# Patient Record
Sex: MALE | Race: OTHER | URBAN - METROPOLITAN AREA
[De-identification: names, ages, dates, MRNs, and addresses within clinical notes are randomized per-mention and may not be internally consistent; named-entity substitution may affect disease eponyms.]

---

## 2023-05-22 ENCOUNTER — HOSPITAL ENCOUNTER (INPATIENT)
Facility: HOSPITAL | Age: 38
LOS: 2 days | Discharge: ANOTHER HOSPITAL | DRG: 101 | End: 2023-05-24
Attending: EMERGENCY MEDICINE | Admitting: FAMILY MEDICINE
Payer: MEDICAID

## 2023-05-22 DIAGNOSIS — R41.82 ALTERED MENTAL STATUS: ICD-10-CM

## 2023-05-22 DIAGNOSIS — R79.89 TROPONIN LEVEL ELEVATED: ICD-10-CM

## 2023-05-22 DIAGNOSIS — A41.9 SEPSIS, DUE TO UNSPECIFIED ORGANISM, UNSPECIFIED WHETHER ACUTE ORGAN DYSFUNCTION PRESENT: Primary | ICD-10-CM

## 2023-05-22 DIAGNOSIS — M62.82 NON-TRAUMATIC RHABDOMYOLYSIS: ICD-10-CM

## 2023-05-22 DIAGNOSIS — N17.9 AKI (ACUTE KIDNEY INJURY): ICD-10-CM

## 2023-05-22 DIAGNOSIS — R56.9 SEIZURE: ICD-10-CM

## 2023-05-22 DIAGNOSIS — Z97.8 ENDOTRACHEALLY INTUBATED: ICD-10-CM

## 2023-05-22 DIAGNOSIS — G40.901 STATUS EPILEPTICUS: ICD-10-CM

## 2023-05-22 PROBLEM — I10 HTN (HYPERTENSION): Status: ACTIVE | Noted: 2023-05-22

## 2023-05-22 LAB
ALBUMIN SERPL BCP-MCNC: 4.9 G/DL (ref 3.5–5.2)
ALP SERPL-CCNC: 56 U/L (ref 55–135)
ALT SERPL W/O P-5'-P-CCNC: 23 U/L (ref 10–44)
AMPHET+METHAMPHET UR QL: NEGATIVE
ANION GAP SERPL CALC-SCNC: 12 MMOL/L (ref 8–16)
ANION GAP SERPL CALC-SCNC: 21 MMOL/L (ref 8–16)
AST SERPL-CCNC: 31 U/L (ref 10–40)
BACTERIA #/AREA URNS HPF: ABNORMAL /HPF
BARBITURATES UR QL SCN>200 NG/ML: NEGATIVE
BASOPHILS # BLD AUTO: 0.07 K/UL (ref 0–0.2)
BASOPHILS NFR BLD: 0.4 % (ref 0–1.9)
BENZODIAZ UR QL SCN>200 NG/ML: ABNORMAL
BILIRUB SERPL-MCNC: 1.1 MG/DL (ref 0.1–1)
BILIRUB UR QL STRIP: NEGATIVE
BUN SERPL-MCNC: 19 MG/DL (ref 6–20)
BUN SERPL-MCNC: 24 MG/DL (ref 6–20)
BZE UR QL SCN: NEGATIVE
CALCIUM SERPL-MCNC: 10.5 MG/DL (ref 8.7–10.5)
CALCIUM SERPL-MCNC: 8.9 MG/DL (ref 8.7–10.5)
CANNABINOIDS UR QL SCN: ABNORMAL
CHLORIDE SERPL-SCNC: 110 MMOL/L (ref 95–110)
CHLORIDE SERPL-SCNC: 111 MMOL/L (ref 95–110)
CK SERPL-CCNC: 6946 U/L (ref 20–200)
CK SERPL-CCNC: 749 U/L (ref 20–200)
CK SERPL-CCNC: ABNORMAL U/L (ref 20–200)
CLARITY CSF: CLEAR
CLARITY UR: ABNORMAL
CO2 SERPL-SCNC: 13 MMOL/L (ref 23–29)
CO2 SERPL-SCNC: 22 MMOL/L (ref 23–29)
COLOR CSF: COLORLESS
COLOR UR: YELLOW
CREAT SERPL-MCNC: 1.4 MG/DL (ref 0.5–1.4)
CREAT SERPL-MCNC: 2.3 MG/DL (ref 0.5–1.4)
CREAT UR-MCNC: 114.2 MG/DL (ref 23–375)
CSF TUBE NUMBER: 1
CSF TUBE NUMBER: 1
CTP QC/QA: YES
CTP QC/QA: YES
DIFFERENTIAL METHOD: ABNORMAL
EOSINOPHIL # BLD AUTO: 0.1 K/UL (ref 0–0.5)
EOSINOPHIL NFR BLD: 0.3 % (ref 0–8)
ERYTHROCYTE [DISTWIDTH] IN BLOOD BY AUTOMATED COUNT: 12.8 % (ref 11.5–14.5)
EST. GFR  (NO RACE VARIABLE): 36 ML/MIN/1.73 M^2
EST. GFR  (NO RACE VARIABLE): >60 ML/MIN/1.73 M^2
ETHANOL SERPL-MCNC: <10 MG/DL
GLUCOSE CSF-MCNC: 78 MG/DL (ref 40–70)
GLUCOSE SERPL-MCNC: 107 MG/DL (ref 70–110)
GLUCOSE SERPL-MCNC: 107 MG/DL (ref 70–110)
GLUCOSE UR QL STRIP: ABNORMAL
HCO3 UR-SCNC: 21.3 MMOL/L (ref 24–28)
HCT VFR BLD AUTO: 42.1 % (ref 40–54)
HGB BLD-MCNC: 13.6 G/DL (ref 14–18)
HGB UR QL STRIP: ABNORMAL
HYALINE CASTS #/AREA URNS LPF: 8 /LPF
IMM GRANULOCYTES # BLD AUTO: 0.16 K/UL (ref 0–0.04)
IMM GRANULOCYTES NFR BLD AUTO: 0.9 % (ref 0–0.5)
KETONES UR QL STRIP: NEGATIVE
LACTATE SERPL-SCNC: 2.4 MMOL/L (ref 0.5–2.2)
LACTATE SERPL-SCNC: 9.7 MMOL/L (ref 0.5–2.2)
LEUKOCYTE ESTERASE UR QL STRIP: NEGATIVE
LYMPHOCYTES # BLD AUTO: 1.8 K/UL (ref 1–4.8)
LYMPHOCYTES NFR BLD: 9.8 % (ref 18–48)
LYMPHOCYTES NFR CSF MANUAL: 82 % (ref 40–80)
MAGNESIUM SERPL-MCNC: 2.9 MG/DL (ref 1.6–2.6)
MCH RBC QN AUTO: 29.1 PG (ref 27–31)
MCHC RBC AUTO-ENTMCNC: 32.3 G/DL (ref 32–36)
MCV RBC AUTO: 90 FL (ref 82–98)
METHADONE UR QL SCN>300 NG/ML: NEGATIVE
MICROSCOPIC COMMENT: ABNORMAL
MONOCYTES # BLD AUTO: 0.9 K/UL (ref 0.3–1)
MONOCYTES NFR BLD: 4.9 % (ref 4–15)
MONOS+MACROS NFR CSF MANUAL: 3 % (ref 15–45)
NEUTROPHILS # BLD AUTO: 15.1 K/UL (ref 1.8–7.7)
NEUTROPHILS NFR BLD: 83.7 % (ref 38–73)
NEUTROPHILS NFR CSF MANUAL: 15 % (ref 0–6)
NITRITE UR QL STRIP: NEGATIVE
NRBC BLD-RTO: 0 /100 WBC
OPIATES UR QL SCN: NEGATIVE
PCO2 BLDA: 37.7 MMHG (ref 35–45)
PCP UR QL SCN>25 NG/ML: NEGATIVE
PH SMN: 7.36 [PH] (ref 7.35–7.45)
PH UR STRIP: 7 [PH] (ref 5–8)
PHOSPHATE SERPL-MCNC: 3.8 MG/DL (ref 2.7–4.5)
PLATELET # BLD AUTO: 294 K/UL (ref 150–450)
PMV BLD AUTO: 10.2 FL (ref 9.2–12.9)
PO2 BLDA: 92 MMHG (ref 80–100)
POC BE: -4 MMOL/L
POC MOLECULAR INFLUENZA A AGN: NEGATIVE
POC MOLECULAR INFLUENZA B AGN: NEGATIVE
POC SATURATED O2: 97 % (ref 95–100)
POC TCO2: 22 MMOL/L (ref 23–27)
POTASSIUM SERPL-SCNC: 4 MMOL/L (ref 3.5–5.1)
POTASSIUM SERPL-SCNC: 4.2 MMOL/L (ref 3.5–5.1)
PROCALCITONIN SERPL IA-MCNC: 0.05 NG/ML
PROT CSF-MCNC: 58 MG/DL (ref 15–40)
PROT SERPL-MCNC: 7.7 G/DL (ref 6–8.4)
PROT UR QL STRIP: ABNORMAL
RBC # BLD AUTO: 4.67 M/UL (ref 4.6–6.2)
RBC # CSF: 11 /CU MM
RBC #/AREA URNS HPF: >100 /HPF (ref 0–4)
SAMPLE: ABNORMAL
SARS-COV-2 RDRP RESP QL NAA+PROBE: NEGATIVE
SODIUM SERPL-SCNC: 144 MMOL/L (ref 136–145)
SODIUM SERPL-SCNC: 145 MMOL/L (ref 136–145)
SP GR UR STRIP: 1.01 (ref 1–1.03)
SPECIMEN VOL CSF: 1.1 ML
SQUAMOUS #/AREA URNS HPF: 1 /HPF
TOXICOLOGY INFORMATION: ABNORMAL
TROPONIN I SERPL DL<=0.01 NG/ML-MCNC: 0.14 NG/ML (ref 0–0.03)
TROPONIN I SERPL DL<=0.01 NG/ML-MCNC: 0.5 NG/ML (ref 0–0.03)
TROPONIN I SERPL DL<=0.01 NG/ML-MCNC: 0.58 NG/ML (ref 0–0.03)
TSH SERPL DL<=0.005 MIU/L-ACNC: 1.38 UIU/ML (ref 0.4–4)
URN SPEC COLLECT METH UR: ABNORMAL
UROBILINOGEN UR STRIP-ACNC: NEGATIVE EU/DL
WBC # BLD AUTO: 18.05 K/UL (ref 3.9–12.7)
WBC # CSF: 2 /CU MM (ref 0–5)
WBC #/AREA URNS HPF: 28 /HPF (ref 0–5)

## 2023-05-22 PROCEDURE — 80048 BASIC METABOLIC PNL TOTAL CA: CPT | Mod: XB

## 2023-05-22 PROCEDURE — 36600 WITHDRAWAL OF ARTERIAL BLOOD: CPT

## 2023-05-22 PROCEDURE — 25000003 PHARM REV CODE 250

## 2023-05-22 PROCEDURE — 83605 ASSAY OF LACTIC ACID: CPT | Mod: 91 | Performed by: EMERGENCY MEDICINE

## 2023-05-22 PROCEDURE — 87502 INFLUENZA DNA AMP PROBE: CPT

## 2023-05-22 PROCEDURE — 84157 ASSAY OF PROTEIN OTHER: CPT | Performed by: EMERGENCY MEDICINE

## 2023-05-22 PROCEDURE — 82550 ASSAY OF CK (CPK): CPT | Performed by: STUDENT IN AN ORGANIZED HEALTH CARE EDUCATION/TRAINING PROGRAM

## 2023-05-22 PROCEDURE — 84145 PROCALCITONIN (PCT): CPT | Performed by: EMERGENCY MEDICINE

## 2023-05-22 PROCEDURE — C9254 INJECTION, LACOSAMIDE: HCPCS | Performed by: STUDENT IN AN ORGANIZED HEALTH CARE EDUCATION/TRAINING PROGRAM

## 2023-05-22 PROCEDURE — 89051 BODY FLUID CELL COUNT: CPT | Performed by: EMERGENCY MEDICINE

## 2023-05-22 PROCEDURE — 62272 THER SPI PNXR DRG CSF: CPT

## 2023-05-22 PROCEDURE — 63600175 PHARM REV CODE 636 W HCPCS

## 2023-05-22 PROCEDURE — 84484 ASSAY OF TROPONIN QUANT: CPT | Mod: 91

## 2023-05-22 PROCEDURE — 25000003 PHARM REV CODE 250: Performed by: EMERGENCY MEDICINE

## 2023-05-22 PROCEDURE — 63600175 PHARM REV CODE 636 W HCPCS: Performed by: STUDENT IN AN ORGANIZED HEALTH CARE EDUCATION/TRAINING PROGRAM

## 2023-05-22 PROCEDURE — 82803 BLOOD GASES ANY COMBINATION: CPT

## 2023-05-22 PROCEDURE — 99291 CRITICAL CARE FIRST HOUR: CPT

## 2023-05-22 PROCEDURE — 99900035 HC TECH TIME PER 15 MIN (STAT)

## 2023-05-22 PROCEDURE — 84484 ASSAY OF TROPONIN QUANT: CPT | Mod: 91 | Performed by: EMERGENCY MEDICINE

## 2023-05-22 PROCEDURE — C9254 INJECTION, LACOSAMIDE: HCPCS | Performed by: SURGERY

## 2023-05-22 PROCEDURE — 96361 HYDRATE IV INFUSION ADD-ON: CPT

## 2023-05-22 PROCEDURE — 80235 DRUG ASSAY LACOSAMIDE: CPT | Performed by: STUDENT IN AN ORGANIZED HEALTH CARE EDUCATION/TRAINING PROGRAM

## 2023-05-22 PROCEDURE — 93010 EKG 12-LEAD: ICD-10-PCS | Mod: 76,,, | Performed by: INTERNAL MEDICINE

## 2023-05-22 PROCEDURE — 84484 ASSAY OF TROPONIN QUANT: CPT | Performed by: STUDENT IN AN ORGANIZED HEALTH CARE EDUCATION/TRAINING PROGRAM

## 2023-05-22 PROCEDURE — 95720 PR EEG, W/VIDEO, CONT RECORD, I&R, >12<26 HRS: ICD-10-PCS | Mod: ,,, | Performed by: STUDENT IN AN ORGANIZED HEALTH CARE EDUCATION/TRAINING PROGRAM

## 2023-05-22 PROCEDURE — 63600175 PHARM REV CODE 636 W HCPCS: Performed by: SURGERY

## 2023-05-22 PROCEDURE — 82550 ASSAY OF CK (CPK): CPT | Mod: 91

## 2023-05-22 PROCEDURE — 80307 DRUG TEST PRSMV CHEM ANLYZR: CPT | Performed by: EMERGENCY MEDICINE

## 2023-05-22 PROCEDURE — P9612 CATHETERIZE FOR URINE SPEC: HCPCS

## 2023-05-22 PROCEDURE — 93005 ELECTROCARDIOGRAM TRACING: CPT

## 2023-05-22 PROCEDURE — 83735 ASSAY OF MAGNESIUM: CPT | Performed by: EMERGENCY MEDICINE

## 2023-05-22 PROCEDURE — 25000003 PHARM REV CODE 250: Performed by: FAMILY MEDICINE

## 2023-05-22 PROCEDURE — 84443 ASSAY THYROID STIM HORMONE: CPT | Performed by: EMERGENCY MEDICINE

## 2023-05-22 PROCEDURE — 95720 EEG PHY/QHP EA INCR W/VEEG: CPT | Mod: ,,, | Performed by: STUDENT IN AN ORGANIZED HEALTH CARE EDUCATION/TRAINING PROGRAM

## 2023-05-22 PROCEDURE — 85025 COMPLETE CBC W/AUTO DIFF WBC: CPT | Performed by: EMERGENCY MEDICINE

## 2023-05-22 PROCEDURE — 82550 ASSAY OF CK (CPK): CPT | Mod: 91 | Performed by: EMERGENCY MEDICINE

## 2023-05-22 PROCEDURE — 99292 CRITICAL CARE ADDL 30 MIN: CPT

## 2023-05-22 PROCEDURE — 87040 BLOOD CULTURE FOR BACTERIA: CPT | Mod: 59 | Performed by: EMERGENCY MEDICINE

## 2023-05-22 PROCEDURE — 96360 HYDRATION IV INFUSION INIT: CPT | Mod: 59

## 2023-05-22 PROCEDURE — 81000 URINALYSIS NONAUTO W/SCOPE: CPT | Mod: 59 | Performed by: EMERGENCY MEDICINE

## 2023-05-22 PROCEDURE — 25000003 PHARM REV CODE 250: Performed by: SURGERY

## 2023-05-22 PROCEDURE — 20000000 HC ICU ROOM

## 2023-05-22 PROCEDURE — 80053 COMPREHEN METABOLIC PANEL: CPT | Performed by: EMERGENCY MEDICINE

## 2023-05-22 PROCEDURE — C9254 INJECTION, LACOSAMIDE: HCPCS

## 2023-05-22 PROCEDURE — 25000003 PHARM REV CODE 250: Performed by: STUDENT IN AN ORGANIZED HEALTH CARE EDUCATION/TRAINING PROGRAM

## 2023-05-22 PROCEDURE — 82077 ASSAY SPEC XCP UR&BREATH IA: CPT | Performed by: EMERGENCY MEDICINE

## 2023-05-22 PROCEDURE — 87205 SMEAR GRAM STAIN: CPT | Performed by: EMERGENCY MEDICINE

## 2023-05-22 PROCEDURE — 93010 ELECTROCARDIOGRAM REPORT: CPT | Mod: 76,,, | Performed by: INTERNAL MEDICINE

## 2023-05-22 PROCEDURE — 96365 THER/PROPH/DIAG IV INF INIT: CPT

## 2023-05-22 PROCEDURE — 93010 ELECTROCARDIOGRAM REPORT: CPT | Mod: ,,, | Performed by: INTERNAL MEDICINE

## 2023-05-22 PROCEDURE — 63600175 PHARM REV CODE 636 W HCPCS: Performed by: EMERGENCY MEDICINE

## 2023-05-22 PROCEDURE — 96375 TX/PRO/DX INJ NEW DRUG ADDON: CPT

## 2023-05-22 PROCEDURE — 87070 CULTURE OTHR SPECIMN AEROBIC: CPT | Performed by: EMERGENCY MEDICINE

## 2023-05-22 PROCEDURE — 36415 COLL VENOUS BLD VENIPUNCTURE: CPT

## 2023-05-22 PROCEDURE — 82945 GLUCOSE OTHER FLUID: CPT | Performed by: EMERGENCY MEDICINE

## 2023-05-22 PROCEDURE — 84100 ASSAY OF PHOSPHORUS: CPT | Performed by: EMERGENCY MEDICINE

## 2023-05-22 RX ORDER — LISINOPRIL 5 MG/1
5 TABLET ORAL DAILY
Status: ON HOLD | COMMUNITY
End: 2023-05-30 | Stop reason: HOSPADM

## 2023-05-22 RX ORDER — DEXTROSE 40 %
15 GEL (GRAM) ORAL
Status: DISCONTINUED | OUTPATIENT
Start: 2023-05-22 | End: 2023-05-24 | Stop reason: HOSPADM

## 2023-05-22 RX ORDER — LIDOCAINE HYDROCHLORIDE 10 MG/ML
INJECTION, SOLUTION EPIDURAL; INFILTRATION; INTRACAUDAL; PERINEURAL
Status: COMPLETED
Start: 2023-05-22 | End: 2023-05-22

## 2023-05-22 RX ORDER — LACOSAMIDE 50 MG/1
100 TABLET ORAL 2 TIMES DAILY
COMMUNITY

## 2023-05-22 RX ORDER — SODIUM CHLORIDE 9 MG/ML
INJECTION, SOLUTION INTRAVENOUS CONTINUOUS
Status: DISCONTINUED | OUTPATIENT
Start: 2023-05-22 | End: 2023-05-22

## 2023-05-22 RX ORDER — MUPIROCIN 20 MG/G
OINTMENT TOPICAL 2 TIMES DAILY
Status: DISCONTINUED | OUTPATIENT
Start: 2023-05-22 | End: 2023-05-24 | Stop reason: HOSPADM

## 2023-05-22 RX ORDER — ACETAMINOPHEN 325 MG/1
650 TABLET ORAL EVERY 4 HOURS PRN
Status: DISCONTINUED | OUTPATIENT
Start: 2023-05-22 | End: 2023-05-22

## 2023-05-22 RX ORDER — ONDANSETRON 2 MG/ML
8 INJECTION INTRAMUSCULAR; INTRAVENOUS EVERY 6 HOURS PRN
Status: DISCONTINUED | OUTPATIENT
Start: 2023-05-22 | End: 2023-05-24 | Stop reason: HOSPADM

## 2023-05-22 RX ORDER — ONDANSETRON 2 MG/ML
8 INJECTION INTRAMUSCULAR; INTRAVENOUS EVERY 8 HOURS PRN
Status: DISCONTINUED | OUTPATIENT
Start: 2023-05-22 | End: 2023-05-22

## 2023-05-22 RX ORDER — NALOXONE HCL 0.4 MG/ML
0.4 VIAL (ML) INJECTION
Status: DISCONTINUED | OUTPATIENT
Start: 2023-05-22 | End: 2023-05-22

## 2023-05-22 RX ORDER — SODIUM CHLORIDE, SODIUM LACTATE, POTASSIUM CHLORIDE, CALCIUM CHLORIDE 600; 310; 30; 20 MG/100ML; MG/100ML; MG/100ML; MG/100ML
INJECTION, SOLUTION INTRAVENOUS CONTINUOUS
Status: ACTIVE | OUTPATIENT
Start: 2023-05-22 | End: 2023-05-23

## 2023-05-22 RX ORDER — AMOXICILLIN 250 MG
1 CAPSULE ORAL 2 TIMES DAILY PRN
Status: DISCONTINUED | OUTPATIENT
Start: 2023-05-22 | End: 2023-05-24

## 2023-05-22 RX ORDER — SODIUM CHLORIDE 0.9 % (FLUSH) 0.9 %
5 SYRINGE (ML) INJECTION
Status: DISCONTINUED | OUTPATIENT
Start: 2023-05-22 | End: 2023-05-24 | Stop reason: HOSPADM

## 2023-05-22 RX ORDER — LORAZEPAM 2 MG/ML
2 INJECTION INTRAMUSCULAR
Status: ACTIVE | OUTPATIENT
Start: 2023-05-22 | End: 2023-05-22

## 2023-05-22 RX ORDER — NALOXONE HCL 0.4 MG/ML
0.02 VIAL (ML) INJECTION
Status: DISCONTINUED | OUTPATIENT
Start: 2023-05-22 | End: 2023-05-24 | Stop reason: HOSPADM

## 2023-05-22 RX ORDER — ACETAMINOPHEN 325 MG/1
650 TABLET ORAL EVERY 6 HOURS PRN
Status: DISCONTINUED | OUTPATIENT
Start: 2023-05-22 | End: 2023-05-24

## 2023-05-22 RX ORDER — SODIUM CHLORIDE, SODIUM LACTATE, POTASSIUM CHLORIDE, CALCIUM CHLORIDE 600; 310; 30; 20 MG/100ML; MG/100ML; MG/100ML; MG/100ML
INJECTION, SOLUTION INTRAVENOUS CONTINUOUS
Status: ACTIVE | OUTPATIENT
Start: 2023-05-22 | End: 2023-05-22

## 2023-05-22 RX ORDER — DEXTROSE 40 %
30 GEL (GRAM) ORAL
Status: DISCONTINUED | OUTPATIENT
Start: 2023-05-22 | End: 2023-05-24 | Stop reason: HOSPADM

## 2023-05-22 RX ORDER — GLUCAGON 1 MG
1 KIT INJECTION
Status: DISCONTINUED | OUTPATIENT
Start: 2023-05-22 | End: 2023-05-24 | Stop reason: HOSPADM

## 2023-05-22 RX ORDER — ACETAMINOPHEN 650 MG/1
650 SUPPOSITORY RECTAL
Status: COMPLETED | OUTPATIENT
Start: 2023-05-22 | End: 2023-05-22

## 2023-05-22 RX ORDER — SODIUM CHLORIDE 0.9 % (FLUSH) 0.9 %
3 SYRINGE (ML) INJECTION
Status: DISCONTINUED | OUTPATIENT
Start: 2023-05-22 | End: 2023-05-24 | Stop reason: HOSPADM

## 2023-05-22 RX ADMIN — SODIUM CHLORIDE, POTASSIUM CHLORIDE, SODIUM LACTATE AND CALCIUM CHLORIDE: 600; 310; 30; 20 INJECTION, SOLUTION INTRAVENOUS at 10:05

## 2023-05-22 RX ADMIN — ONDANSETRON HYDROCHLORIDE 8 MG: 2 SOLUTION INTRAMUSCULAR; INTRAVENOUS at 11:05

## 2023-05-22 RX ADMIN — MUPIROCIN: 20 OINTMENT TOPICAL at 10:05

## 2023-05-22 RX ADMIN — SODIUM CHLORIDE, POTASSIUM CHLORIDE, SODIUM LACTATE AND CALCIUM CHLORIDE 1448 ML: 600; 310; 30; 20 INJECTION, SOLUTION INTRAVENOUS at 09:05

## 2023-05-22 RX ADMIN — ACETAMINOPHEN 650 MG: 650 SUPPOSITORY RECTAL at 08:05

## 2023-05-22 RX ADMIN — LACOSAMIDE 200 MG: 10 INJECTION, SOLUTION INTRAVENOUS at 06:05

## 2023-05-22 RX ADMIN — LACOSAMIDE 50 MG: 10 INJECTION, SOLUTION INTRAVENOUS at 01:05

## 2023-05-22 RX ADMIN — SODIUM CHLORIDE, POTASSIUM CHLORIDE, SODIUM LACTATE AND CALCIUM CHLORIDE: 600; 310; 30; 20 INJECTION, SOLUTION INTRAVENOUS at 06:05

## 2023-05-22 RX ADMIN — LIDOCAINE HYDROCHLORIDE 50 MG: 10 INJECTION, SOLUTION EPIDURAL; INFILTRATION; INTRACAUDAL at 09:05

## 2023-05-22 RX ADMIN — LACOSAMIDE 200 MG: 10 INJECTION, SOLUTION INTRAVENOUS at 04:05

## 2023-05-22 RX ADMIN — SODIUM CHLORIDE: 0.9 INJECTION, SOLUTION INTRAVENOUS at 03:05

## 2023-05-22 RX ADMIN — VANCOMYCIN HYDROCHLORIDE 2000 MG: 5 INJECTION, POWDER, LYOPHILIZED, FOR SOLUTION INTRAVENOUS at 10:05

## 2023-05-22 RX ADMIN — ACETAMINOPHEN 650 MG: 325 TABLET ORAL at 11:05

## 2023-05-22 RX ADMIN — SODIUM CHLORIDE, POTASSIUM CHLORIDE, SODIUM LACTATE AND CALCIUM CHLORIDE 1000 ML: 600; 310; 30; 20 INJECTION, SOLUTION INTRAVENOUS at 08:05

## 2023-05-22 RX ADMIN — CEFTRIAXONE SODIUM 2 G: 2 INJECTION, POWDER, FOR SOLUTION INTRAMUSCULAR; INTRAVENOUS at 09:05

## 2023-05-22 RX ADMIN — SODIUM CHLORIDE 1000 ML: 0.9 INJECTION, SOLUTION INTRAVENOUS at 08:05

## 2023-05-22 RX ADMIN — SODIUM CHLORIDE: 0.9 INJECTION, SOLUTION INTRAVENOUS at 05:05

## 2023-05-22 NOTE — CONSULTS
"Pulm/CC Fellow Consult Note    Attending Physician: Andi Lou III, MD    Date of Admit: 2023    Reason for Consult: Altered Mental Status    History of Present Illness:  Trini Pathak is a 38 y.o.  male with a PMHx SDH/TBI s/p craniectomy 2020, Bipolar disorder, HTN, and known history of seizure since his SDH presented to the ER at [facility] on  as an unknown person s/p two seizures. He had a witness generalized seizure by EMS and was given 10mg IM versed in the field prior to arrival. On arrival to the ER, the patient was minimally responsive with labs that supported a recent seizure like episode. Because of his continued lack of arousal, he was admitted to the ICU for close monitoring.     Record review indicates that this is JOSE Acosta 1985, with multiple records noted in the [facility] system. He was last seen by [facility] neurology on 4/3/2023 where he had Keppra switched to VimPat secondary to side effects. He is also noted to have a recent [facility] admission 3/2/23 for seizures after he ran out of his Keppra.    Record review indicates that he was recently a social drinker, less than half a pack per day smoker, and daily marijuana user.     Past Medical History:  Past Medical History:   Diagnosis Date    Hypertension     Seizures        Past Surgical History:  History reviewed. No pertinent surgical history.    Allergies:  Review of patient's allergies indicates:  No Known Allergies    Family History:  History reviewed. No pertinent family history.    Social History:       Review of Systems:  Review of Systems   Unable to perform ROS: Patient unresponsive      Objective:   Last 24 Hour Vital Signs:  BP  Min: 116/72  Max: 166/85  Temp  Av.6 °F (37 °C)  Min: 97.5 °F (36.4 °C)  Max: 100.8 °F (38.2 °C)  Pulse  Av.7  Min: 65  Max: 141  Resp  Av.9  Min: 16  Max: 41  SpO2  Av.6 %  Min: 80 %  Max: 100 %  Height  Av' 0.5" (184.2 cm)  Min: 6' (182.9 cm)  Max: 6' 1" " (185.4 cm)  Weight  Av.1 kg (165 lb 8.1 oz)  Min: 68.5 kg (151 lb 0.2 oz)  Max: 81.6 kg (180 lb)  Body mass index is 20.48 kg/m².  No intake/output data recorded.    Physical Exam:  General: Arousable only to noxious stimuli, does note appear to be in distress  HENT:  NC; R scar through hairline from previous craniotomy noted;  anicteric sclera; no nystagmus noted, sluggish pupillary response, equal bilaterally  Cardio:  Regular rate and rhythm with normal S1 and S2; no murmurs or rubs  Resp:  CTAB; respirations unlabored; no wheezes, crackles or rhonchi  Abdom:  Soft, Non-tender  Extrem:  WWP with no clubbing, cyanosis or edema  Pulses:  2+ and symmetric distally  Neuro:  Minimally arousable in the ED; AO x 3 after transfer up to the ICU; cooperative and pleasant; no focal deficits noted       Assessment & Plan:   37yo M w/ PMH of SDH/craniectomy and subsequent seizure disorder who presents with recurrent seizures.     Neuro  #TBI/SDH s/p craniectomy 2020  #Epilepsy  Patient had recurrent seizures x 2 in the outpatient setting, given 10mg IV versed with no recurrence and improvement of his mentation in the post-ictal setting. He was recently changed to Vimpat by [facility] neurology last month, vimpat levels pending. Unclear if this recurrence was therapeutic failure, subtherapeutic levels, medication non-compliance, or other precipitating factors. As he becomes more arousable, we can get the details of his medication usage. It appears that for now, he is improving and nearly back to his baseline. Currently undergoing a spot EEG.  - spot EEG now  - continue Vimpat BID  - follow up neurology recommendations  - follow up vimpat levels  - lactic acid resolving    CVS  - pt remains HD stable without need for intervention    Pulm  #Tobacco use  - will discuss tobacco and marijuana use when patient is more awake    GI  - no issues noted    Renal  #Metabolic acidosis  - likely 2/2 to lactic acidosis which is  resolving    #ALONDRA  #Rhabdomyolysis  Baseline Cr around 0.9. Now with new ALONDRA, increasing CPK likely secondary to his generalized seizure. Will recommend aggressive IVF rehydration and target higher urine outputs until the CPK is down-trending and less than 5000.   - IVFs to target -300cc/hr  - repeat CPK, BMP q6 until improvement  - avoid volume overload     Heme  #Normocytic anemia  - Baseline Hb around 12    ID  #Leukocytosis   - Likely secondary to seizure disorder  - No evidence of infection at this time    Endocrine  - No known issues    Disposition: After the spot EEG read, and pending continued mental status improvement, the patient is likely a candidate to step down to the floor this evening or in the morning.    Thank you for this consultation. Please contact me at [phone] for any further assistance.     Justin Ellerman, MD  Fellow  [facility]

## 2023-05-22 NOTE — CONSULTS
"NEUROLOGY FLOOR CONSULT    Reason for consult:  seizure    CC:  "seizure"    HPI:   Trini Pathak is a 38 y.o. w/ Hx of TBI status and SDH status post evacuation, epilepsy, bipolar disorder, HTN who presented to the ED after a witnessed seizure by friend at home, was given 10 mg IM Versed en route by EMS. In the ED, pt was obtunded and responded only to noxious stimuli, so pt was admitted to the ICU. It is unknown whether pt is compliant with his home Vimpat 50 mg BID.        ROS: As per HPI    Histories:     Allergies:  Patient has no known allergies.    Current Medications:    Current Facility-Administered Medications   Medication Dose Route Frequency Provider Last Rate Last Admin    0.9%  NaCl infusion   Intravenous Continuous Justin Ellerman, MD        acetaminophen tablet 650 mg  650 mg Oral Q6H PRN Brady Rubin DO        [START ON 5/23/2023] cefTRIAXone (ROCEPHIN) 2 g in dextrose 5 % in water (D5W) 5 % 50 mL IVPB (MB+)  2 g Intravenous Q24H Arabella Becerra MD        dextrose 10% bolus 125 mL 125 mL  12.5 g Intravenous PRN Arabella Becrera MD        dextrose 10% bolus 250 mL 250 mL  25 g Intravenous PRN Arabella Becerra MD        dextrose 40 % gel 15,000 mg  15 g Oral PRN Arabella Becerra MD        dextrose 40 % gel 30,000 mg  30 g Oral PRN Arabella Becerra MD        glucagon (human recombinant) injection 1 mg  1 mg Intramuscular PRN Arabella Becerra MD        lacosamide (VIMPAT) 50 mg in sodium chloride 0.9% 100 mL IVPB  50 mg Intravenous Q12H Brady Rubin  mL/hr at 05/22/23 1416 Rate Verify at 05/22/23 1416    LORazepam injection 2 mg  2 mg Intravenous Q15 Min PRN Arabella Becerra MD        naloxone 0.4 mg/mL injection 0.02 mg  0.02 mg Intravenous PRN Arabella Becerra MD        ondansetron injection 8 mg  8 mg Intravenous Q6H PRN Brady Rubin DO        senna-docusate 8.6-50 mg per tablet 1 tablet  1 tablet Oral BID PRN Arabella Becerra MD        sodium chloride 0.9% flush 3 mL  3 mL Intravenous PRN " Arabella Becerra MD        sodium chloride 0.9% flush 5 mL  5 mL Intravenous PRN Arabella Becerra MD        vancomycin - pharmacy to dose   Intravenous pharmacy to manage frequency Arabella Becerra MD           Past Medical/Surgical/Family History:  Medical:   Past Medical History:   Diagnosis Date    Hypertension     Seizures       Surgeries: History reviewed. No pertinent surgical history.   Family: History reviewed. No pertinent family history.,     Social History:    Substance Abuse/Dependence History:  Tobacco: unknown tobacco use  EtOH:  unknown  Ilicits: unknown    Occupational/Employment History:  Occupation: unknown occupation      Current Evaluation:     Vital Signs:   Vitals:    05/22/23 1252   BP: (!) 158/95   Pulse: 84   Resp: (!) 24   Temp: 97.5 °F (36.4 °C)        Neurological Examination   Orientation  Somnolent but arouses easily,   GCS [id number]  Memory  Unable to assess  Language  No dysarthria, No aphasia, paucity of speech   Cranial Nerves  PERRL, VF intact, EOMI, V1-V3 intact, symmetric facial expression, hearing grossly intact, SCM & TPZ 5/5, tongue midline, symmetric palate elevation.  Motor  Normal Bulk  Spasticity in LUE  Follows commands in all 4 extremities  Sensory  Normal to light touch throughout  DTR   +3 in LUE, 2+ elsewhere  Cerebellar/Gait  Normal FNF    LABORATORY STUDIES:  Component Ref Range & Units 09:11   Benzodiazepines Negative Presumptive Positive Abnormal     Methadone metabolites Negative Negative    Cocaine (Metab.) Negative Negative    Opiate Scrn, Ur Negative Negative    Barbiturate Screen, Ur Negative Negative    Amphetamine Screen, Ur Negative Negative    THC Negative Presumptive Positive Abnormal     Phencyclidine Negative Negative    Creatinine, Urine 23.0 - 375.0 mg/dL 114.2    0 Result Notes        Component Ref Range & Units 12:41 08:05   CPK 20 - 200 U/L 6946 High   749 High         Component Ref Range & Units 10:08   Heme Aliquot mL 1.1    Appearance, CSF Clear Clear     Color, CSF Colorless Colorless    WBC, CSF 0 - 5 /cu mm 2    RBC, CSF 0 /cu mm 11 Abnormal  VC    Comment: CORRECTED RESULT; previously reported as 0 on 05/22/2023 at 11:02.   Segmented Neutrophils, CSF 0 - 6 % 15 High     Lymphs, CSF 40 - 80 % 82 High     Mono/Macrophage, CSF 15 - 45 % 3 Low       CSF Tube Number  1    Protein, CSF 15 - 40 mg/dL 58 High       Units 09:11    Specimen UA  Urine, Catheterized    Color, UA Yellow, Straw, Bri Yellow    Appearance, UA Clear Hazy Abnormal     pH, UA 5.0 - 8.0 7.0    Specific Gravity, UA 1.005 - 1.030 1.015    Protein, UA Negative 3+ Abnormal     Comment: Recommend a 24 hour urine protein or a urine   protein/creatinine ratio if globulin induced proteinuria is   clinically suspected.    Glucose, UA Negative Trace Abnormal     Ketones, UA Negative Negative    Bilirubin (UA) Negative Negative    Occult Blood UA Negative 3+ Abnormal     Nitrite, UA Negative Negative    Urobilinogen, UA <2.0 EU/dL Negative    Leukocytes, UA Negative Negative     Component Ref Range & Units 08:05   Sodium 136 - 145 mmol/L 145    Potassium 3.5 - 5.1 mmol/L 4.2    Chloride 95 - 110 mmol/L 111 High     CO2 23 - 29 mmol/L 13 Low     Glucose 70 - 110 mg/dL 107    BUN 6 - 20 mg/dL 24 High     Creatinine 0.5 - 1.4 mg/dL 2.3 High     Calcium 8.7 - 10.5 mg/dL 10.5    Total Protein 6.0 - 8.4 g/dL 7.7    Albumin 3.5 - 5.2 g/dL 4.9    Total Bilirubin 0.1 - 1.0 mg/dL 1.1 High     Comment: For infants and newborns, interpretation of results should be based   on gestational age, weight and in agreement with clinical   observations.     Premature Infant recommended reference ranges:   Up to 24 hours.............<8.0 mg/dL   Up to 48 hours............<12.0 mg/dL   3-5 days..................<15.0 mg/dL   6-29 days.................<15.0 mg/dL    Alkaline Phosphatase 55 - 135 U/L 56    AST 10 - 40 U/L 31    ALT 10 - 44 U/L 23    Anion Gap 8 - 16 mmol/L 21 High     eGFR >60 mL/min/1.73 m^2 36 Abnormal        RADIOLOGY STUDIES:  I have personally reviewed the images performed.     HEAD CT:  FINDINGS:  Blood: No acute intracranial hemorrhage.     Parenchyma: No definite loss of gray-white differentiation to suggest acute or subacute transcortical infarct. Extensive right hemispheric encephalomalacia gliosis is noted involving the right frontal, temporal, parietal lobes deep to remote right-sided craniectomy/cranioplasty sequela.  Ex vacuo dilatation of the right lateral ventricle noted.  Suspect overall age advanced parenchymal volume loss elsewhere.     Ventricles/Extra-axial spaces: As above.  Basal cisterns appear patent.     Vessels: No significant calcifications.     Orbits: Unremarkable.     Scalp: Unremarkable.     Skull: As above.  Some degree of resorption of the skull flap is suggested.     Sinuses and mastoids: Essentially clear.     Other findings: None     Impression:     1. No definite acute intracranial findings.  2. Extensive right hemispheric encephalomalacia and gliosis and remote possible operative sequela, as discussed.             Assessment:  RAFA Pathak is a 38 y.o. w/ Hx of TBI with resulting epilepsy, bipolar disorder who presents after a witnessed seizure with no obvious provoking factor identified. .     -Vimpat levels pending to assess compliance  -Increase Vimpat to 100 mg BID  -Follow up with neurology outpatient           Case discussed with Dr. Veras    Will sign off at this time. Please call with further questions/concern.    Luis Winkler MD  [facility], PGY-III

## 2023-05-22 NOTE — PLAN OF CARE
Discussed focal status epilepticus findings with neurology team. Recommend a 400mg vimpat load now followed by 200mg q12. Plan for reassessment in the morning and addition of a second agent if no improvement.    Justin Ellerman, MD  Fellow  [facility]

## 2023-05-22 NOTE — PROGRESS NOTES
Pharmacokinetic Initial Assessment: IV Vancomycin    Assessment/Plan:    Initiate intravenous vancomycin with loading dose of 2000 mg once followed by a maintenance dose of vancomycin 1500 mg IV every 24 hours  Desired empiric serum trough concentration is 15 to 20 mcg/mL  Draw vancomycin trough level 60 min prior to third dose on 5/24 at approximately 1000  Pharmacy will continue to follow and monitor vancomycin.      Please contact pharmacy at extension [phone] with any questions regarding this assessment.     Thank you for the consult,   Alda Spears       Patient brief summary:  Trini Pathak is a 38 y.o. male initiated on antimicrobial therapy with IV Vancomycin for treatment of suspected sepsis    Drug Allergies:   Review of patient's allergies indicates:  No Known Allergies    Actual Body Weight:   81.6 kg     Renal Function:   Estimated Creatinine Clearance: 49.2 mL/min (A) (based on SCr of 2.3 mg/dL (H)).,     CBC (last 72 hours):  Recent Labs   Lab Result Units 05/22/23  0805   WBC K/uL 18.05*   Hemoglobin g/dL 13.6*   Hematocrit % 42.1   Platelets K/uL 294   Gran % % 83.7*   Lymph % % 9.8*   Mono % % 4.9   Eosinophil % % 0.3   Basophil % % 0.4   Differential Method  Automated       Metabolic Panel (last 72 hours):  Recent Labs   Lab Result Units 05/22/23  0805 05/22/23  0911 05/22/23  1008   Sodium mmol/L 145  --   --    Potassium mmol/L 4.2  --   --    Chloride mmol/L 111*  --   --    CO2 mmol/L 13*  --   --    Glucose mg/dL 107  --   --    Glucose, CSF mg/dL  --   --  78*   Glucose, UA   --  Trace*  --    BUN mg/dL 24*  --   --    Creatinine mg/dL 2.3*  --   --    Creatinine, Urine mg/dL  --  114.2  --    Albumin g/dL 4.9  --   --    Total Bilirubin mg/dL 1.1*  --   --    Alkaline Phosphatase U/L 56  --   --    AST U/L 31  --   --    ALT U/L 23  --   --    Magnesium mg/dL 2.9*  --   --    Phosphorus mg/dL 3.8  --   --        Drug levels (last 3 results):  No results for input(s):  VANCOMYCINRA, VANCORANDOM, VANCOMYCINPE, VANCOPEAK, VANCOMYCINTR, VANCOTROUGH in the last 72 hours.    Microbiologic Results:  Microbiology Results (last 7 days)       Procedure Component Value Units Date/Time    CSF culture and Gram Stain (Tube 2) [[id number]] Collected: 05/22/23 1054    Order Status: Sent Specimen: CSF (Spinal Fluid) from CSF Tap, Tube 2 Updated: 05/22/23 1102    Blood Culture #2 **CANNOT BE ORDERED STAT** [[id number]] Collected: 05/22/23 0813    Order Status: Sent Specimen: Blood from Peripheral, Forearm, Left Updated: 05/22/23 1004    Blood Culture #1 **CANNOT BE ORDERED STAT** [[id number]] Collected: 05/22/23 0810    Order Status: Sent Specimen: Blood from Peripheral, Hand, Right Updated: 05/22/23 1004

## 2023-05-22 NOTE — ED PROVIDER NOTES
Encounter Date: 5/22/2023       History     Chief Complaint   Patient presents with    Seizures     Pt presents to ED today via [facility] witnessed seizure  Friend at home unaware of pt's info or medical hx   Pt received versed 10 mg IM en route      Patient is a 38-year-old male brought in by EMS after he had a witnessed seizure this morning.  A friend that was supposedly with the patient offered no information as to the patient's name or medical history.  He was given 10 mg of Versed intramuscularly by EMS personnel on the way to the hospital.  He presents obtunded, minimally responsive to painful stimuli.  No further history is currently obtainable.    Review of patient's allergies indicates:  No Known Allergies  Past Medical History:   Diagnosis Date    Hypertension     Seizures      History reviewed. No pertinent surgical history.  History reviewed. No pertinent family history.     Review of Systems   Unable to perform ROS: Patient nonverbal     Physical Exam     Initial Vitals   BP Pulse Resp Temp SpO2   05/22/23 0755 05/22/23 0750 05/22/23 0750 05/22/23 0747 05/22/23 0754   116/72 (!) 139 (!) 40 (!) 100.8 °F (38.2 °C) (!) 92 %      MAP       --                Physical Exam    Nursing note and vitals reviewed.  Constitutional:   Unresponsive.   HENT:   Head: Atraumatic.   Eyes:   Pupils 1 millimeter bilaterally.   Cardiovascular:  Normal rate, regular rhythm and normal heart sounds.           Pulmonary/Chest: No respiratory distress.   Abdominal: Abdomen is soft. He exhibits no distension.   Musculoskeletal:         General: No edema.     Neurological:   Unresponsive.   Skin: Skin is warm and dry.       ED Course   Lumbar Puncture    Date/Time: 5/22/2023 10:10 AM  Location procedure was performed: [facility] EMERGENCY DEPARTMENT  Performed by: Tru Quinn MD  Authorized by: Tru Quinn MD   Consent Done: Emergent Situation  Indications: evaluation for infection and evaluation for altered mental  status  Anesthesia: local infiltration    Anesthesia:  Local Anesthetic: lidocaine 1% without epinephrine  Anesthetic total: 10 mL    Patient sedated: no  Preparation: Patient was prepped and draped in the usual sterile fashion.  Lumbar space: L3-L4 interspace  Patient's position: left lateral decubitus  Needle gauge: 22  Needle type: karolyn point  Needle length: 3.5 in  Number of attempts: 3  Fluid appearance: clear  Tubes of fluid: 4  Total volume: 4 ml  Post-procedure: site cleaned  Complications: No  Patient tolerance: Patient tolerated the procedure well with no immediate complications      Critical Care    Date/Time: 5/22/2023 2:15 PM  Performed by: Tru Quinn MD  Authorized by: Andi Lou III, MD   Direct patient critical care time: 90 minutes  Ordering / reviewing critical care time: 15 minutes  Documentation critical care time: 15 minutes  Consulting other physicians critical care time: 5 minutes  Total critical care time (exclusive of procedural time) : 125 minutes  Critical care was time spent personally by me on the following activities: examination of patient, obtaining history from patient or surrogate, ordering and performing treatments and interventions, ordering and review of laboratory studies, ordering and review of radiographic studies, pulse oximetry, re-evaluation of patient's condition and discussions with primary provider.      Labs Reviewed   CBC W/ AUTO DIFFERENTIAL - Abnormal; Notable for the following components:       Result Value    WBC 18.05 (*)     Hemoglobin 13.6 (*)     Immature Granulocytes 0.9 (*)     Gran # (ANC) 15.1 (*)     Immature Grans (Abs) 0.16 (*)     Gran % 83.7 (*)     Lymph % 9.8 (*)     All other components within normal limits   COMPREHENSIVE METABOLIC PANEL - Abnormal; Notable for the following components:    Chloride 111 (*)     CO2 13 (*)     BUN 24 (*)     Creatinine 2.3 (*)     Total Bilirubin 1.1 (*)     Anion Gap 21 (*)     eGFR 36 (*)     All  other components within normal limits   CK - Abnormal; Notable for the following components:     (*)     All other components within normal limits   TROPONIN I - Abnormal; Notable for the following components:    Troponin I 0.141 (*)     All other components within normal limits   MAGNESIUM - Abnormal; Notable for the following components:    Magnesium 2.9 (*)     All other components within normal limits   URINALYSIS - Abnormal; Notable for the following components:    Appearance, UA Hazy (*)     Protein, UA 3+ (*)     Glucose, UA Trace (*)     Occult Blood UA 3+ (*)     All other components within normal limits    Narrative:     Specimen Source->Urine   DRUG SCREEN PANEL, URINE EMERGENCY - Abnormal; Notable for the following components:    Benzodiazepines Presumptive Positive (*)     THC Presumptive Positive (*)     All other components within normal limits    Narrative:     Specimen Source->Urine   LACTIC ACID, PLASMA - Abnormal; Notable for the following components:    Lactate (Lactic Acid) 9.7 (*)     All other components within normal limits    Narrative:      LACT  critical result(s) called and verbal readback obtained from KAI Molina RN by TONE 05/22/2023 08:39   URINALYSIS MICROSCOPIC - Abnormal; Notable for the following components:    RBC, UA >100 (*)     WBC, UA 28 (*)     Bacteria Few (*)     Hyaline Casts, UA 8 (*)     All other components within normal limits    Narrative:     Specimen Source->Urine   GLUCOSE, CSF - Abnormal; Notable for the following components:    Glucose, CSF 78 (*)     All other components within normal limits   PROTEIN, CSF - Abnormal; Notable for the following components:    Protein, CSF 58 (*)     All other components within normal limits   CSF CELL COUNT WITH DIFFERENTIAL - Abnormal; Notable for the following components:    RBC, CSF 11 (*)     Segmented Neutrophils, CSF 15 (*)     Lymphs, CSF 82 (*)     Mono/Macrophage, CSF 3 (*)     All other components within normal  limits   ISTAT PROCEDURE - Abnormal; Notable for the following components:    POC HCO3 21.3 (*)     POC TCO2 22 (*)     All other components within normal limits   CULTURE, BLOOD   CULTURE, BLOOD   TSH   PHOSPHORUS   ALCOHOL,MEDICAL (ETHANOL)   PROCALCITONIN   PROCALCITONIN   FREEZE AND HOLD -    LACOSAMIDE (VIMPAT)   SARS-COV-2 RDRP GENE   POCT INFLUENZA A/B MOLECULAR     EKG Readings: (Independently Interpreted)   Initial Reading: No STEMI. Rhythm: Sinus Tachycardia. Heart Rate: 125. ST Segments: Normal ST Segments.   ECG Results              EKG 12-lead (In process)  Result time 05/22/23 12:59:09      In process by Interface, Lab In [facility] (05/22/23 12:59:09)                   Narrative:    Test Reason : R41.82,    Vent. Rate : 125 BPM     Atrial Rate : 125 BPM     P-R Int : 168 ms          QRS Dur : 078 ms      QT Int : 308 ms       P-R-T Axes : 085 100 -14 degrees     QTc Int : 444 ms    Sinus tachycardia  Right atrial enlargement  Rightward axis  Cannot rule out Anterior infarct ,age undetermined  T wave abnormality, consider inferior ischemia  Abnormal ECG  No previous ECGs available    Referred By: System System           Confirmed By:                                   Imaging Results              CT Head Without Contrast (Final result)  Result time 05/22/23 09:16:52      Final result by Rivera Lowery MD (05/22/23 09:16:52)                   Impression:      1. No definite acute intracranial findings.  2. Extensive right hemispheric encephalomalacia and gliosis and remote possible operative sequela, as discussed.      Electronically signed by: Rivera Lowery  Date:    05/22/2023  Time:    09:16               Narrative:    EXAMINATION:  CT HEAD WITHOUT CONTRAST    CLINICAL HISTORY:  Mental status change, unknown cause;    TECHNIQUE:  Low dose axial images were obtained through the head.  Coronal and sagittal reformations were also performed. Contrast was not  administered.    COMPARISON:  None.    FINDINGS:  Blood: No acute intracranial hemorrhage.    Parenchyma: No definite loss of gray-white differentiation to suggest acute or subacute transcortical infarct. Extensive right hemispheric encephalomalacia gliosis is noted involving the right frontal, temporal, parietal lobes deep to remote right-sided craniectomy/cranioplasty sequela.  Ex vacuo dilatation of the right lateral ventricle noted.  Suspect overall age advanced parenchymal volume loss elsewhere.    Ventricles/Extra-axial spaces: As above.  Basal cisterns appear patent.    Vessels: No significant calcifications.    Orbits: Unremarkable.    Scalp: Unremarkable.    Skull: As above.  Some degree of resorption of the skull flap is suggested.    Sinuses and mastoids: Essentially clear.    Other findings: None                                       X-Ray Chest 1 View (Final result)  Result time 05/22/23 09:08:37      Final result by Sheela Noonan MD (05/22/23 09:08:37)                   Impression:      No acute intrathoracic process seen.      Electronically signed by: Sheela Noonan MD  Date:    05/22/2023  Time:    09:08               Narrative:    EXAMINATION:  XR CHEST 1 VIEW    CLINICAL HISTORY:  Altered mental status;    TECHNIQUE:  Single frontal view of the chest was performed.    COMPARISON:  None    FINDINGS:  The cardiac silhouette is midline, normal in size.  The pulmonary vascularity is normal.    The lungs are clear.    No pleural effusion.  No pneumothorax.    The osseous structures appear normal.                                       Medications   sodium chloride 0.9% flush 3 mL (has no administration in time range)   sodium chloride 0.9% flush 5 mL (has no administration in time range)   senna-docusate 8.6-50 mg per tablet 1 tablet (has no administration in time range)   naloxone 0.4 mg/mL injection 0.02 mg (has no administration in time range)   glucagon (human recombinant) injection 1  mg (has no administration in time range)   dextrose 10% bolus 125 mL 125 mL (has no administration in time range)   dextrose 10% bolus 250 mL 250 mL (has no administration in time range)   dextrose 40 % gel 15,000 mg (has no administration in time range)   dextrose 40 % gel 30,000 mg (has no administration in time range)   acetaminophen tablet 650 mg (has no administration in time range)   ondansetron injection 8 mg (has no administration in time range)   lacosamide (VIMPAT) 50 mg in sodium chloride 0.9% 100 mL IVPB ( Intravenous Verify Only 5/22/23 1416)   cefTRIAXone (ROCEPHIN) 2 g in dextrose 5 % in water (D5W) 5 % 50 mL IVPB (MB+) (has no administration in time range)   vancomycin - pharmacy to dose (has no administration in time range)   LORazepam injection 2 mg (has no administration in time range)   acetaminophen suppository 650 mg (650 mg Rectal Given 5/22/23 0843)   sodium chloride 0.9% bolus 1,000 mL 1,000 mL (0 mLs Intravenous Stopped 5/22/23 0914)   cefTRIAXone (ROCEPHIN) 2 g in dextrose 5 % in water (D5W) 5 % 50 mL IVPB (MB+) (0 g Intravenous Stopped 5/22/23 1012)   vancomycin 2 g in dextrose 5 % 500 mL IVPB (0 mg Intravenous Stopped 5/22/23 1251)   LIDOcaine (PF) 10 mg/ml (1%) 10 mg/mL (1 %) injection (50 mg  Given 5/22/23 0950)     Medical Decision Making:   Initial Assessment:   38-year-old male brought in by EMS after having a seizure earlier today.  Reportedly the person who witnessed the seizure gave no information on this patient.  He presents with an altered mental status, nonverbal.  Differential Diagnosis:   Postictal state, drug overdose, ETOH intoxication, CVA, intracranial hemorrhage, subarachnoid hemorrhage, electrolyte abnormality, sepsis.  Clinical Tests:   Lab Tests: Ordered and Reviewed       <> Summary of Lab: CBC shows a white blood cell count of 18.05.  CMP with a creatinine of 2.3.  Lactic acid is 9.7.  ED Management:  Extensive workup has been performed including a lumbar  puncture.  Patient has become slightly more responsive while in the ED. I am currently uncertain as to the etiology of his altered mental status.  This has been discussed with the [facility] resident who will admit. CBC shows a white blood cell count of 18 K.                        Clinical Impression:   Final diagnoses:  [R41.82] Altered mental status  [R56.9] Seizure  [A41.9] Sepsis, due to unspecified organism, unspecified whether acute organ dysfunction present (Primary)        ED Disposition Condition    Admit                 Tru Quinn MD  05/22/23 7957

## 2023-05-22 NOTE — H&P
[facility]  History & Physical    Patient Name: Trini Pathak  MRN: [id number]  Patient Class: IP- Inpatient  Admission Date: 5/22/2023  Attending Physician: Andi Lou III, MD   Primary Care Provider: No primary care provider on file.         Patient information was obtained from ER records.     Subjective:     Principal Problem:Seizure    Chief Complaint:   Chief Complaint   Patient presents with    Seizures     Pt presents to ED today via [facility] witnessed seizure  Friend at home unaware of pt's info or medical hx   Pt received versed 10 mg IM en route         HPI: 39 yo unidentified M presented to [facility] ED after witnessed seizure. Was naked on presentation and incontinent of urine. Pt could not be identifed on presentation, was brought in by friend who also did not provide any information. Was given 10 mg Versed IM via EMS. Was obtunded and minimally responsive to painful stimuli. Unable to obtain further hx.    When seen in ED, pt was still obtunded with sluggish pupils. Responded to noxious stimuli. Only moving Right side of body, unclear if that is is his baseline. GCS 11 - E3, V2, M6. Following verbal commands.    In ED, initial vitals were /72, , RR 40, temp 100.8 F (38.2) and SpO2 92%. Labs: WBC 18, glucose 107. Troponin 0.141, lactate 9.7, , procal negative. TSH 1.382. UA negative nitrite, >100 RBC, 25 WBC. Flu and covid negative. Blood cultures in process. EKG: No STEMI, sinus tachycardia. CTH: no acute findings. Extensive Right hemispheric encephalomalacia. Pt admitted to [facility] for seizures and AMS.      Past Medical History:   Diagnosis Date    Hypertension     Seizures        History reviewed. No pertinent surgical history.    Review of patient's allergies indicates:  No Known Allergies    No current facility-administered medications on file prior to encounter.     Current Outpatient Medications on File Prior to Encounter    Medication Sig    lacosamide (VIMPAT) 50 mg Tab Take 100 mg by mouth 2 (two) times a day.    lisinopriL (PRINIVIL,ZESTRIL) 5 MG tablet Take 5 mg by mouth once daily.     Family History    None       Tobacco Use    Smoking status: Not on file    Smokeless tobacco: Not on file   Substance and Sexual Activity    Alcohol use: Not on file    Drug use: Not on file    Sexual activity: Not on file     Review of Systems   Unable to perform ROS: Mental status change    Nonverbal    Objective:     Vital Signs (Most Recent):  Temp: 97.6 °F (36.4 °C) (05/22/23 0915)  Pulse: 73 (05/22/23 1154)  Resp: (!) 24 (05/22/23 1154)  BP: (!) 164/83 (05/22/23 1132)  SpO2: 100 % (05/22/23 1154) Vital Signs (24h Range):  Temp:  [97.6 °F (36.4 °C)-100.8 °F (38.2 °C)] 97.6 °F (36.4 °C)  Pulse:  [] 73  Resp:  [16-41] 24  SpO2:  [91 %-100 %] 100 %  BP: (116-164)/(72-84) 164/83     Weight: 81.6 kg (180 lb)  Body mass index is 23.75 kg/m².     Physical Exam  Constitutional:       Comments: Nonverbal, altered mental status   HENT:      Head: Normocephalic.      Comments: Previous craniectomy/cranioplasty      Right Ear: External ear normal.      Left Ear: External ear normal.   Eyes:      Comments: Sluggish pupils   Cardiovascular:      Rate and Rhythm: Normal rate.      Pulses: Normal pulses.      Heart sounds: Normal heart sounds.   Pulmonary:      Effort: Pulmonary effort is normal.      Breath sounds: Normal breath sounds. No wheezing.   Neurological:      Mental Status: He is lethargic, disoriented and confused.      GCS: GCS eye subscore is 3. GCS verbal subscore is 2. GCS motor subscore is 6.      Comments: Following verbal commands, responds to painful stimuli  Moving RUE and RLE predominantly              Significant Labs: All pertinent labs within the past 24 hours have been reviewed.    CBC:   Recent Labs   Lab 05/22/23  0805   WBC 18.05*   HGB 13.6*   HCT 42.1        CMP:   Recent Labs   Lab 05/22/23  0805      K 4.2  "  *   CO2 13*      BUN 24*   CREATININE 2.3*   CALCIUM 10.5   PROT 7.7   ALBUMIN 4.9   BILITOT 1.1*   ALKPHOS 56   AST 31   ALT 23   ANIONGAP 21*       Lactic Acid:   Recent Labs   Lab 05/22/23  0805   LACTATE 9.7*     Magnesium:   Recent Labs   Lab 05/22/23  0805   MG 2.9*     Pathology Results  (Last 10 years)      None          Troponin:   Recent Labs   Lab 05/22/23  0805   TROPONINI 0.141*     TSH:   Recent Labs   Lab 05/22/23  0805   TSH 1.382     Urine Studies:   Recent Labs   Lab 05/22/23  0911   COLORU Yellow   APPEARANCEUA Hazy*   PHUR 7.0   SPECGRAV 1.015   PROTEINUA 3+*   GLUCUA Trace*   KETONESU Negative   BILIRUBINUA Negative   OCCULTUA 3+*   NITRITE Negative   UROBILINOGEN Negative   LEUKOCYTESUR Negative   RBCUA >100*   WBCUA 28*   BACTERIA Few*   SQUAMEPITHEL 1   HYALINECASTS 8*       Significant Imaging: I have reviewed all pertinent imaging results/findings within the past 24 hours.    Assessment/Plan:     37 yo M here with seizures and AMS. Showing improvement in alertness and mentation, likely post-ictal state. Will be monitored in ICU for now.      Neuro  * Seizure  Witnessed seizure, Urinary incontinence on presentation  S/p 10 mg Versed IM via EMS  , lactate 9.7    Plan  - Continue home dose 50 mg Vimpat  - Remain strictly NPO for now  - Neuro consulted, appreciate recs  - Neuro checks  - Lorazepam PRN  - Seizure precautions  - Monitor in ICU for now  - EEG  - Trend CK and lactate as they were elevated on admission      Altered mental status  See "Seizure"    Plan  - ABG  - Trending lactate  - Continue Vanc and Rocephin for 24 hours, while r/o infectious causes  - LP completed, follow up results    Cardiac/Vascular  Troponin level elevated  Trop 0.141    Plan  - Trend troponin q6h until downtrending    HTN (hypertension)  Holding home lisinopril 5 mg while strictly NPO    Renal/  ALONDRA (acute kidney injury)  Unknown baseline renal function  Initial BUN/Cr 24/2.3 on " admission    Plan  - Maintain UOP at 0.5 mL/kg/hr  - Hold nephrotoxic medications  - Renally-dose current meds if available  - Bladder scan if suspicion of retention, followed by retroperitoneal US to evaluate for Hydronephrosis. Garcia if retention.   - Avoid Hypotension  - Strict I/O's  - Monitor renal function           Arabella Becerra MD, MPH  [facility] PGY-1  05/22/2023

## 2023-05-22 NOTE — SUBJECTIVE & OBJECTIVE
Past Medical History:   Diagnosis Date    Hypertension     Seizures        History reviewed. No pertinent surgical history.    Review of patient's allergies indicates:  No Known Allergies    No current facility-administered medications on file prior to encounter.     Current Outpatient Medications on File Prior to Encounter   Medication Sig    lacosamide (VIMPAT) 50 mg Tab Take 100 mg by mouth 2 (two) times a day.    lisinopriL (PRINIVIL,ZESTRIL) 5 MG tablet Take 5 mg by mouth once daily.     Family History    None       Tobacco Use    Smoking status: Not on file    Smokeless tobacco: Not on file   Substance and Sexual Activity    Alcohol use: Not on file    Drug use: Not on file    Sexual activity: Not on file     Review of Systems   Unable to perform ROS: Mental status change    Nonverbal    Objective:     Vital Signs (Most Recent):  Temp: 97.6 °F (36.4 °C) (05/22/23 0915)  Pulse: 73 (05/22/23 1154)  Resp: (!) 24 (05/22/23 1154)  BP: (!) 164/83 (05/22/23 1132)  SpO2: 100 % (05/22/23 1154) Vital Signs (24h Range):  Temp:  [97.6 °F (36.4 °C)-100.8 °F (38.2 °C)] 97.6 °F (36.4 °C)  Pulse:  [] 73  Resp:  [16-41] 24  SpO2:  [91 %-100 %] 100 %  BP: (116-164)/(72-84) 164/83     Weight: 81.6 kg (180 lb)  Body mass index is 23.75 kg/m².     Physical Exam  Constitutional:       Comments: Nonverbal, altered mental status   HENT:      Head: Normocephalic.      Comments: Scalp likely hematoma     Right Ear: External ear normal.      Left Ear: External ear normal.   Eyes:      Comments: Sluggish pupils   Cardiovascular:      Rate and Rhythm: Normal rate.      Pulses: Normal pulses.      Heart sounds: Normal heart sounds.   Pulmonary:      Effort: Pulmonary effort is normal.      Breath sounds: Normal breath sounds. No wheezing.   Neurological:      Mental Status: He is lethargic, disoriented and confused.      GCS: GCS eye subscore is 3. GCS verbal subscore is 2. GCS motor subscore is 6.      Comments: Following verbal  commands, responds to painful stimuli  Moving RUE and RLE predominantly              Significant Labs: All pertinent labs within the past 24 hours have been reviewed.    CBC:   Recent Labs   Lab 05/22/23  0805   WBC 18.05*   HGB 13.6*   HCT 42.1        CMP:   Recent Labs   Lab 05/22/23  0805      K 4.2   *   CO2 13*      BUN 24*   CREATININE 2.3*   CALCIUM 10.5   PROT 7.7   ALBUMIN 4.9   BILITOT 1.1*   ALKPHOS 56   AST 31   ALT 23   ANIONGAP 21*       Lactic Acid:   Recent Labs   Lab 05/22/23  0805   LACTATE 9.7*     Magnesium:   Recent Labs   Lab 05/22/23  0805   MG 2.9*     Pathology Results  (Last 10 years)      None          Troponin:   Recent Labs   Lab 05/22/23  0805   TROPONINI 0.141*     TSH:   Recent Labs   Lab 05/22/23  0805   TSH 1.382     Urine Studies:   Recent Labs   Lab 05/22/23  0911   COLORU Yellow   APPEARANCEUA Hazy*   PHUR 7.0   SPECGRAV 1.015   PROTEINUA 3+*   GLUCUA Trace*   KETONESU Negative   BILIRUBINUA Negative   OCCULTUA 3+*   NITRITE Negative   UROBILINOGEN Negative   LEUKOCYTESUR Negative   RBCUA >100*   WBCUA 28*   BACTERIA Few*   SQUAMEPITHEL 1   HYALINECASTS 8*       Significant Imaging: I have reviewed all pertinent imaging results/findings within the past 24 hours.

## 2023-05-22 NOTE — HPI
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 Was naked on presentation and incontinent of urine. Pt could not be identifed on presentation, was brought in by friend who also did not provide any information. Was given 10 mg Versed IM via EMS. Was obtunded and minimally responsive to painful stimuli.    Pt later was identified with medical records at [facility]. Last seen by [facility] in April 2023 and was switched to Vimpat from Keppra. Had previous [facility] hospitalization for seizures 2/2 to running out of Keppra.     When seen in ED, pt was still obtunded with sluggish pupils. Responded to noxious stimuli. Only moving Right side of body, unclear if that is is his baseline. GCS 11 - E3, V2, M6. Following verbal commands.    In ED, initial vitals were /72, , RR 40, temp 100.8 F (38.2) and SpO2 92%. Labs: WBC 18, glucose 107. Troponin 0.141, lactate 9.7, , procal negative. TSH 1.382. UA negative nitrite, >100 RBC, 28 WBC. Flu and covid negative. Blood cultures in process. EKG: No STEMI, sinus tachycardia. CTH: no acute findings. Extensive Right hemispheric encephalomalacia. Pt admitted to [facility] for seizures and AMS, requiring ICU level care.

## 2023-05-22 NOTE — ASSESSMENT & PLAN NOTE
Unknown baseline renal function  Initial BUN/Cr 24/2.3 on admission    Plan  - Maintain UOP at 0.5 mL/kg/hr  - Hold nephrotoxic medications  - Renally-dose current meds if available  - Bladder scan if suspicion of retention, followed by retroperitoneal US to evaluate for Hydronephrosis. Garcia if retention.   - Avoid Hypotension  - Strict I/O's  - Monitor renal function

## 2023-05-22 NOTE — PROCEDURES
Preliminary EEG Read    Background:   Continuous high amplitude discharges (2-2.5 hz) over the right hemisphere, with overriding fast activity and at times evolution concerning for focal status epilepticus.   The left hemisphere is not involved with seizure activity and consists of delta slowing, at times with over-riding theta activity.            Impression:   Abnormal study consistent with focal electrographic ictal/interictal epileptiform activity in the right hemisphere as well as a severe diffuse encephalopathy.  Findings were conveyed to primary team.      Please hit the EEG button with any clinical activity concerning for seizures and describe what you see.    Detailed report to follow.     Brenda Christian MD  [facility]   Department of Neurology

## 2023-05-22 NOTE — ED NOTES
LP preformed,   Non rebreather removed with pt maintain sats at 100% on room air, pt positioned flat, s/p LP, pt opens eyes to name being called, at times,

## 2023-05-22 NOTE — NURSING
Pt arrived from ED on RA and on vanc. Pt is awake and able to give us his Full name (Sebastian bradley) and . Pt unable to move LUE and LLE.     With patient's permission, he allowed staff to unlock his phone and call his mother (Sachi). Mother was able to confirm pt's name and , along w/ pt's medical hx. Team to call mother and give updates.

## 2023-05-22 NOTE — ED NOTES
Pt returned from CT scan, in and out cath preformed to obtain urine sample, pt sightly more responsive, remains on cardiac monitoring, cont pulse, and auto b/p

## 2023-05-22 NOTE — ED NOTES
Pt urinated via condom cath, while urinating , pt opened eyes and gave thumbs up when asked to, pt observed moving right upper and right lower leg, admit team at bedside, after urination pt then falls back asleep   Admit team states to hold narcan at this time

## 2023-05-22 NOTE — ASSESSMENT & PLAN NOTE
Witnessed seizure, Urinary incontinence on presentation  S/p 10 mg Versed IM via EMS  , lactate 9.7    Plan  - Continue home dose 50 mg Vimpat  - Remain strictly NPO for now  - Neuro consulted, appreciate recs  - Neuro checks  - Lorazepam PRN  - Seizure precautions  - Monitor in ICU for now  - EEG  - Trend CK and lactate as they were elevated on admission

## 2023-05-22 NOTE — ED NOTES
Resp at bedside for ABG, pt eyes open and able to state name, unable to states birthday, pt requested water, small sip of water provided, pt tolerated well

## 2023-05-22 NOTE — ED TRIAGE NOTES
Pt arrives via EMS, from a home address, unable;e to verify name and , at current time, EMS reports seizure activity and received 10 mg versed en route. Pt on non re breather and responsive to pain at this time only. Pt arrived with cell phone and rx bottle for vimpat, and lisinopril.   Pt incontinent of urine, and arrived naked, pt cleaned, placed in hospital gown and condom cath placed to obtain urine sample

## 2023-05-22 NOTE — ASSESSMENT & PLAN NOTE
"See "Seizure"    Plan  - ABG  - Trending lactate  - Continue Vanc and Rocephin for 24 hours, while r/o infectious causes  - LP completed, follow up results  "

## 2023-05-23 LAB
ALBUMIN SERPL BCP-MCNC: 4.3 G/DL (ref 3.5–5.2)
ALP SERPL-CCNC: 54 U/L (ref 55–135)
ALT SERPL W/O P-5'-P-CCNC: 88 U/L (ref 10–44)
ANION GAP SERPL CALC-SCNC: 10 MMOL/L (ref 8–16)
AST SERPL-CCNC: 343 U/L (ref 10–40)
BASOPHILS NFR BLD: 1 % (ref 0–1.9)
BILIRUB SERPL-MCNC: 1.7 MG/DL (ref 0.1–1)
BUN SERPL-MCNC: 11 MG/DL (ref 6–20)
CALCIUM SERPL-MCNC: 9.5 MG/DL (ref 8.7–10.5)
CHLORIDE SERPL-SCNC: 104 MMOL/L (ref 95–110)
CK SERPL-CCNC: ABNORMAL U/L (ref 20–200)
CK SERPL-CCNC: ABNORMAL U/L (ref 20–200)
CO2 SERPL-SCNC: 25 MMOL/L (ref 23–29)
CREAT SERPL-MCNC: 1.2 MG/DL (ref 0.5–1.4)
DIFFERENTIAL METHOD: ABNORMAL
EOSINOPHIL NFR BLD: 8 % (ref 0–8)
ERYTHROCYTE [DISTWIDTH] IN BLOOD BY AUTOMATED COUNT: 12.7 % (ref 11.5–14.5)
EST. GFR  (NO RACE VARIABLE): >60 ML/MIN/1.73 M^2
GLUCOSE SERPL-MCNC: 112 MG/DL (ref 70–110)
HCT VFR BLD AUTO: 39.2 % (ref 40–54)
HGB BLD-MCNC: 13.2 G/DL (ref 14–18)
IMM GRANULOCYTES # BLD AUTO: ABNORMAL K/UL (ref 0–0.04)
IMM GRANULOCYTES NFR BLD AUTO: ABNORMAL % (ref 0–0.5)
LYMPHOCYTES NFR BLD: 15 % (ref 18–48)
MAGNESIUM SERPL-MCNC: 1.7 MG/DL (ref 1.6–2.6)
MCH RBC QN AUTO: 29.7 PG (ref 27–31)
MCHC RBC AUTO-ENTMCNC: 33.7 G/DL (ref 32–36)
MCV RBC AUTO: 88 FL (ref 82–98)
MONOCYTES NFR BLD: 5 % (ref 4–15)
NEUTROPHILS NFR BLD: 71 % (ref 38–73)
NRBC BLD-RTO: 0 /100 WBC
PHOSPHATE SERPL-MCNC: 2.7 MG/DL (ref 2.7–4.5)
PLATELET # BLD AUTO: 227 K/UL (ref 150–450)
PLATELET BLD QL SMEAR: ABNORMAL
PMV BLD AUTO: 10.1 FL (ref 9.2–12.9)
POTASSIUM SERPL-SCNC: 3.3 MMOL/L (ref 3.5–5.1)
PROT SERPL-MCNC: 7.2 G/DL (ref 6–8.4)
RBC # BLD AUTO: 4.45 M/UL (ref 4.6–6.2)
SODIUM SERPL-SCNC: 139 MMOL/L (ref 136–145)
VANCOMYCIN SERPL-MCNC: 6.9 UG/ML
WBC # BLD AUTO: 20.64 K/UL (ref 3.9–12.7)

## 2023-05-23 PROCEDURE — 63600175 PHARM REV CODE 636 W HCPCS

## 2023-05-23 PROCEDURE — 85007 BL SMEAR W/DIFF WBC COUNT: CPT

## 2023-05-23 PROCEDURE — 36569 INSJ PICC 5 YR+ W/O IMAGING: CPT

## 2023-05-23 PROCEDURE — 63600175 PHARM REV CODE 636 W HCPCS: Performed by: SURGERY

## 2023-05-23 PROCEDURE — 25000003 PHARM REV CODE 250

## 2023-05-23 PROCEDURE — 84100 ASSAY OF PHOSPHORUS: CPT

## 2023-05-23 PROCEDURE — 27000221 HC OXYGEN, UP TO 24 HOURS

## 2023-05-23 PROCEDURE — 85027 COMPLETE CBC AUTOMATED: CPT

## 2023-05-23 PROCEDURE — 63600175 PHARM REV CODE 636 W HCPCS: Performed by: STUDENT IN AN ORGANIZED HEALTH CARE EDUCATION/TRAINING PROGRAM

## 2023-05-23 PROCEDURE — 80202 ASSAY OF VANCOMYCIN: CPT | Performed by: FAMILY MEDICINE

## 2023-05-23 PROCEDURE — C1751 CATH, INF, PER/CENT/MIDLINE: HCPCS

## 2023-05-23 PROCEDURE — 20000000 HC ICU ROOM

## 2023-05-23 PROCEDURE — 99900035 HC TECH TIME PER 15 MIN (STAT)

## 2023-05-23 PROCEDURE — 25000003 PHARM REV CODE 250: Performed by: FAMILY MEDICINE

## 2023-05-23 PROCEDURE — 82550 ASSAY OF CK (CPK): CPT

## 2023-05-23 PROCEDURE — 80053 COMPREHEN METABOLIC PANEL: CPT

## 2023-05-23 PROCEDURE — 83735 ASSAY OF MAGNESIUM: CPT

## 2023-05-23 PROCEDURE — 25000003 PHARM REV CODE 250: Performed by: STUDENT IN AN ORGANIZED HEALTH CARE EDUCATION/TRAINING PROGRAM

## 2023-05-23 PROCEDURE — 82550 ASSAY OF CK (CPK): CPT | Mod: 91 | Performed by: SURGERY

## 2023-05-23 PROCEDURE — 94761 N-INVAS EAR/PLS OXIMETRY MLT: CPT

## 2023-05-23 PROCEDURE — 94002 VENT MGMT INPAT INIT DAY: CPT

## 2023-05-23 PROCEDURE — A4216 STERILE WATER/SALINE, 10 ML: HCPCS | Performed by: FAMILY MEDICINE

## 2023-05-23 PROCEDURE — 25000003 PHARM REV CODE 250: Performed by: SURGERY

## 2023-05-23 PROCEDURE — C9254 INJECTION, LACOSAMIDE: HCPCS | Performed by: SURGERY

## 2023-05-23 PROCEDURE — 63600175 PHARM REV CODE 636 W HCPCS: Performed by: FAMILY MEDICINE

## 2023-05-23 PROCEDURE — 27200966 HC CLOSED SUCTION SYSTEM

## 2023-05-23 RX ORDER — SODIUM CHLORIDE 0.9 % (FLUSH) 0.9 %
10 SYRINGE (ML) INJECTION
Status: DISCONTINUED | OUTPATIENT
Start: 2023-05-23 | End: 2023-05-24 | Stop reason: HOSPADM

## 2023-05-23 RX ORDER — ROCURONIUM BROMIDE 10 MG/ML
1 INJECTION, SOLUTION INTRAVENOUS ONCE
Status: COMPLETED | OUTPATIENT
Start: 2023-05-23 | End: 2023-05-23

## 2023-05-23 RX ORDER — MIDAZOLAM HYDROCHLORIDE 5 MG/ML
10 INJECTION INTRAMUSCULAR; INTRAVENOUS ONCE
Status: DISCONTINUED | OUTPATIENT
Start: 2023-05-23 | End: 2023-05-23

## 2023-05-23 RX ORDER — ROCURONIUM BROMIDE 10 MG/ML
INJECTION, SOLUTION INTRAVENOUS
Status: COMPLETED
Start: 2023-05-23 | End: 2023-05-23

## 2023-05-23 RX ORDER — MIDAZOLAM HYDROCHLORIDE 1 MG/ML
10 INJECTION INTRAMUSCULAR; INTRAVENOUS ONCE
Status: COMPLETED | OUTPATIENT
Start: 2023-05-23 | End: 2023-05-23

## 2023-05-23 RX ORDER — ETOMIDATE 2 MG/ML
INJECTION INTRAVENOUS
Status: COMPLETED
Start: 2023-05-23 | End: 2023-05-23

## 2023-05-23 RX ORDER — SODIUM CHLORIDE 0.9 % (FLUSH) 0.9 %
10 SYRINGE (ML) INJECTION EVERY 6 HOURS
Status: DISCONTINUED | OUTPATIENT
Start: 2023-05-23 | End: 2023-05-24 | Stop reason: HOSPADM

## 2023-05-23 RX ORDER — SUCCINYLCHOLINE CHLORIDE 20 MG/ML
INJECTION INTRAMUSCULAR; INTRAVENOUS
Status: DISPENSED
Start: 2023-05-23 | End: 2023-05-24

## 2023-05-23 RX ORDER — ETOMIDATE 2 MG/ML
0.3 INJECTION INTRAVENOUS ONCE
Status: COMPLETED | OUTPATIENT
Start: 2023-05-23 | End: 2023-05-23

## 2023-05-23 RX ORDER — ETOMIDATE 2 MG/ML
20 INJECTION INTRAVENOUS ONCE
Status: COMPLETED | OUTPATIENT
Start: 2023-05-23 | End: 2023-05-23

## 2023-05-23 RX ORDER — FENTANYL CITRATE 50 UG/ML
50 INJECTION, SOLUTION INTRAMUSCULAR; INTRAVENOUS
Status: DISCONTINUED | OUTPATIENT
Start: 2023-05-23 | End: 2023-05-24 | Stop reason: HOSPADM

## 2023-05-23 RX ORDER — PROPOFOL 10 MG/ML
0-50 INJECTION, EMULSION INTRAVENOUS CONTINUOUS
Status: DISCONTINUED | OUTPATIENT
Start: 2023-05-23 | End: 2023-05-24 | Stop reason: HOSPADM

## 2023-05-23 RX ORDER — FENTANYL CITRATE 50 UG/ML
50 INJECTION, SOLUTION INTRAMUSCULAR; INTRAVENOUS
Status: DISPENSED | OUTPATIENT
Start: 2023-05-23 | End: 2023-05-23

## 2023-05-23 RX ORDER — POTASSIUM CHLORIDE 7.45 MG/ML
10 INJECTION INTRAVENOUS
Status: COMPLETED | OUTPATIENT
Start: 2023-05-23 | End: 2023-05-23

## 2023-05-23 RX ORDER — PROPOFOL 10 MG/ML
INJECTION, EMULSION INTRAVENOUS
Status: DISPENSED
Start: 2023-05-23 | End: 2023-05-24

## 2023-05-23 RX ORDER — SODIUM CHLORIDE, SODIUM LACTATE, POTASSIUM CHLORIDE, CALCIUM CHLORIDE 600; 310; 30; 20 MG/100ML; MG/100ML; MG/100ML; MG/100ML
INJECTION, SOLUTION INTRAVENOUS CONTINUOUS
Status: ACTIVE | OUTPATIENT
Start: 2023-05-23 | End: 2023-05-24

## 2023-05-23 RX ORDER — LEVETIRACETAM 500 MG/5ML
2000 INJECTION, SOLUTION, CONCENTRATE INTRAVENOUS EVERY 12 HOURS
Status: DISCONTINUED | OUTPATIENT
Start: 2023-05-23 | End: 2023-05-24 | Stop reason: HOSPADM

## 2023-05-23 RX ORDER — FENTANYL CITRATE-0.9 % NACL/PF 10 MCG/ML
0-200 PLASTIC BAG, INJECTION (ML) INTRAVENOUS CONTINUOUS
Status: DISCONTINUED | OUTPATIENT
Start: 2023-05-23 | End: 2023-05-24

## 2023-05-23 RX ORDER — PROPOFOL 10 MG/ML
50 VIAL (ML) INTRAVENOUS ONCE
Status: COMPLETED | OUTPATIENT
Start: 2023-05-23 | End: 2023-05-23

## 2023-05-23 RX ORDER — MIDAZOLAM HYDROCHLORIDE 1 MG/ML
INJECTION INTRAMUSCULAR; INTRAVENOUS
Status: COMPLETED
Start: 2023-05-23 | End: 2023-05-23

## 2023-05-23 RX ORDER — LORAZEPAM 2 MG/ML
2 INJECTION INTRAMUSCULAR EVERY 10 MIN PRN
Status: DISCONTINUED | OUTPATIENT
Start: 2023-05-23 | End: 2023-05-24 | Stop reason: HOSPADM

## 2023-05-23 RX ORDER — SODIUM CHLORIDE 9 MG/ML
INJECTION, SOLUTION INTRAVENOUS
Status: DISCONTINUED | OUTPATIENT
Start: 2023-05-23 | End: 2023-05-24 | Stop reason: HOSPADM

## 2023-05-23 RX ORDER — ENOXAPARIN SODIUM 100 MG/ML
40 INJECTION SUBCUTANEOUS EVERY 24 HOURS
Status: DISCONTINUED | OUTPATIENT
Start: 2023-05-23 | End: 2023-05-24 | Stop reason: HOSPADM

## 2023-05-23 RX ORDER — SODIUM CHLORIDE, SODIUM LACTATE, POTASSIUM CHLORIDE, CALCIUM CHLORIDE 600; 310; 30; 20 MG/100ML; MG/100ML; MG/100ML; MG/100ML
INJECTION, SOLUTION INTRAVENOUS CONTINUOUS
Status: DISCONTINUED | OUTPATIENT
Start: 2023-05-23 | End: 2023-05-23

## 2023-05-23 RX ORDER — ROCURONIUM BROMIDE 10 MG/ML
70 INJECTION, SOLUTION INTRAVENOUS ONCE
Status: COMPLETED | OUTPATIENT
Start: 2023-05-23 | End: 2023-05-23

## 2023-05-23 RX ORDER — PROPOFOL 10 MG/ML
INJECTION, EMULSION INTRAVENOUS
Status: COMPLETED
Start: 2023-05-23 | End: 2023-05-23

## 2023-05-23 RX ORDER — SUCCINYLCHOLINE CHLORIDE 20 MG/ML
INJECTION INTRAMUSCULAR; INTRAVENOUS
Status: DISCONTINUED
Start: 2023-05-23 | End: 2023-05-23 | Stop reason: WASHOUT

## 2023-05-23 RX ADMIN — Medication 50 MCG/HR: at 09:05

## 2023-05-23 RX ADMIN — SODIUM CHLORIDE, PRESERVATIVE FREE 10 ML: 5 INJECTION INTRAVENOUS at 11:05

## 2023-05-23 RX ADMIN — PROPOFOL 50 MCG/KG/MIN: 10 INJECTION, EMULSION INTRAVENOUS at 06:05

## 2023-05-23 RX ADMIN — LORAZEPAM 2 MG: 2 INJECTION INTRAMUSCULAR; INTRAVENOUS at 04:05

## 2023-05-23 RX ADMIN — POTASSIUM CHLORIDE 10 MEQ: 7.46 INJECTION, SOLUTION INTRAVENOUS at 08:05

## 2023-05-23 RX ADMIN — PROPOFOL 50 MG: 10 INJECTION, EMULSION INTRAVENOUS at 03:05

## 2023-05-23 RX ADMIN — SODIUM CHLORIDE: 9 INJECTION, SOLUTION INTRAVENOUS at 07:05

## 2023-05-23 RX ADMIN — MIDAZOLAM HYDROCHLORIDE: 1 INJECTION, SOLUTION INTRAMUSCULAR; INTRAVENOUS at 02:05

## 2023-05-23 RX ADMIN — POTASSIUM CHLORIDE 10 MEQ: 7.46 INJECTION, SOLUTION INTRAVENOUS at 07:05

## 2023-05-23 RX ADMIN — SODIUM CHLORIDE, POTASSIUM CHLORIDE, SODIUM LACTATE AND CALCIUM CHLORIDE: 600; 310; 30; 20 INJECTION, SOLUTION INTRAVENOUS at 07:05

## 2023-05-23 RX ADMIN — PROPOFOL 10 MCG/KG/MIN: 10 INJECTION, EMULSION INTRAVENOUS at 01:05

## 2023-05-23 RX ADMIN — ETOMIDATE 20.6 MG: 2 INJECTION, SOLUTION INTRAVENOUS at 12:05

## 2023-05-23 RX ADMIN — MIDAZOLAM 10 MG: 1 INJECTION INTRAMUSCULAR; INTRAVENOUS at 02:05

## 2023-05-23 RX ADMIN — ROCURONIUM BROMIDE 69 MG: 10 INJECTION INTRAVENOUS at 12:05

## 2023-05-23 RX ADMIN — VANCOMYCIN HYDROCHLORIDE 1000 MG: 1 INJECTION, POWDER, LYOPHILIZED, FOR SOLUTION INTRAVENOUS at 08:05

## 2023-05-23 RX ADMIN — PROPOFOL 50 MG: 10 INJECTION, EMULSION INTRAVENOUS at 12:05

## 2023-05-23 RX ADMIN — SODIUM CHLORIDE, PRESERVATIVE FREE 10 ML: 5 INJECTION INTRAVENOUS at 06:05

## 2023-05-23 RX ADMIN — MUPIROCIN: 20 OINTMENT TOPICAL at 08:05

## 2023-05-23 RX ADMIN — POTASSIUM CHLORIDE 10 MEQ: 7.46 INJECTION, SOLUTION INTRAVENOUS at 11:05

## 2023-05-23 RX ADMIN — MIDAZOLAM HYDROCHLORIDE 5 MG/HR: 5 INJECTION, SOLUTION INTRAMUSCULAR; INTRAVENOUS at 04:05

## 2023-05-23 RX ADMIN — LACOSAMIDE 200 MG: 10 INJECTION, SOLUTION INTRAVENOUS at 05:05

## 2023-05-23 RX ADMIN — LORAZEPAM 2 MG: 2 INJECTION INTRAMUSCULAR; INTRAVENOUS at 10:05

## 2023-05-23 RX ADMIN — SODIUM CHLORIDE, POTASSIUM CHLORIDE, SODIUM LACTATE AND CALCIUM CHLORIDE: 600; 310; 30; 20 INJECTION, SOLUTION INTRAVENOUS at 02:05

## 2023-05-23 RX ADMIN — ETOMIDATE 20 MG: 2 INJECTION INTRAVENOUS at 03:05

## 2023-05-23 RX ADMIN — ETOMIDATE 20 MG: 2 INJECTION, SOLUTION INTRAVENOUS at 03:05

## 2023-05-23 RX ADMIN — PROPOFOL 50 MCG/KG/MIN: 10 INJECTION, EMULSION INTRAVENOUS at 11:05

## 2023-05-23 RX ADMIN — SODIUM CHLORIDE, POTASSIUM CHLORIDE, SODIUM LACTATE AND CALCIUM CHLORIDE: 600; 310; 30; 20 INJECTION, SOLUTION INTRAVENOUS at 06:05

## 2023-05-23 RX ADMIN — ROCURONIUM BROMIDE 70 MG: 10 INJECTION INTRAVENOUS at 03:05

## 2023-05-23 RX ADMIN — ROCURONIUM BROMIDE 70 MG: 10 INJECTION, SOLUTION INTRAVENOUS at 03:05

## 2023-05-23 RX ADMIN — LEVETIRACETAM 3000 MG: 100 INJECTION, SOLUTION INTRAVENOUS at 10:05

## 2023-05-23 RX ADMIN — LEVETIRACETAM 2000 MG: 100 INJECTION, SOLUTION INTRAVENOUS at 08:05

## 2023-05-23 RX ADMIN — MIDAZOLAM 0.5 MG/HR: 5 INJECTION INTRAMUSCULAR; INTRAVENOUS at 12:05

## 2023-05-23 RX ADMIN — SODIUM CHLORIDE, POTASSIUM CHLORIDE, SODIUM LACTATE AND CALCIUM CHLORIDE: 600; 310; 30; 20 INJECTION, SOLUTION INTRAVENOUS at 10:05

## 2023-05-23 RX ADMIN — FENTANYL CITRATE 50 MCG: 50 INJECTION, SOLUTION INTRAMUSCULAR; INTRAVENOUS at 01:05

## 2023-05-23 RX ADMIN — MUPIROCIN: 20 OINTMENT TOPICAL at 09:05

## 2023-05-23 RX ADMIN — POTASSIUM CHLORIDE 10 MEQ: 7.46 INJECTION, SOLUTION INTRAVENOUS at 09:05

## 2023-05-23 RX ADMIN — Medication 50 MG: at 03:05

## 2023-05-23 RX ADMIN — LACOSAMIDE 200 MG: 10 INJECTION, SOLUTION INTRAVENOUS at 07:05

## 2023-05-23 RX ADMIN — SODIUM CHLORIDE, POTASSIUM CHLORIDE, SODIUM LACTATE AND CALCIUM CHLORIDE: 600; 310; 30; 20 INJECTION, SOLUTION INTRAVENOUS at 04:05

## 2023-05-23 RX ADMIN — ENOXAPARIN SODIUM 40 MG: 40 INJECTION SUBCUTANEOUS at 05:05

## 2023-05-23 RX ADMIN — ETOMIDATE 20.6 MG: 2 INJECTION INTRAVENOUS at 12:05

## 2023-05-23 NOTE — NURSING
1230- Pt intubated and becoming restless on versed @ 1mg/hr. Propofol started @10mcg/hr.Garcia to be placed. CXR showed tube needed to be advanced. Restraints applied.     1330- Pt becoming increasingly agitated. Versed @ 5mg/hr and propofol @ 50mcg/hr. During pt's agitation, pt removed his two PIV. 10mg Ativan IM ordered. Pt now resting and stable.    1530- During tube advancement, accidental extubation. Pt re-intubated. CXR confirms placement for ETT and OGT.    1620-PICC line placed, CXR confirms placement.

## 2023-05-23 NOTE — EICU
Intervention Initiated From:  Bedside    Promise intervened regarding:  Time-Out    Nurse Notified:  Yes    Doctor Notified:  No    Comments: Called into pts room remotely for a PICC line time out.  BJORN Carreno RN succcessfully placed a R upper arm PICC line.

## 2023-05-23 NOTE — HOSPITAL COURSE
Pt seen s/p 2 seizures in ED. Neuro recommended increasing Vimpat to 200 mg BID and 400 mg Vimpat load. Pt proceeded to have seizure overnight requiring 2 mg lorazepam, despite Vimpat load and increased dose. Pt was started on Keppra 3g load and maintenance Keppra 2g BID. Pt on EEG cap, with readings concerning for status epilepticus. Pt accepted for transfer to Neuro ICU, was intubated and sedated on Midazolam 5 mg/hr, Propofol 50 mcg/kg/min, and Fentanyl 37.5 mg/hr. Had breakthrough seizure overnight from 5/23/23 to 5/24/23 despite sedation and two AEDs, requiring Ativan push. Family Medicine team consulted with Epileptologist at [facility] who recommended starting clobazam 40 mg x1 followed by 20 mg BID in addition to his current Keppra and Vimpat. Onfi preferred over topiramate as it does not cause metabolic acidosis. Another option being to increase Versed and/or Propofol. Fentanyl was discontinued and Onfi was started per recs.    Had elevated troponin which downtrended.  --> 6946 --> 73975 --> 19497. IV LR given and titrated to a goal UOP of 200-300 mL/hr for tx of Rhabdo. Pt had low UOP <100 mL/hr on 5/24/23 and IV LR was increased to 400 mL/hr. CK downtrended to 67350 --> 28327. Goal CK of <5000, recommend trending until goal is reached. Lactate downtrended from 9.7 --> 2.4. Pt was strictly NPO and so home lisinopril 5 mg held, after intubation it was held due to hypotension; would restart as appropriate with OGT. Initially had ALONDRA that resolved, renal function stable. Pt initially on Vanc and Rocephin for 24 hours which was discontinued due to low concern for infection. Blood Cx NGTD x2. LP with Glucose 78 (H), protein 58 (H), gram stain CSF: No WBC or organisms seen. UA negative leuks, negative nitrites, 3+ occult blood, >100 RBC, 28 WBC, few bacteria, 8 hyaline casts.    Pt intubated and sedated in ICU. Tube feeds started via OGT. PICC placed on 5/23/23 and continued on d/c. Will need repeat  BMP and CK, last ordered for 3PM on 5/24/23. Vent settings on discharge: tidal volume 500, PEEP 5, FiO2 21% and RR 16. Vimpat level in process. Pt transferred to Neuro ICU at [facility] for Status Epilepticus.

## 2023-05-23 NOTE — EICU
Intervention Initiated From:  Bedside    Promise intervened regarding:  Respiratory    Nurse Notified:  Yes    Doctor Notified:  Yes    Comments: Called rtemotely into patients room for an intubation by Dr. Ellerman.  The pt was pre sedated with Etomidate and rocuroinium and orally intubated with a 7.5 ETT at a depth of 22 cm.

## 2023-05-23 NOTE — PLAN OF CARE
The pt's currently in ICU confused,so the sw spoke to the pt's mother Sachi Andrade [phone] via phone to complete the assessment. The pt lives in [place] with Edwin Proctor(cousin)[phone]/Efrem Proctor(cousin)[phone] in their  grandfather's home. The pt was living with his mother in [place] but moved back about one month ago. The pt is independent with his adl's and doesn't use dme. The pt had brain surgery 2020 and has a hx of having seizures and frequent hospital admits. She states the pt was receiving medical care at [facility] in [place] prior to moving to [place].The pt's mother doesn't think the pt's compliant with his seizure medications and smokes marijuana daily. She states the pt is very forgetful. The pt drives but his mother states Edwin will transport the pt home at d/c. The pt's mother is currently in town now but states she's leaving today. The sw left her name and contact info with her and encouraged her to call if she has any further questions or concerns. The sw completed the white board in the pt's room with her name and contact info. The sw will continue to follow the pt throughout his transitions of care and will assist with any d/c needs. The pt's mother states she doesn't know the pt's Social Security number.       23 1034   Discharge Assessment   Assessment Type Discharge Planning Assessment   Confirmed/corrected address, phone number and insurance Yes   Confirmed Demographics Correct on Facesheet   Source of Information family   If unable to respond/provide information was family/caregiver contacted? Yes   Contact Name/Number Sachi Andrade(mother)[phone]   When was your last doctors appointment?   (unsure)   Communicated USHA with patient/caregiver Date not available/Unable to determine   Reason For Admission Seizures   People in Home other relative(s)   Do you expect to return to your current living situation? Other (see comments)  (TBD)   Do you have help at  home or someone to help you manage your care at home? Yes   Who are your caregiver(s) and their phone number(s)? Sachi Andrade(mother)[phone]   Prior to hospitilization cognitive status: Unable to Assess   Current cognitive status: Unable to Assess   Equipment Currently Used at Home none   Readmission within 30 days? No   Patient currently being followed by outpatient case management? No   Do you currently have service(s) that help you manage your care at home? No   Do you take prescription medications? Yes   Do you have any problems affording any of your prescribed medications? No   Is the patient taking medications as prescribed?   (unsure)   Who is going to help you get home at discharge? Edwin Proctor(cousin)[phone]/Efrem Proctor(cousin)[phone]   How do you get to doctors appointments? car, drives self   Are you on dialysis? No   Do you take coumadin? No   Discharge Plan A Home with family   Discharge Plan B Other  (TBD)   DME Needed Upon Discharge  other (see comments)  (TBD)   Discharge Plan discussed with: Parent(s)   Name(s) and Number(s) Sachi Andrade(mother)[phone]   Transition of Care Barriers Does not adhere to care plan;Substance Abuse   OTHER   Name(s) of People in Home Edwin Proctor(cousin)[phone]/Efrem Proctor(cousin)[phone]

## 2023-05-23 NOTE — PROCEDURES
Yeison Andrade is a 38 y.o. male patient.    Temp: 98.4 °F (36.9 °C) (05/23/23 1530)  Pulse: 67 (05/23/23 1645)  Resp: (!) 26 (05/23/23 1645)  BP: (!) 145/92 (05/23/23 1645)  SpO2: 100 % (05/23/23 1645)  Weight: 68.5 kg (151 lb 0.2 oz) (05/22/23 1252)  Height: 6' (182.9 cm) (05/22/23 1252)       Intubation    Date/Time: 5/23/2023 4:00 PM  Location procedure was performed: [facility] PULMONARY MEDICINE  Performed by: Justin Ellerman, MD  Authorized by: Justin Ellerman, MD   Assisting provider: Willis Lopez MD  Consent Done: Emergent Situation  Indications: Air leak with minimal tidal volumes.  Intubation method: video-assisted  Patient status: paralyzed (RSI)  Preoxygenation: BVM  Pretreatment meds: propofol.  Sedatives: etomidate  Paralytic: rocuronium  Laryngoscope size: Glide 4  Tube size: 7.0 mm  Tube type: cuffed  Number of attempts: 2  Ventilation between attempts: BVM  Cricoid pressure: no  Cords visualized: yes  Post-procedure assessment: chest rise and CO2 detector  Breath sounds: rales/crackles  Cuff inflated: yes  ETT to lip: 26 cm  Tube secured with: bite block and ETT vargas  Chest x-ray interpreted by me.  Chest x-ray findings: endotracheal tube in appropriate position  Comments: Initial attempts at repositioning the tube under minimal sedation resulted in patient expelling his ETT. We did not have vascular access initially. Patient was spontaneously breathing with blow by from the BVM. We then obtained access, pushed 50 of propofol, and attempted to intubated which was no possible due to patient movement and biting the laryngoscope. We then performed an RSI with the ability to visualize the cords and easily pass a 7.0 ETT. The patient saturations never dropped below 85% during the procedure. ETT was noted to be in good position on CXR.         5/23/2023

## 2023-05-23 NOTE — SUBJECTIVE & OBJECTIVE
Interval History: Pt had N/V overnight, given Zofran. C/o dizziness and 3/10 H/A, given Tylenol. Proceeded to have seizure overnight lasting 1 minute, convulsions ceased with 2 mg Ativan. Able to follow commands post-ictal. Now AAOx3.       Review of Systems   Unable to perform ROS: Mental status change     Objective:     Vital Signs (Most Recent):  Temp: 99.5 °F (37.5 °C) (05/23/23 0715)  Pulse: 65 (05/23/23 0715)  Resp: 19 (05/23/23 0715)  BP: (!) 149/78 (05/23/23 0715)  SpO2: 100 % (05/23/23 0715) Vital Signs (24h Range):  Temp:  [97.5 °F (36.4 °C)-100.8 °F (38.2 °C)] 99.5 °F (37.5 °C)  Pulse:  [] 65  Resp:  [11-50] 19  SpO2:  [79 %-100 %] 100 %  BP: (116-182)/() 149/78     Weight: 68.5 kg (151 lb 0.2 oz)  Body mass index is 20.48 kg/m².    Intake/Output Summary (Last 24 hours) at 5/23/2023 0736  Last data filed at 5/23/2023 0654  Gross per 24 hour   Intake 7047.85 ml   Output 6320 ml   Net 727.85 ml         Physical Exam  Constitutional:       Comments: Nonverbal, altered mental status   HENT:      Head: Normocephalic.      Comments: Previous craniectomy/cranioplasty     Right Ear: External ear normal.      Left Ear: External ear normal.   Eyes:      Comments: Sluggish pupils   Cardiovascular:      Rate and Rhythm: Normal rate.      Pulses: Normal pulses.      Heart sounds: Normal heart sounds.   Pulmonary:      Effort: Pulmonary effort is normal.      Breath sounds: Normal breath sounds. No wheezing.   Neurological:      Mental Status: He is lethargic, disoriented and confused.      GCS: GCS eye subscore is 3. GCS verbal subscore is 2. GCS motor subscore is 6.      Comments: Following verbal commands, responds to painful stimuli  Moving RUE and RLE predominantly           Significant Labs: All pertinent labs within the past 24 hours have been reviewed.    ABGs:   Recent Labs   Lab 05/22/23  1156   PH 7.360   PCO2 37.7   HCO3 21.3*   POCSATURATED 97   BE -4   PO2 92     Blood Culture:   Recent Labs    Lab 05/22/23  0810 05/22/23  0813   LABBLOO No Growth to date No Growth to date     CBC:   Recent Labs   Lab 05/22/23  0805 05/23/23  0414   WBC 18.05* 20.64*   HGB 13.6* 13.2*   HCT 42.1 39.2*    227     CMP:   Recent Labs   Lab 05/22/23  0805 05/22/23  1840 05/23/23  0414    144 139   K 4.2 4.0 3.3*   * 110 104   CO2 13* 22* 25    107 112*   BUN 24* 19 11   CREATININE 2.3* 1.4 1.2   CALCIUM 10.5 8.9 9.5   PROT 7.7  --  7.2   ALBUMIN 4.9  --  4.3   BILITOT 1.1*  --  1.7*   ALKPHOS 56  --  54*   AST 31  --  343*   ALT 23  --  88*   ANIONGAP 21* 12 10     Lactic Acid:   Recent Labs   Lab 05/22/23  0805 05/22/23  1241   LACTATE 9.7* 2.4*     Magnesium:   Recent Labs   Lab 05/22/23  0805 05/23/23  0414   MG 2.9* 1.7     Pathology Results  (Last 10 years)      None          Troponin:   Recent Labs   Lab 05/22/23  0805 05/22/23  1241 05/22/23  1840   TROPONINI 0.141* 0.585* 0.504*     TSH:   Recent Labs   Lab 05/22/23  0805   TSH 1.382     Urine Studies:   Recent Labs   Lab 05/22/23  0911   COLORU Yellow   APPEARANCEUA Hazy*   PHUR 7.0   SPECGRAV 1.015   PROTEINUA 3+*   GLUCUA Trace*   KETONESU Negative   BILIRUBINUA Negative   OCCULTUA 3+*   NITRITE Negative   UROBILINOGEN Negative   LEUKOCYTESUR Negative   RBCUA >100*   WBCUA 28*   BACTERIA Few*   SQUAMEPITHEL 1   HYALINECASTS 8*       Significant Imaging: I have reviewed all pertinent imaging results/findings within the past 24 hours.

## 2023-05-23 NOTE — PLAN OF CARE
Pt was supposed to be transferred to [facility] main campus today for continuous EEG. However, there are no beds available. Before, intubation, pt was lethargic but easily arousable and following commands in all extremities. Since EEG cap is currently no readable due to artifact, would recommend stat EEG to further titrate antiseizure medications. Until then, continue Vimpat 200 mg BID and Keppra 2g BID.     Luis Winkler MD  [employer] Neurology PGY-3

## 2023-05-23 NOTE — CARE UPDATE
Updated pt's mother Sachi ([phone]) in regards to pt's clinical status and progression in the ICU. All questions answered.      Arabella Becerra MD, MPH  [facility] PGY-1  05/22/2023

## 2023-05-23 NOTE — PROCEDURES
"Yeison Andrade is a 38 y.o. male patient.    Temp: 98.6 °F (37 °C) (23 1100)  Pulse: 110 (23 1255)  Resp: (!) 26 (23 1354)  BP: (!) 214/114 (23 1246)  SpO2: 100 % (23 1255)  Weight: 68.5 kg (151 lb 0.2 oz) (23 1252)  Height: 6' (182.9 cm) (23 1252)       Intubation    Date/Time: 2023 12:30 PM  Location procedure was performed: [facility] PULMONARY MEDICINE  Performed by: Justin Ellerman, MD  Authorized by: Justin Ellerman, MD   Consent Done: Yes  Consent: Verbal consent obtained.  Risks and benefits: risks, benefits and alternatives were discussed  Consent given by: mother  Patient understanding: patient states understanding of the procedure being performed  Patient consent: the patient's understanding of the procedure matches consent given  Test results: test results available and properly labeled  Imaging studies: imaging studies available  Required items: required blood products, implants, devices, and special equipment available  Patient identity confirmed: , MRN and name  Time out: Immediately prior to procedure a "time out" was called to verify the correct patient, procedure, equipment, support staff and site/side marked as required.  Indications: airway protection  Intubation method: video-assisted  Patient status: paralyzed (RSI)  Preoxygenation: BVM  Sedatives: etomidate  Paralytic: rocuronium  Laryngoscope size: Glide 4  Tube size: 7.5 mm  Tube type: cuffed  Number of attempts: 1  Cords visualized: yes  Post-procedure assessment: chest rise and CO2 detector  Breath sounds: clear  Cuff inflated: yes  ETT to lip: 23 cm  ETT to teeth: 22 cm  Tube secured with: ETT vargas  Chest x-ray interpreted by me.  Chest x-ray findings: endotracheal tube too high  Complications: No  Specimens: No    ETT at 7cm, will push down by 2cm     2023    "

## 2023-05-23 NOTE — PROCEDURES
EEG update:    EEG cap previously showed ictal/interictal continuum with periodic discharges and electrographic seizures arising from the right hemisphere.  However, the study at present is not interpretable due to artifact despite replacement of EEG cap and multiple attempts at repositioning.  It is unclear whether patient is responding to treatment.  Recommend transfer to main campus for long term monitoring; close monitoring in the ICU recommended due to tenuous clinical status.      Brenda Christian MD  [facility]   Department of Neurology/Epilepsy

## 2023-05-23 NOTE — PROGRESS NOTES
Therapy with vancomycin complete and/or consult discontinued by provider.  Pharmacy will sign off, please re-consult as needed.    Lynn Mohr, PharmD, BCPS  Clinical Pharmacist  [phone]

## 2023-05-23 NOTE — NURSING
Notified On Call MD, Dr. Street, of patient having *2 episodes of nausea & vomiting.  PO Zofran given with mild relief.    Also notified MD of patient being restless in bed, complaining of dizziness & headaches 3/10. PO tylenol given with mild relief.    No furthers orders received.

## 2023-05-23 NOTE — CARE UPDATE
Patient was witnessed by nursing having a seizure. Event lasted roughly 1 minute and 2 mg Ativan was given. Convulsions ceased. On assessment patient was difficult to rouse, but did open his eyes and give a thumbs up. Patient withdrew from pain and moved a blanket to cover himself when it was pulled down. Suspect patient is now postictal from seizure.

## 2023-05-23 NOTE — NURSING
Notified On Call MD, Dr. Street of patient having had a witnessed seizure that lasted about 1 minute with 2 mg IV Ativan given.     Pt is now lethargic, not following commands.  No furthers orders received.

## 2023-05-23 NOTE — PROGRESS NOTES
Pulm/CC Fellow Consult Note    Attending Physician: Andi Lou III, MD    Date of Admit: 2023    Reason for Consult: Status Epilepticus    History of Present Illness:  Yeison Andrade is a 38 y.o.  male with a PMHx SDH/TBI s/p craniectomy 2020, Bipolar disorder, HTN, and known history of seizure since his SDH presented to the ER at [place] on  as an unknown person s/p two seizures. He had a witness generalized seizure by EMS and was given 10mg IM versed in the field prior to arrival. On arrival to the ER, the patient was minimally responsive with labs that supported a recent seizure like episode. Because of his continued lack of arousal, he was admitted to the ICU for close monitoring.      Record review indicates that this is Yeison Andrade,  1985, with [place] [facility] [place]. He was last seen by [facility] on 4/3/2023 where he had Keppra switched to VimPat secondary to side effects. He is also noted to have a recent [facility] admission 3/2/23 for seizures after he ran out of his Keppra.     Record review indicates that he was recently a social drinker, less than half a pack per day smoker, and daily marijuana user.     Subjective:  Pt had recurrent seizure overnight as well as recurrent seizure like activity on the EEG. Discussed with neurology and the plans are to start a Keppra load now.     Past Medical History:  Past Medical History:   Diagnosis Date    Hypertension     Seizures        Past Surgical History:  History reviewed. No pertinent surgical history.    Allergies:  Review of patient's allergies indicates:  No Known Allergies    Family History:  History reviewed. No pertinent family history.    Social History:     Objective:   Last 24 Hour Vital Signs:  BP  Min: 147/90  Max: 214/114  Temp  Av.8 °F (37.1 °C)  Min: 97.5 °F (36.4 °C)  Max: 99.9 °F (37.7 °C)  Pulse  Av.3  Min: 52  Max: 110  Resp  Av.6  Min: 11  Max: 50  SpO2  Av.8 %  Min: 79 %  Max:  100 %  Body mass index is 20.48 kg/m².  I/O last 3 completed shifts:  In: 7047.9 [I.V.:2930.5; IV Piggyback:4117.4]  Out: 6320 [Urine:6120; Emesis/NG output:200]    Physical Exam:  General: Alert and oriented when aroused, easily arousable  HENT:                   NC; R scar through hairline from previous craniotomy noted;  anicteric sclera; no nystagmus noted, sluggish pupillary response, equal bilaterally  Cardio:                 Regular rate and rhythm with normal S1 and S2; no murmurs or rubs  Resp:                    CTAB; respirations unlabored; no wheezes, crackles or rhonchi  Abdom:                Soft, Non-tender  Extrem:                WWP with no clubbing, cyanosis or edema  Pulses:                  2+ and symmetric distally  Neuro:                  Minimally arousable in the ED; AO x 3 after transfer up to the ICU; cooperative and pleasant; no focal deficits noted       Assessment & Plan:   39yo M w/ PMH of SDH/craniectomy and subsequent seizure disorder who presents with recurrent seizures.      Neuro  #TBI/SDH s/p craniectomy 2020  #Epilepsy  Patient had recurrent seizures x 2 in the outpatient setting, given 10mg IV versed with no recurrence and improvement of his mentation in the post-ictal setting. He was recently changed to Vimpat by [facility] neurology last month, vimpat levels pending. Unclear if this recurrence was therapeutic failure, subtherapeutic levels, medication non-compliance, or other precipitating factors. Patient now with recurring seizures on two AEDs. Recommendation from neurology and epileptology is to intubate the patient and place him on a versed drip.   - intubation today  - Vimpat BID  - Keppra load plus maintenance  - versed drip   - follow up with neurology      [facility]  - pt remains HD stable without need for intervention     Pulm  #Tobacco use  - will discuss tobacco and marijuana use when patient is more awake     GI  - no issues noted     Renal  #Metabolic acidosis  - likely 2/2 to  lactic acidosis which is resolving     #ALONDRA  #Rhabdomyolysis  Baseline Cr around 0.9. Now with new ALONDRA, increasing CPK likely secondary to his generalized seizure. CPK continues to increase with expected peak at 72 hours. Continue aggressive IVF rehydration and target higher urine outputs until the CPK is down-trending and less than 5000.   - IVFs to target -300cc/hr  - repeat CPK, BMP q6 until improvement  - avoid volume overload      Heme  #Normocytic anemia  - Baseline Hb around 12     ID  #Leukocytosis   - Likely secondary to seizure disorder  - No evidence of infection at this time     Endocrine  - No known issues     Disposition: Patient is now intubated and sedated. On versed at 5 and propofol at 50. Awaiting further neurology recommendations while awaiting patient transfer to the [facility] ICU for refractory status.     Thank you for this consultation. Please contact me at [phone] for any further assistance.     Justin Ellerman, MD  Fellow  [facility]

## 2023-05-23 NOTE — PROCEDURES
[facility] EEG/VIDEO MONITORING REPORT  Yeison Andrade  [id number]  1985    DATE OF SERVICE: 5/22/23-5/23/23  DATE OF ADMISSION: 5/22/2023  7:30 AM    ADMITTING/REQUESTING PROVIDER: Andi Lou III, MD    REASON FOR CONSULT: 38 year old male with history of prior SDH presenting with status epilepticus.     METHODOLOGY   Electroencephalographic (EEG) recording is with electrodes placed according to the International 10-20 placement system.  Thirty two (32) channels of digital signal (sampling rate of 512/sec) including T1 and T2 was simultaneously recorded from the scalp and may include  EKG, EMG, and/or eye monitors.  Recording band pass was 0.1 to 512 hz.  Digital video recording of the patient is simultaneously recorded with the EEG.  The patient is instructed report clinical symptoms which may occur during the recording session.  EEG and video recording is stored and archived in digital format.  Activation procedures which include photic stimulation, hyperventilation and instructing patients to perform simple task are done in selected patients.   The EEG is displayed on a monitor screen and can be reviewed using different montages.  Computer assisted analysis is employed to detect spike and electrographic seizure activity.   The entire record is submitted for computer analysis.  The entire recording is visually reviewed and the times identified by computer analysis as being spikes or seizures are reviewed again.  Compresses spectral analysis (CSA) is also performed on the activity recorded from each individual channel.  This is displayed as a power display of frequencies from 0 to 30 Hz over time.   The CSA is reviewed looking for asymmetries in power between homologous areas of the scalp and then compared with the original EEG recording.     Revel Touch software is also utilized in the review of this study.  This software suite analyzes the EEG recording in multiple domains.  Coherence and rhythmicity is computed to  identify EEG sections which may contain organized seizures.  Each channel undergoes analysis to detect presence of spike and sharp waves which have special and morphological characteristic of epileptic activity.  The routine EEG recording is converted from spacial into frequency domain.  This is then displayed comparing homologous areas to identify areas of significant asymmetry.  Algorithm to identify non-cortically generated artifact is used to separate eye movement, EMG and other artifact from the EEG.      RECORDING TIMES  5/22/23 at 16:30, 16:52 - 22 minutes  Start on 5/22/23 at 13:45 - 15:47, 1 hour 59 minutes  Start on 5/22/23 at 19:03  Stop on 5/23/23 at 07:00 - 11 hours 57 minutes  Start on 5/23/23 at 07:00  Stop on 5/23/23 at 16:43    A total of 23 hrs of EEG recording is obtained.    EEG FINDINGS  Background activity:   Continuous high amplitude discharges (2-2.5 hz) over the right hemisphere, with overriding fast activity and at times with evolution into recurrent focal seizures (at which time some activity can be seen in the left posterior region).  The left hemisphere consists of a mixture of delta and theta activity with no clear posterior dominant rhythm present.        Evolution:      Sleep:  No clear sleep architecture appreciated    Activation procedures:   Hyperventilation is not performed  Photic stimulation is not performed    Cardiac Monitor:   Electrode caps do not have EKG capabilities    Events:  Per report, patient had a clinical seizure around 5 in the morning.  Unfortunately cap is not interpretable during this time due to artifact and event button was not pressed.      Impression:   Abnormal study consistent with focal electrographic status in the right hemisphere.  Recurrent electrographic seizures are seen in this region, and in between seizures there is continuous periodic epileptiform activity.  There is also evidence of a severe diffuse encephalopathy.  Of note, portions of the  study are not interpretable due to excessive electrode/EMG artifact (particularly after midnight).  Recommend escalation of AED therapy and cap repositioning.     Brenda Christian MD  [facility]   Department of Neurology

## 2023-05-23 NOTE — PROGRESS NOTES
[facility] Medicine  Progress Note    Patient Name: Yeison Andrade  MRN: [id number]  Patient Class: IP- Inpatient   Admission Date: 5/22/2023  Length of Stay: 1 days  Attending Physician: Andi Lou III, MD  Primary Care Provider: No primary care provider on file.        Subjective:     Principal Problem:Seizure      HPI:  37 yo M w/ PMHx of SDH/TBI s/p craniectomy 12/2020, Bipolar disorder, HTN, and known history of seizure presented to [facility] ED on 5/22/23 without identifiable information after witnessed seizure. Was naked on presentation and incontinent of urine. Pt could not be identifed on presentation, was brought in by friend who also did not provide any information. Was given 10 mg Versed IM via EMS. Was obtunded and minimally responsive to painful stimuli.    Pt later was identified [place] [facility] [place] Last seen by [facility] in April 2023 and was switched to Vimpat from Keppra. Had previous [facility] hospitalization for seizures 2/2 to running out of Keppra.     When seen in ED, pt was still obtunded with sluggish pupils. Responded to noxious stimuli. Only moving Right side of body, unclear if that is is his baseline. GCS 11 - E3, V2, M6. Following verbal commands.    In ED, initial vitals were /72, , RR 40, temp 100.8 F (38.2) and SpO2 92%. Labs: WBC 18, glucose 107. Troponin 0.141, lactate 9.7, , procal negative. TSH 1.382. UA negative nitrite, >100 RBC, 28 WBC. Flu and covid negative. Blood cultures in process. EKG: No STEMI, sinus tachycardia. CTH: no acute findings. Extensive Right hemispheric encephalomalacia. Pt admitted to [facility] for seizures and AMS, requiring ICU level care.      Overview/Hospital Course:  No notes on file    Interval History: Pt had N/V overnight, given Zofran. C/o dizziness and 3/10 H/A, given Tylenol. Proceeded to have seizure overnight lasting 1 minute, convulsions ceased with 2 mg Ativan. Able to follow commands  post-ictal. Now AAOx3.     Review of Systems   Constitutional:  Negative for chills and fever.   Respiratory:  Negative for shortness of breath and wheezing.    Cardiovascular:  Negative for chest pain and leg swelling.   Gastrointestinal:  Positive for nausea and vomiting.   Neurological:  Positive for dizziness, seizures and headaches.       Objective:     Vital Signs (Most Recent):  Temp: 99.5 °F (37.5 °C) (05/23/23 0715)  Pulse: 65 (05/23/23 0715)  Resp: 19 (05/23/23 0715)  BP: (!) 149/78 (05/23/23 0715)  SpO2: 100 % (05/23/23 0715) Vital Signs (24h Range):  Temp:  [97.5 °F (36.4 °C)-100.8 °F (38.2 °C)] 99.5 °F (37.5 °C)  Pulse:  [] 65  Resp:  [11-50] 19  SpO2:  [79 %-100 %] 100 %  BP: (116-182)/() 149/78     Weight: 68.5 kg (151 lb 0.2 oz)  Body mass index is 20.48 kg/m².    Intake/Output Summary (Last 24 hours) at 5/23/2023 0736  Last data filed at 5/23/2023 0654  Gross per 24 hour   Intake 7047.85 ml   Output 6320 ml   Net 727.85 ml         Physical Exam  Constitutional:       Comments: Initially confused post-ictal but now AAOx3  HENT:      Head: Normocephalic.      Comments: Previous craniectomy/cranioplasty     Right Ear: External ear normal.      Left Ear: External ear normal.   Eyes:      Comments: Sluggish pupils   Cardiovascular:      Rate and Rhythm: Normal rate.      Pulses: Normal pulses.      Heart sounds: Normal heart sounds.   Pulmonary:      Effort: Pulmonary effort is normal.      Breath sounds: Normal breath sounds. No wheezing.   Neurological:      Mental Status: AAOx3      Comments: Following verbal commands. Able to move all extremities          Significant Labs: All pertinent labs within the past 24 hours have been reviewed.    ABGs:   Recent Labs   Lab 05/22/23  1156   PH 7.360   PCO2 37.7   HCO3 21.3*   POCSATURATED 97   BE -4   PO2 92     Blood Culture:   Recent Labs   Lab 05/22/23  0810 05/22/23  0813   LABBLOO No Growth to date No Growth to date     CBC:   Recent Labs    Lab 05/22/23  0805 05/23/23  0414   WBC 18.05* 20.64*   HGB 13.6* 13.2*   HCT 42.1 39.2*    227     CMP:   Recent Labs   Lab 05/22/23  0805 05/22/23  1840 05/23/23  0414    144 139   K 4.2 4.0 3.3*   * 110 104   CO2 13* 22* 25    107 112*   BUN 24* 19 11   CREATININE 2.3* 1.4 1.2   CALCIUM 10.5 8.9 9.5   PROT 7.7  --  7.2   ALBUMIN 4.9  --  4.3   BILITOT 1.1*  --  1.7*   ALKPHOS 56  --  54*   AST 31  --  343*   ALT 23  --  88*   ANIONGAP 21* 12 10     Lactic Acid:   Recent Labs   Lab 05/22/23  0805 05/22/23  1241   LACTATE 9.7* 2.4*     Magnesium:   Recent Labs   Lab 05/22/23  0805 05/23/23  0414   MG 2.9* 1.7     Pathology Results  (Last 10 years)      None          Troponin:   Recent Labs   Lab 05/22/23  0805 05/22/23  1241 05/22/23  1840   TROPONINI 0.141* 0.585* 0.504*     TSH:   Recent Labs   Lab 05/22/23  0805   TSH 1.382     Urine Studies:   Recent Labs   Lab 05/22/23  0911   COLORU Yellow   APPEARANCEUA Hazy*   PHUR 7.0   SPECGRAV 1.015   PROTEINUA 3+*   GLUCUA Trace*   KETONESU Negative   BILIRUBINUA Negative   OCCULTUA 3+*   NITRITE Negative   UROBILINOGEN Negative   LEUKOCYTESUR Negative   RBCUA >100*   WBCUA 28*   BACTERIA Few*   SQUAMEPITHEL 1   HYALINECASTS 8*       Significant Imaging: I have reviewed all pertinent imaging results/findings within the past 24 hours.      Assessment/Plan:      37 yo M w/ PMHx of SDH/TBI s/p craniectomy 12/2020, Bipolar disorder, HTN, and known history of seizure currently in ICU for status epilepticus. Had seizure overnight despite Vimpat load and increased dose. Now on 2 AEDs (Vimpat and Keppra).      Neuro/Psych    #Seizures  Witnessed seizure, Urinary incontinence on presentation  S/p 10 mg Versed IM via EMS   --> 6946 --> 19926 --> 70806  Lactate 9.7 --> 2.4    - Continue Vimpat 200 mg BID  - IV Keppra load 3g given  - Maintenance Keppra 2g BID  - Remain strictly NPO for now  - Neuro following, appreciate recs  - Pt accepted to  Neuro ICU for transfer, recommend intubation beforehand  - Neuro checks  - Lorazepam PRN  - Seizure precautions  - EEG      #Altered mental status 2/2 to postictal state    Recent Labs     05/22/23  1156   PH 7.360   PCO2 37.7   PO2 92   HCO3 21.3*   POCSATURATED 97   BE -4     See Seizure and ID for further info      CV    #Elevated troponin - RESOLVED  Trop 0.141   - Downtrended    #HTN  - Holding home lisinopril 5 mg while strictly NPO      Pulm  No acute issues  Satting 100% on RA  Plan for intubation prior to transfer to Neuro ICU    FEN/GI  Diet: NPO  Keep Mg 2, K 4  Zofran prn for n/v   K 3.3; replete PRN      RENAL    I/O last 3 completed shifts:  In: 7047.9 [I.V.:2930.5; IV Piggyback:4117.4]  Out: 6320 [Urine:6120; Emesis/NG output:200]  No intake/output data recorded.      Intake/Output Summary (Last 24 hours) at 5/23/2023 0856  Last data filed at 5/23/2023 0654  Gross per 24 hour   Intake 7047.85 ml   Output 6320 ml   Net 727.85 ml     Strict I&Os  Avoid renal toxic meds  BUN/Cr: 11/1.2, stable  Continue to monitor renal status and urine output    #ALONDRA (acute kidney injury) - RESOLVED  Initial BUN/Cr 24/2.3 on admission  - Maintain  mL/hr  - Hold nephrotoxic medications  - Bladder scan if suspicion of retention, followed by retroperitoneal US to evaluate for Hydronephrosis. Garcia if retention.   - Avoid Hypotension  - Strict I/O's  - Monitor renal function    #Rhabdomyolysis   --> 6946 --> 19926 --> 95854  1L LR bolus followed by IV LR for 10 hours  - Trend CK  - Titrate IVF to goal UOP of 200 mL/hr  - Continue IV LR      Heme  H/H 13.2/39.2  WBC 20.64 likely 2/2 seizures  DVT prophylaxis: Lovenox    Endo  No known issues at this time.  Maintain glucose 140-180      ID  No known issues at this time.   Blood Cx NGTD x1  LP with Glucose 78 (H), protein 58 (H), gram stain CSF: No WBC or organisms seen  UA negative leuks, negative nitrites, 3+ occult blood, >100 RBC, 28 WBC, few bacteria, 8  hyaline casts  Discontinue Vanc and Rocephin today 5/23/23      Feeding: NPO  Analgesia: Tylenol  Sedation: None  Thrombo PPX: Lovenox  Head of Bed: >30 degrees  Ulcer PPX: None  Glucose: goal 140-180  SBT/SAT: N/A  Bowel Regimen: None  Indwelling Lines: PIV  Abx: D/c today      Code Status: Full      Dispo  Remain in ICU at this time with EEG cap and seizure precautions, follow up Neuro recs. Accepted to Neuro ICU for transfer for Status Epilepticus, planning to intubate pt beforehand.        VTE Risk Mitigation (From admission, onward)           Ordered     Place sequential compression device  Until discontinued         05/22/23 1117                          Arabella Becerra MD  Department of Hospital Medicine   [facility] - Intensive Care

## 2023-05-23 NOTE — PROCEDURES
Yeison Andrade is a 38 y.o. male patient.    Temp: 98.4 °F (36.9 °C) (05/23/23 1530)  Pulse: 85 (05/23/23 1600)  Resp: (!) 26 (05/23/23 1600)  BP: (!) 166/100 (05/23/23 1600)  SpO2: 100 % (05/23/23 1600)  Weight: 68.5 kg (151 lb 0.2 oz) (05/22/23 1252)  Height: 6' (182.9 cm) (05/22/23 1252)    PICC  Date/Time: 5/23/2023 4:05 PM  Performed by: Clifton Carreno RN  Consent Done: Yes  Time out: Immediately prior to procedure a time out was called to verify the correct patient, procedure, equipment, support staff and site/side marked as required  Indications: med administration and vascular access  Anesthesia: local infiltration  Local anesthetic: lidocaine 1% without epinephrine  Anesthetic Total (mL): 2  Preparation: skin prepped with chlorhexidine (without alcohol)  Skin prep agent dried: skin prep agent completely dried prior to procedure  Sterile barriers: all five maximum sterile barriers used - cap, mask, sterile gown, sterile gloves, and large sterile sheet  Hand hygiene: hand hygiene performed prior to central venous catheter insertion  Location details: right brachial  Catheter type: triple lumen  Catheter size: 5 Fr  Catheter Length: 35cm    Ultrasound guidance: yes  Vessel Caliber: medium and patent, compressibility normal  Vascular Doppler: not done  Needle advanced into vessel with real time Ultrasound guidance.  Guidewire confirmed in vessel.  Sterile sheath used.  no esophageal manometryNumber of attempts: 1  Post-procedure: blood return through all ports, chlorhexidine patch and sterile dressing applied          Name CARIE OLIVIA  5/23/2023

## 2023-05-23 NOTE — NURSING
Report received from nurse Heri RN.    Pt in bed, AAO to person and situation only, still slightly drowsy but following commands.  LR running at 500 ml/H, condom catheter present, draining clear yellow urine.  Bedside EEG in progress.    Initial assessment in progress.  Will continue to monitor.

## 2023-05-23 NOTE — PROGRESS NOTES
Pharmacokinetic Assessment Follow Up: IV Vancomycin    Vancomycin serum concentration assessment(s):    The random level was drawn correctly and can be used to guide therapy at this time. The measurement is below the desired definitive target range of 15 to 20 mcg/mL.    Vancomycin Regimen Plan:  Patient's Scr improved significantly.   Change regimen to Vancomycin 1000 mg IV every 12 hours with next serum trough concentration measured at 2000 prior to 4th dose on 5/24    Drug levels (last 3 results):  Recent Labs   Lab Result Units 05/23/23  0413   Vancomycin, Random ug/mL 6.9       Pharmacy will continue to follow and monitor vancomycin.    Please contact pharmacy at extension [phone] for questions regarding this assessment.    Thank you for the consult,   Lynn Mohr       Patient brief summary:  Yeison Andrade is a 38 y.o. male initiated on antimicrobial therapy with IV Vancomycin for treatment of sepsis    The patient's current regimen is pulse dose    Drug Allergies:   Review of patient's allergies indicates:  No Known Allergies    Actual Body Weight:   68.5 kg    Renal Function:   Estimated Creatinine Clearance: 80.9 mL/min (based on SCr of 1.2 mg/dL).,     Dialysis Method (if applicable):  N/A    CBC (last 72 hours):  Recent Labs   Lab Result Units 05/22/23  0805 05/23/23  0414   WBC K/uL 18.05* 20.64*   Hemoglobin g/dL 13.6* 13.2*   Hematocrit % 42.1 39.2*   Platelets K/uL 294 227   Gran % % 83.7* 71.0   Lymph % % 9.8* 15.0*   Mono % % 4.9 5.0   Eosinophil % % 0.3 8.0   Basophil % % 0.4 1.0   Differential Method  Automated Manual       Metabolic Panel (last 72 hours):  Recent Labs   Lab Result Units 05/22/23  0805 05/22/23  0911 05/22/23  1008 05/22/23  1840 05/23/23  0414   Sodium mmol/L 145  --   --  144 139   Potassium mmol/L 4.2  --   --  4.0 3.3*   Chloride mmol/L 111*  --   --  110 104   CO2 mmol/L 13*  --   --  22* 25   Glucose mg/dL 107  --   --  107 112*   Glucose, CSF mg/dL  --   --  78*  --   --     Glucose, UA   --  Trace*  --   --   --    BUN mg/dL 24*  --   --  19 11   Creatinine mg/dL 2.3*  --   --  1.4 1.2   Creatinine, Urine mg/dL  --  114.2  --   --   --    Albumin g/dL 4.9  --   --   --  4.3   Total Bilirubin mg/dL 1.1*  --   --   --  1.7*   Alkaline Phosphatase U/L 56  --   --   --  54*   AST U/L 31  --   --   --  343*   ALT U/L 23  --   --   --  88*   Magnesium mg/dL 2.9*  --   --   --  1.7   Phosphorus mg/dL 3.8  --   --   --  2.7       Vancomycin Administrations:  vancomycin given in the last 96 hours                     vancomycin 2 g in dextrose 5 % 500 mL IVPB (mg) 2,000 mg New Bag 05/22/23 1051                    Microbiologic Results:  Microbiology Results (last 7 days)       Procedure Component Value Units Date/Time    Blood Culture #2 **CANNOT BE ORDERED STAT** [[id number]] Collected: 05/22/23 0813    Order Status: Completed Specimen: Blood from Peripheral, Forearm, Left Updated: 05/22/23 1715     Blood Culture, Routine No Growth to date    Blood Culture #1 **CANNOT BE ORDERED STAT** [[id number]] Collected: 05/22/23 0810    Order Status: Completed Specimen: Blood from Peripheral, Hand, Right Updated: 05/22/23 1715     Blood Culture, Routine No Growth to date    CSF culture and Gram Stain (Tube 2) [[id number]] Collected: 05/22/23 1054    Order Status: Completed Specimen: CSF (Spinal Fluid) from CSF Tap, Tube 2 Updated: 05/22/23 1710     Gram Stain Result No WBC's      No organisms seen    Narrative:      On which sequentially labeled tube should this analysis be  performed?->2

## 2023-05-24 ENCOUNTER — HOSPITAL ENCOUNTER (INPATIENT)
Facility: HOSPITAL | Age: 38
LOS: 7 days | Discharge: NURSING, REHAB OR LONG-TERM CARE | DRG: 101 | End: 2023-05-31
Attending: INTERNAL MEDICINE | Admitting: INTERNAL MEDICINE
Payer: MEDICAID

## 2023-05-24 VITALS
WEIGHT: 151 LBS | RESPIRATION RATE: 16 BRPM | OXYGEN SATURATION: 100 % | HEIGHT: 72 IN | BODY MASS INDEX: 20.45 KG/M2 | DIASTOLIC BLOOD PRESSURE: 79 MMHG | HEART RATE: 74 BPM | TEMPERATURE: 98 F | SYSTOLIC BLOOD PRESSURE: 136 MMHG

## 2023-05-24 DIAGNOSIS — G40.901 STATUS EPILEPTICUS: ICD-10-CM

## 2023-05-24 DIAGNOSIS — R56.9 SEIZURES: Primary | ICD-10-CM

## 2023-05-24 PROBLEM — Z99.11 ON MECHANICALLY ASSISTED VENTILATION: Status: ACTIVE | Noted: 2023-05-24

## 2023-05-24 PROBLEM — M62.82 NON-TRAUMATIC RHABDOMYOLYSIS: Status: ACTIVE | Noted: 2023-05-24

## 2023-05-24 LAB
ALBUMIN SERPL BCP-MCNC: 2.9 G/DL (ref 3.5–5.2)
ALLENS TEST: ABNORMAL
ALP SERPL-CCNC: 40 U/L (ref 55–135)
ALT SERPL W/O P-5'-P-CCNC: 70 U/L (ref 10–44)
ANION GAP SERPL CALC-SCNC: 6 MMOL/L (ref 8–16)
ANION GAP SERPL CALC-SCNC: 8 MMOL/L (ref 8–16)
AST SERPL-CCNC: 219 U/L (ref 10–40)
BASOPHILS # BLD AUTO: 0.03 K/UL (ref 0–0.2)
BASOPHILS NFR BLD: 0.3 % (ref 0–1.9)
BILIRUB SERPL-MCNC: 1.4 MG/DL (ref 0.1–1)
BUN SERPL-MCNC: 11 MG/DL (ref 6–20)
BUN SERPL-MCNC: 9 MG/DL (ref 6–20)
CALCIUM SERPL-MCNC: 8.4 MG/DL (ref 8.7–10.5)
CALCIUM SERPL-MCNC: 8.6 MG/DL (ref 8.7–10.5)
CHLORIDE SERPL-SCNC: 109 MMOL/L (ref 95–110)
CHLORIDE SERPL-SCNC: 110 MMOL/L (ref 95–110)
CHOLEST SERPL-MCNC: 133 MG/DL (ref 120–199)
CHOLEST/HDLC SERPL: 3.9 {RATIO} (ref 2–5)
CK SERPL-CCNC: ABNORMAL U/L (ref 20–200)
CK SERPL-CCNC: ABNORMAL U/L (ref 20–200)
CO2 SERPL-SCNC: 24 MMOL/L (ref 23–29)
CO2 SERPL-SCNC: 26 MMOL/L (ref 23–29)
CREAT SERPL-MCNC: 1.1 MG/DL (ref 0.5–1.4)
CREAT SERPL-MCNC: 1.2 MG/DL (ref 0.5–1.4)
DELSYS: ABNORMAL
DIFFERENTIAL METHOD: ABNORMAL
EOSINOPHIL # BLD AUTO: 0.2 K/UL (ref 0–0.5)
EOSINOPHIL NFR BLD: 2.4 % (ref 0–8)
ERYTHROCYTE [DISTWIDTH] IN BLOOD BY AUTOMATED COUNT: 12.6 % (ref 11.5–14.5)
ERYTHROCYTE [SEDIMENTATION RATE] IN BLOOD BY WESTERGREN METHOD: 20 MM/H
EST. GFR  (NO RACE VARIABLE): >60 ML/MIN/1.73 M^2
EST. GFR  (NO RACE VARIABLE): >60 ML/MIN/1.73 M^2
ESTIMATED AVG GLUCOSE: 82 MG/DL (ref 68–131)
FIO2: 21
GLUCOSE SERPL-MCNC: 87 MG/DL (ref 70–110)
GLUCOSE SERPL-MCNC: 90 MG/DL (ref 70–110)
HBA1C MFR BLD: 4.5 % (ref 4–5.6)
HCO3 UR-SCNC: 27.1 MMOL/L (ref 24–28)
HCT VFR BLD AUTO: 32.6 % (ref 40–54)
HDLC SERPL-MCNC: 34 MG/DL (ref 40–75)
HDLC SERPL: 25.6 % (ref 20–50)
HGB BLD-MCNC: 10.8 G/DL (ref 14–18)
IMM GRANULOCYTES # BLD AUTO: 0.02 K/UL (ref 0–0.04)
IMM GRANULOCYTES NFR BLD AUTO: 0.2 % (ref 0–0.5)
LACOSAMIDE: 2 MCG/ML (ref 1–10)
LDLC SERPL CALC-MCNC: ABNORMAL MG/DL (ref 63–159)
LYMPHOCYTES # BLD AUTO: 2.3 K/UL (ref 1–4.8)
LYMPHOCYTES NFR BLD: 24.3 % (ref 18–48)
MAGNESIUM SERPL-MCNC: 1.5 MG/DL (ref 1.6–2.6)
MCH RBC QN AUTO: 29.2 PG (ref 27–31)
MCHC RBC AUTO-ENTMCNC: 33.1 G/DL (ref 32–36)
MCV RBC AUTO: 88 FL (ref 82–98)
MIN VOL: 9.9
MODE: ABNORMAL
MONOCYTES # BLD AUTO: 0.4 K/UL (ref 0.3–1)
MONOCYTES NFR BLD: 4.3 % (ref 4–15)
NEUTROPHILS # BLD AUTO: 6.5 K/UL (ref 1.8–7.7)
NEUTROPHILS NFR BLD: 68.5 % (ref 38–73)
NONHDLC SERPL-MCNC: 99 MG/DL
NRBC BLD-RTO: 0 /100 WBC
PCO2 BLDA: 29.8 MMHG (ref 35–45)
PEEP: 5
PH SMN: 7.57 [PH] (ref 7.35–7.45)
PHOSPHATE SERPL-MCNC: 1.7 MG/DL (ref 2.7–4.5)
PLATELET # BLD AUTO: 175 K/UL (ref 150–450)
PMV BLD AUTO: 10 FL (ref 9.2–12.9)
PO2 BLDA: 86 MMHG (ref 80–100)
POC BE: 5 MMOL/L
POC SATURATED O2: 98 % (ref 95–100)
POC TCO2: 28 MMOL/L (ref 23–27)
POTASSIUM SERPL-SCNC: 3.1 MMOL/L (ref 3.5–5.1)
POTASSIUM SERPL-SCNC: 3.9 MMOL/L (ref 3.5–5.1)
PROT SERPL-MCNC: 4.9 G/DL (ref 6–8.4)
RBC # BLD AUTO: 3.7 M/UL (ref 4.6–6.2)
SAMPLE: ABNORMAL
SITE: ABNORMAL
SODIUM SERPL-SCNC: 141 MMOL/L (ref 136–145)
SODIUM SERPL-SCNC: 142 MMOL/L (ref 136–145)
T4 FREE SERPL-MCNC: 1.28 NG/DL (ref 0.71–1.51)
TRIGL SERPL-MCNC: 145 MG/DL (ref 30–150)
TRIGL SERPL-MCNC: 901 MG/DL (ref 30–150)
TSH SERPL DL<=0.005 MIU/L-ACNC: 0.3 UIU/ML (ref 0.4–4)
VT: 500
WBC # BLD AUTO: 9.48 K/UL (ref 3.9–12.7)

## 2023-05-24 PROCEDURE — 82550 ASSAY OF CK (CPK): CPT | Mod: 91 | Performed by: SURGERY

## 2023-05-24 PROCEDURE — 94003 VENT MGMT INPAT SUBQ DAY: CPT

## 2023-05-24 PROCEDURE — 84443 ASSAY THYROID STIM HORMONE: CPT | Performed by: NURSE PRACTITIONER

## 2023-05-24 PROCEDURE — 25000003 PHARM REV CODE 250

## 2023-05-24 PROCEDURE — 25000003 PHARM REV CODE 250: Performed by: SURGERY

## 2023-05-24 PROCEDURE — 25000003 PHARM REV CODE 250: Performed by: STUDENT IN AN ORGANIZED HEALTH CARE EDUCATION/TRAINING PROGRAM

## 2023-05-24 PROCEDURE — A4216 STERILE WATER/SALINE, 10 ML: HCPCS | Performed by: FAMILY MEDICINE

## 2023-05-24 PROCEDURE — 99900035 HC TECH TIME PER 15 MIN (STAT)

## 2023-05-24 PROCEDURE — 27000221 HC OXYGEN, UP TO 24 HOURS

## 2023-05-24 PROCEDURE — 20000000 HC ICU ROOM

## 2023-05-24 PROCEDURE — 80053 COMPREHEN METABOLIC PANEL: CPT

## 2023-05-24 PROCEDURE — 99900026 HC AIRWAY MAINTENANCE (STAT)

## 2023-05-24 PROCEDURE — 63600175 PHARM REV CODE 636 W HCPCS: Performed by: STUDENT IN AN ORGANIZED HEALTH CARE EDUCATION/TRAINING PROGRAM

## 2023-05-24 PROCEDURE — 83735 ASSAY OF MAGNESIUM: CPT

## 2023-05-24 PROCEDURE — 84100 ASSAY OF PHOSPHORUS: CPT

## 2023-05-24 PROCEDURE — 94761 N-INVAS EAR/PLS OXIMETRY MLT: CPT

## 2023-05-24 PROCEDURE — 81001 URINALYSIS AUTO W/SCOPE: CPT | Performed by: NURSE PRACTITIONER

## 2023-05-24 PROCEDURE — A4217 STERILE WATER/SALINE, 500 ML: HCPCS | Performed by: NURSE PRACTITIONER

## 2023-05-24 PROCEDURE — 99291 CRITICAL CARE FIRST HOUR: CPT | Mod: ,,, | Performed by: PSYCHIATRY & NEUROLOGY

## 2023-05-24 PROCEDURE — 84478 ASSAY OF TRIGLYCERIDES: CPT | Performed by: NURSE PRACTITIONER

## 2023-05-24 PROCEDURE — C9254 INJECTION, LACOSAMIDE: HCPCS | Performed by: SURGERY

## 2023-05-24 PROCEDURE — 99291 PR CRITICAL CARE, E/M 30-74 MINUTES: ICD-10-PCS | Mod: ,,, | Performed by: PSYCHIATRY & NEUROLOGY

## 2023-05-24 PROCEDURE — 27201109 HC SYSTEM FECAL MANAGEMENT

## 2023-05-24 PROCEDURE — 36415 COLL VENOUS BLD VENIPUNCTURE: CPT | Performed by: NURSE PRACTITIONER

## 2023-05-24 PROCEDURE — 83036 HEMOGLOBIN GLYCOSYLATED A1C: CPT | Performed by: NURSE PRACTITIONER

## 2023-05-24 PROCEDURE — 36600 WITHDRAWAL OF ARTERIAL BLOOD: CPT

## 2023-05-24 PROCEDURE — 63600175 PHARM REV CODE 636 W HCPCS: Performed by: NURSE PRACTITIONER

## 2023-05-24 PROCEDURE — 25000003 PHARM REV CODE 250: Performed by: NURSE PRACTITIONER

## 2023-05-24 PROCEDURE — 80061 LIPID PANEL: CPT | Performed by: NURSE PRACTITIONER

## 2023-05-24 PROCEDURE — 84439 ASSAY OF FREE THYROXINE: CPT | Performed by: NURSE PRACTITIONER

## 2023-05-24 PROCEDURE — 63600175 PHARM REV CODE 636 W HCPCS: Performed by: FAMILY MEDICINE

## 2023-05-24 PROCEDURE — 85025 COMPLETE CBC W/AUTO DIFF WBC: CPT

## 2023-05-24 PROCEDURE — 25000003 PHARM REV CODE 250: Performed by: FAMILY MEDICINE

## 2023-05-24 PROCEDURE — 82803 BLOOD GASES ANY COMBINATION: CPT

## 2023-05-24 PROCEDURE — 87086 URINE CULTURE/COLONY COUNT: CPT | Performed by: NURSE PRACTITIONER

## 2023-05-24 PROCEDURE — C9254 INJECTION, LACOSAMIDE: HCPCS | Performed by: NURSE PRACTITIONER

## 2023-05-24 PROCEDURE — 94002 VENT MGMT INPAT INIT DAY: CPT

## 2023-05-24 PROCEDURE — 27200966 HC CLOSED SUCTION SYSTEM

## 2023-05-24 PROCEDURE — 63600175 PHARM REV CODE 636 W HCPCS: Performed by: SURGERY

## 2023-05-24 PROCEDURE — 80048 BASIC METABOLIC PNL TOTAL CA: CPT | Mod: XB | Performed by: NURSE PRACTITIONER

## 2023-05-24 RX ORDER — SODIUM,POTASSIUM PHOSPHATES 280-250MG
2 POWDER IN PACKET (EA) ORAL
Status: DISCONTINUED | OUTPATIENT
Start: 2023-05-24 | End: 2023-05-24 | Stop reason: HOSPADM

## 2023-05-24 RX ORDER — SODIUM CHLORIDE, SODIUM LACTATE, POTASSIUM CHLORIDE, CALCIUM CHLORIDE 600; 310; 30; 20 MG/100ML; MG/100ML; MG/100ML; MG/100ML
INJECTION, SOLUTION INTRAVENOUS CONTINUOUS
Status: CANCELLED | OUTPATIENT
Start: 2023-05-24

## 2023-05-24 RX ORDER — SODIUM CHLORIDE 9 MG/ML
INJECTION, SOLUTION INTRAVENOUS
Status: CANCELLED | OUTPATIENT
Start: 2023-05-24

## 2023-05-24 RX ORDER — LABETALOL HCL 20 MG/4 ML
10 SYRINGE (ML) INTRAVENOUS EVERY 4 HOURS PRN
Status: DISCONTINUED | OUTPATIENT
Start: 2023-05-24 | End: 2023-05-29

## 2023-05-24 RX ORDER — POTASSIUM CHLORIDE 7.45 MG/ML
60 INJECTION INTRAVENOUS
Status: DISCONTINUED | OUTPATIENT
Start: 2023-05-24 | End: 2023-05-27

## 2023-05-24 RX ORDER — AMLODIPINE BESYLATE 10 MG/1
10 TABLET ORAL DAILY
COMMUNITY
Start: 2023-05-06

## 2023-05-24 RX ORDER — SODIUM CHLORIDE 0.9 % (FLUSH) 0.9 %
3 SYRINGE (ML) INJECTION
Status: CANCELLED | OUTPATIENT
Start: 2023-05-24

## 2023-05-24 RX ORDER — SODIUM CHLORIDE 0.9 % (FLUSH) 0.9 %
10 SYRINGE (ML) INJECTION
Status: CANCELLED | OUTPATIENT
Start: 2023-05-24

## 2023-05-24 RX ORDER — SODIUM CHLORIDE 0.9 % (FLUSH) 0.9 %
5 SYRINGE (ML) INJECTION
Status: CANCELLED | OUTPATIENT
Start: 2023-05-24

## 2023-05-24 RX ORDER — MUPIROCIN 20 MG/G
OINTMENT TOPICAL 2 TIMES DAILY
Status: CANCELLED | OUTPATIENT
Start: 2023-05-24 | End: 2023-05-27

## 2023-05-24 RX ORDER — CLOBAZAM 2.5 MG/ML
20 SUSPENSION ORAL 2 TIMES DAILY
Status: DISCONTINUED | OUTPATIENT
Start: 2023-05-24 | End: 2023-05-24

## 2023-05-24 RX ORDER — DEXTROSE 40 %
30 GEL (GRAM) ORAL
Status: CANCELLED | OUTPATIENT
Start: 2023-05-24

## 2023-05-24 RX ORDER — POLYETHYLENE GLYCOL 3350 17 G/17G
17 POWDER, FOR SOLUTION ORAL DAILY
Status: CANCELLED | OUTPATIENT
Start: 2023-05-25

## 2023-05-24 RX ORDER — HEPARIN SODIUM 5000 [USP'U]/ML
5000 INJECTION, SOLUTION INTRAVENOUS; SUBCUTANEOUS EVERY 8 HOURS
Status: DISCONTINUED | OUTPATIENT
Start: 2023-05-24 | End: 2023-05-31 | Stop reason: HOSPADM

## 2023-05-24 RX ORDER — FAMOTIDINE 20 MG/1
20 TABLET, FILM COATED ORAL 2 TIMES DAILY
Status: DISCONTINUED | OUTPATIENT
Start: 2023-05-24 | End: 2023-05-27

## 2023-05-24 RX ORDER — CALCIUM GLUCONATE 20 MG/ML
1 INJECTION, SOLUTION INTRAVENOUS
Status: DISCONTINUED | OUTPATIENT
Start: 2023-05-24 | End: 2023-05-27

## 2023-05-24 RX ORDER — SODIUM CHLORIDE, SODIUM LACTATE, POTASSIUM CHLORIDE, CALCIUM CHLORIDE 600; 310; 30; 20 MG/100ML; MG/100ML; MG/100ML; MG/100ML
INJECTION, SOLUTION INTRAVENOUS CONTINUOUS
Status: DISCONTINUED | OUTPATIENT
Start: 2023-05-24 | End: 2023-05-29

## 2023-05-24 RX ORDER — LEVETIRACETAM 500 MG/5ML
2000 INJECTION, SOLUTION, CONCENTRATE INTRAVENOUS EVERY 12 HOURS
Status: DISCONTINUED | OUTPATIENT
Start: 2023-05-24 | End: 2023-05-29

## 2023-05-24 RX ORDER — SODIUM,POTASSIUM PHOSPHATES 280-250MG
2 POWDER IN PACKET (EA) ORAL
Status: CANCELLED | OUTPATIENT
Start: 2023-05-24 | End: 2023-05-24

## 2023-05-24 RX ORDER — FAMOTIDINE 20 MG/1
20 TABLET, FILM COATED ORAL 2 TIMES DAILY
Status: CANCELLED | OUTPATIENT
Start: 2023-05-24

## 2023-05-24 RX ORDER — FAMOTIDINE 20 MG/1
20 TABLET, FILM COATED ORAL 2 TIMES DAILY
Status: DISCONTINUED | OUTPATIENT
Start: 2023-05-24 | End: 2023-05-24 | Stop reason: HOSPADM

## 2023-05-24 RX ORDER — ACETAMINOPHEN 325 MG/1
650 TABLET ORAL EVERY 6 HOURS PRN
Status: CANCELLED | OUTPATIENT
Start: 2023-05-24

## 2023-05-24 RX ORDER — MAGNESIUM SULFATE HEPTAHYDRATE 40 MG/ML
4 INJECTION, SOLUTION INTRAVENOUS
Status: DISCONTINUED | OUTPATIENT
Start: 2023-05-24 | End: 2023-05-27

## 2023-05-24 RX ORDER — ACETAMINOPHEN 325 MG/1
650 TABLET ORAL EVERY 6 HOURS PRN
Status: DISCONTINUED | OUTPATIENT
Start: 2023-05-24 | End: 2023-05-24

## 2023-05-24 RX ORDER — CLOBAZAM 10 MG/1
40 TABLET ORAL ONCE
Status: COMPLETED | OUTPATIENT
Start: 2023-05-24 | End: 2023-05-24

## 2023-05-24 RX ORDER — SODIUM CHLORIDE, SODIUM LACTATE, POTASSIUM CHLORIDE, CALCIUM CHLORIDE 600; 310; 30; 20 MG/100ML; MG/100ML; MG/100ML; MG/100ML
INJECTION, SOLUTION INTRAVENOUS CONTINUOUS
Status: DISCONTINUED | OUTPATIENT
Start: 2023-05-24 | End: 2023-05-24 | Stop reason: HOSPADM

## 2023-05-24 RX ORDER — ONDANSETRON 2 MG/ML
8 INJECTION INTRAMUSCULAR; INTRAVENOUS EVERY 6 HOURS PRN
Status: CANCELLED | OUTPATIENT
Start: 2023-05-24

## 2023-05-24 RX ORDER — CALCIUM GLUCONATE 20 MG/ML
3 INJECTION, SOLUTION INTRAVENOUS
Status: DISCONTINUED | OUTPATIENT
Start: 2023-05-24 | End: 2023-05-27

## 2023-05-24 RX ORDER — POTASSIUM CHLORIDE 7.45 MG/ML
10 INJECTION INTRAVENOUS ONCE
Status: DISCONTINUED | OUTPATIENT
Start: 2023-05-24 | End: 2023-05-24

## 2023-05-24 RX ORDER — POTASSIUM CHLORIDE 7.45 MG/ML
80 INJECTION INTRAVENOUS
Status: DISCONTINUED | OUTPATIENT
Start: 2023-05-24 | End: 2023-05-27

## 2023-05-24 RX ORDER — MAGNESIUM SULFATE HEPTAHYDRATE 40 MG/ML
2 INJECTION, SOLUTION INTRAVENOUS
Status: DISCONTINUED | OUTPATIENT
Start: 2023-05-24 | End: 2023-05-27

## 2023-05-24 RX ORDER — GLUCAGON 1 MG
1 KIT INJECTION
Status: CANCELLED | OUTPATIENT
Start: 2023-05-24

## 2023-05-24 RX ORDER — SODIUM,POTASSIUM PHOSPHATES 280-250MG
2 POWDER IN PACKET (EA) ORAL
Status: DISCONTINUED | OUTPATIENT
Start: 2023-05-24 | End: 2023-05-24

## 2023-05-24 RX ORDER — CLOBAZAM 10 MG/1
20 TABLET ORAL 2 TIMES DAILY
Status: CANCELLED | OUTPATIENT
Start: 2023-05-24

## 2023-05-24 RX ORDER — PROPOFOL 10 MG/ML
0-50 INJECTION, EMULSION INTRAVENOUS CONTINUOUS
Status: CANCELLED | OUTPATIENT
Start: 2023-05-24

## 2023-05-24 RX ORDER — NALOXONE HCL 0.4 MG/ML
0.02 VIAL (ML) INJECTION
Status: CANCELLED | OUTPATIENT
Start: 2023-05-24

## 2023-05-24 RX ORDER — POTASSIUM CHLORIDE 29.8 MG/ML
40 INJECTION INTRAVENOUS ONCE
Status: COMPLETED | OUTPATIENT
Start: 2023-05-24 | End: 2023-05-24

## 2023-05-24 RX ORDER — CLOBAZAM 2.5 MG/ML
40 SUSPENSION ORAL ONCE
Status: DISCONTINUED | OUTPATIENT
Start: 2023-05-24 | End: 2023-05-24

## 2023-05-24 RX ORDER — CLOBAZAM 10 MG/1
20 TABLET ORAL 2 TIMES DAILY
Status: DISCONTINUED | OUTPATIENT
Start: 2023-05-24 | End: 2023-05-24 | Stop reason: HOSPADM

## 2023-05-24 RX ORDER — LORAZEPAM 2 MG/ML
2 INJECTION INTRAMUSCULAR EVERY 10 MIN PRN
Status: CANCELLED | OUTPATIENT
Start: 2023-05-24

## 2023-05-24 RX ORDER — FENTANYL CITRATE 50 UG/ML
50 INJECTION, SOLUTION INTRAMUSCULAR; INTRAVENOUS
Status: CANCELLED | OUTPATIENT
Start: 2023-05-24

## 2023-05-24 RX ORDER — POTASSIUM CHLORIDE 7.45 MG/ML
40 INJECTION INTRAVENOUS
Status: DISCONTINUED | OUTPATIENT
Start: 2023-05-24 | End: 2023-05-27

## 2023-05-24 RX ORDER — ACETAMINOPHEN 325 MG/1
650 TABLET ORAL EVERY 6 HOURS PRN
Status: DISCONTINUED | OUTPATIENT
Start: 2023-05-24 | End: 2023-05-27

## 2023-05-24 RX ORDER — MIDAZOLAM HYDROCHLORIDE 1 MG/ML
5 INJECTION, SOLUTION INTRAVENOUS CONTINUOUS
Status: DISCONTINUED | OUTPATIENT
Start: 2023-05-24 | End: 2023-05-25

## 2023-05-24 RX ORDER — ENOXAPARIN SODIUM 100 MG/ML
40 INJECTION SUBCUTANEOUS EVERY 24 HOURS
Status: CANCELLED | OUTPATIENT
Start: 2023-05-24

## 2023-05-24 RX ORDER — DEXTROSE 40 %
15 GEL (GRAM) ORAL
Status: CANCELLED | OUTPATIENT
Start: 2023-05-24

## 2023-05-24 RX ORDER — CLOBAZAM 2.5 MG/ML
20 SUSPENSION ORAL DAILY
Status: DISCONTINUED | OUTPATIENT
Start: 2023-05-25 | End: 2023-05-26

## 2023-05-24 RX ORDER — SODIUM CHLORIDE 0.9 % (FLUSH) 0.9 %
10 SYRINGE (ML) INJECTION
Status: DISCONTINUED | OUTPATIENT
Start: 2023-05-24 | End: 2023-05-31 | Stop reason: HOSPADM

## 2023-05-24 RX ORDER — POTASSIUM CHLORIDE 7.45 MG/ML
10 INJECTION INTRAVENOUS
Status: DISCONTINUED | OUTPATIENT
Start: 2023-05-24 | End: 2023-05-24

## 2023-05-24 RX ORDER — SODIUM CHLORIDE 0.9 % (FLUSH) 0.9 %
10 SYRINGE (ML) INJECTION EVERY 6 HOURS
Status: CANCELLED | OUTPATIENT
Start: 2023-05-24

## 2023-05-24 RX ORDER — MAGNESIUM SULFATE HEPTAHYDRATE 40 MG/ML
2 INJECTION, SOLUTION INTRAVENOUS ONCE
Status: COMPLETED | OUTPATIENT
Start: 2023-05-24 | End: 2023-05-24

## 2023-05-24 RX ORDER — ACETAMINOPHEN 325 MG/1
650 TABLET ORAL EVERY 6 HOURS PRN
Status: DISCONTINUED | OUTPATIENT
Start: 2023-05-24 | End: 2023-05-24 | Stop reason: HOSPADM

## 2023-05-24 RX ORDER — LEVETIRACETAM 500 MG/5ML
2000 INJECTION, SOLUTION, CONCENTRATE INTRAVENOUS EVERY 12 HOURS
Status: CANCELLED | OUTPATIENT
Start: 2023-05-24

## 2023-05-24 RX ORDER — POLYETHYLENE GLYCOL 3350 17 G/17G
17 POWDER, FOR SOLUTION ORAL DAILY
Status: DISCONTINUED | OUTPATIENT
Start: 2023-05-24 | End: 2023-05-24 | Stop reason: HOSPADM

## 2023-05-24 RX ORDER — CALCIUM GLUCONATE 20 MG/ML
2 INJECTION, SOLUTION INTRAVENOUS
Status: DISCONTINUED | OUTPATIENT
Start: 2023-05-24 | End: 2023-05-27

## 2023-05-24 RX ADMIN — PROPOFOL 50 MCG/KG/MIN: 10 INJECTION, EMULSION INTRAVENOUS at 05:05

## 2023-05-24 RX ADMIN — CLOBAZAM 40 MG: 10 TABLET ORAL at 10:05

## 2023-05-24 RX ADMIN — FAMOTIDINE 20 MG: 20 TABLET ORAL at 09:05

## 2023-05-24 RX ADMIN — SODIUM CHLORIDE, POTASSIUM CHLORIDE, SODIUM LACTATE AND CALCIUM CHLORIDE: 600; 310; 30; 20 INJECTION, SOLUTION INTRAVENOUS at 12:05

## 2023-05-24 RX ADMIN — FAMOTIDINE 20 MG: 20 TABLET, FILM COATED ORAL at 10:05

## 2023-05-24 RX ADMIN — MAGNESIUM SULFATE 2 G: 2 INJECTION INTRAVENOUS at 08:05

## 2023-05-24 RX ADMIN — LEVETIRACETAM 2000 MG: 100 INJECTION, SOLUTION INTRAVENOUS at 08:05

## 2023-05-24 RX ADMIN — SODIUM CHLORIDE, POTASSIUM CHLORIDE, SODIUM LACTATE AND CALCIUM CHLORIDE: 600; 310; 30; 20 INJECTION, SOLUTION INTRAVENOUS at 11:05

## 2023-05-24 RX ADMIN — POTASSIUM CHLORIDE 10 MEQ: 7.46 INJECTION, SOLUTION INTRAVENOUS at 06:05

## 2023-05-24 RX ADMIN — VASOPRESSIN 250 ML: 20 INJECTION, SOLUTION INTRAVENOUS at 07:05

## 2023-05-24 RX ADMIN — LACOSAMIDE 200 MG: 10 INJECTION, SOLUTION INTRAVENOUS at 05:05

## 2023-05-24 RX ADMIN — PROPOFOL 50 MCG/KG/MIN: 10 INJECTION, EMULSION INTRAVENOUS at 04:05

## 2023-05-24 RX ADMIN — POTASSIUM & SODIUM PHOSPHATES POWDER PACK 280-160-250 MG 2 PACKET: 280-160-250 PACK at 08:05

## 2023-05-24 RX ADMIN — POTASSIUM & SODIUM PHOSPHATES POWDER PACK 280-160-250 MG 2 PACKET: 280-160-250 PACK at 11:05

## 2023-05-24 RX ADMIN — SODIUM CHLORIDE, POTASSIUM CHLORIDE, SODIUM LACTATE AND CALCIUM CHLORIDE: 600; 310; 30; 20 INJECTION, SOLUTION INTRAVENOUS at 06:05

## 2023-05-24 RX ADMIN — SODIUM CHLORIDE, PRESERVATIVE FREE 10 ML: 5 INJECTION INTRAVENOUS at 12:05

## 2023-05-24 RX ADMIN — SODIUM CHLORIDE, POTASSIUM CHLORIDE, SODIUM LACTATE AND CALCIUM CHLORIDE: 600; 310; 30; 20 INJECTION, SOLUTION INTRAVENOUS at 04:05

## 2023-05-24 RX ADMIN — MIDAZOLAM HYDROCHLORIDE 5 MG/HR: 5 INJECTION, SOLUTION INTRAMUSCULAR; INTRAVENOUS at 07:05

## 2023-05-24 RX ADMIN — POTASSIUM CHLORIDE 40 MEQ: 400 INJECTION, SOLUTION INTRAVENOUS at 08:05

## 2023-05-24 RX ADMIN — LACOSAMIDE 200 MG: 10 INJECTION INTRAVENOUS at 05:05

## 2023-05-24 RX ADMIN — SODIUM CHLORIDE, POTASSIUM CHLORIDE, SODIUM LACTATE AND CALCIUM CHLORIDE: 600; 310; 30; 20 INJECTION, SOLUTION INTRAVENOUS at 09:05

## 2023-05-24 RX ADMIN — SODIUM CHLORIDE, PRESERVATIVE FREE 10 ML: 5 INJECTION INTRAVENOUS at 06:05

## 2023-05-24 RX ADMIN — POTASSIUM & SODIUM PHOSPHATES POWDER PACK 280-160-250 MG 2 PACKET: 280-160-250 PACK at 06:05

## 2023-05-24 RX ADMIN — SODIUM CHLORIDE, POTASSIUM CHLORIDE, SODIUM LACTATE AND CALCIUM CHLORIDE: 600; 310; 30; 20 INJECTION, SOLUTION INTRAVENOUS at 05:05

## 2023-05-24 RX ADMIN — PROPOFOL 50 MCG/KG/MIN: 10 INJECTION, EMULSION INTRAVENOUS at 01:05

## 2023-05-24 RX ADMIN — POLYETHYLENE GLYCOL (3350) 17 G: 17 POWDER, FOR SOLUTION ORAL at 12:05

## 2023-05-24 RX ADMIN — MUPIROCIN: 20 OINTMENT TOPICAL at 08:05

## 2023-05-24 RX ADMIN — MIDAZOLAM HYDROCHLORIDE 5 MG/HR: 1 INJECTION, SOLUTION INTRAVENOUS at 05:05

## 2023-05-24 RX ADMIN — PROPOFOL 50 MCG/KG/MIN: 10 INJECTION, EMULSION INTRAVENOUS at 09:05

## 2023-05-24 NOTE — PROGRESS NOTES
Progress Note  [facility]    Resident: Arabella Becerra  Admit Date: 5/22/2023  Today's Date: 05/24/2023      SUBJECTIVE:       Chief Complaint   Patient presents with    Seizures     Pt presents to ED today via [facility] witnessed seizure  Friend at home unaware of pt's info or medical hx   Pt received versed 10 mg IM en route         HPI:  39 yo M w/ PMHx of SDH/TBI s/p craniectomy 12/2020, Bipolar disorder, HTN, and known history of seizure presented to [facility] ED on 5/22/23 without identifiable information after witnessed seizure. Was naked on presentation and incontinent of urine. Pt could not be identifed on presentation, was brought in by friend who also did not provide any information. Was given 10 mg Versed IM via EMS. Was obtunded and minimally responsive to painful stimuli.     Pt later was identified with [place] at [facility] [place]. Last seen by [facility] in April 2023 and was switched to Vimpat from [facility]. Had previous [facility] hospitalization for seizures 2/2 to running out of Keppra.      When seen in ED, pt was still obtunded with sluggish pupils. Responded to noxious stimuli. Only moving Right side of body, unclear if that is is his baseline. GCS 11 - E3, V2, M6. Following verbal commands.     In ED, initial vitals were /72, , RR 40, temp 100.8 F (38.2) and SpO2 92%. Labs: WBC 18, glucose 107. Troponin 0.141, lactate 9.7, , procal negative. TSH 1.382. UA negative nitrite, >100 RBC, 28 WBC. Flu and covid negative. Blood cultures in process. EKG: No STEMI, sinus tachycardia. CTH: no acute findings. Extensive Right hemispheric encephalomalacia. Pt admitted to [facility] for seizures and AMS, requiring ICU level care.    Interval hx:  Pt intubated and sedated yesterday in preparation for transfer to Neuro ICU. Had breakthrough seizure overnight despite sedation on Propofol and Versed, requiring Ativan push. EEG cap restarted this morning, discussed with Epileptologist at [facility] [place]  [facility]. Spot EEG unable to be obtained as technician has limited availability [facility]. Low UOP overnight <100 mL/hr. Increased IV LR to 400 mL/hr.      Review of patient's allergies indicates:  No Known Allergies    Past Medical History:   Diagnosis Date    Hypertension     Seizures      History reviewed. No pertinent surgical history.  History reviewed. No pertinent family history.       All medications reviewed.    Review of Systems   Unable to perform ROS: Intubated     OBJECTIVE:     Vital Signs Trends/Hx Reviewed  Vitals:    05/24/23 0430 05/24/23 0445 05/24/23 0527 05/24/23 0712   BP: (!) 111/57 (!) 106/59     BP Location:       Patient Position:       Pulse: 80 80 73 68   Resp: 20 20 20 20   Temp: 97.2 °F (36.2 °C) 97.2 °F (36.2 °C) (!) 95.2 °F (35.1 °C)    TempSrc:       SpO2: 100% 100% 100% 100%   Weight:       Height:           Ventilator settings:     Vent Mode: A/CMV-VC  Oxygen Concentration (%):  [] 21  Resp Rate Total:  [20 br/min-26 br/min] 20 br/min  Vt Set:  [400 mL-500 mL] 500 mL  PEEP/CPAP:  [5 cmH20] 5 cmH20  Mean Airway Pressure:  [9 cmH20-9.9 cmH20] 9.7 cmH20       Physical Exam:  General: Intubated and sedated  HEENT: NC, sluggish pupils, previous craniectomy/cranioplasty  Cardiac: normal rate, regular rhythm  Respiratory: Auscultation clear bilaterally. No increased work of breathing noted. No rhonchi or wheezes.  Abdomen: Soft, NT/ND.  Extremities: No edema.   Neuro: Unarousable, RASS -5      Laboratory:  No results for input(s): PH, PCO2, PO2, HCO3, POCSATURATED, BE in the last 24 hours.  Recent Labs   Lab 05/24/23  0324   WBC 9.48   RBC 3.70*   HGB 10.8*   HCT 32.6*      MCV 88   MCH 29.2   MCHC 33.1     Recent Labs   Lab 05/24/23  0324      K 3.1*      CO2 26   BUN 11   CREATININE 1.2   MG 1.5*       Microbiology Data:   Microbiology Results (last 7 days)       Procedure Component Value Units Date/Time    Blood Culture #2 **CANNOT BE ORDERED STAT**  [[id number]] Collected: 23 0813    Order Status: Completed Specimen: Blood from Peripheral, Forearm, Left Updated: 23 1212     Blood Culture, Routine No Growth to date      No Growth to date    Blood Culture #1 **CANNOT BE ORDERED STAT** [[id number]] Collected: 23 0810    Order Status: Completed Specimen: Blood from Peripheral, Hand, Right Updated: 23 1212     Blood Culture, Routine No Growth to date      No Growth to date    CSF culture and Gram Stain (Tube 2) [[id number]] Collected: 23 1054    Order Status: Completed Specimen: CSF (Spinal Fluid) from CSF Tap, Tube 2 Updated: 23     CSF CULTURE No Growth to date     Gram Stain Result No WBC's      No organisms seen    Narrative:      On which sequentially labeled tube should this analysis be  performed?->2             Chest Imaging:   No new imaging.     Infusions:     fentanyl 37.5 mcg/hr (23)    lactated ringers 350 mL/hr at 23    midazolam 5 mg/hr (23)    propofoL 50 mcg/kg/min (23)       Scheduled Medications:    enoxparin  40 mg Subcutaneous Q24H (prophylaxis, 1700)    lacosamide (VIMPAT) IVPB  200 mg Intravenous Q12H    levetiracetam IV  2,000 mg Intravenous Q12H    magnesium sulfate IVPB  2 g Intravenous Once    mupirocin   Nasal BID    potassium chloride  10 mEq Intravenous Q1H    potassium, sodium phosphates  2 packet Per OG tube QID (AC & HS)    sodium chloride 0.9%  10 mL Intravenous Q6H       PRN Medications:   sodium chloride 0.9%, acetaminophen, dextrose 10%, dextrose 10%, dextrose, dextrose, [] fentaNYL **FOLLOWED BY** fentaNYL, glucagon (human recombinant), lorazepam, naloxone, ondansetron, senna-docusate 8.6-50 mg, Flushing PICC Protocol **AND** sodium chloride 0.9% **AND** sodium chloride 0.9%, sodium chloride 0.9%, sodium chloride 0.9%    Assessment & Plan:   Patient Active Problem List   Diagnosis    Seizure    Altered mental status    HTN (hypertension)     Troponin level elevated    ALONDRA (acute kidney injury)       ASSESSMENT & RECOMMENDATIONS     39 yo M w/ PMHx of SDH/TBI s/p craniectomy 12/2020, Bipolar disorder, HTN, and known history of seizure currently in ICU for status epilepticus. Continues to have breakthrough seizures overnight despite being on 2 AEDs (Vimpat and Keppra) and sedation on Propofol and Versed. Ongoing status epilepticus.       Neuro/Psych    #Intubated  Patient is not alert nor oriented  Sedated with Fentanyl 37.5 mg/hr; will discontinue  Midazolam 5 mg/hr  Propofol 50 mcg/kg/min      #Seizures  Witnessed seizure, Urinary incontinence on presentation  S/p 10 mg Versed IM via EMS   --> 6946 --> 61113 --> 34257 --> 77097  Lactate 9.7 --> 2.4  -  Team has been consulting with Epileptologist at [facility] who recommended starting clobazam 40 mg x1 followed by 20 mg BID in addition to his current Keppra and Vimpat. Onfi preferred over topiramate as it does not cause metabolic acidosis. Another option is to increase Versed and/or Propofol.    - Continue Vimpat 200 mg BID  - IV Keppra load 3g given  - Continue maintenance Keppra 2g BID  - Remain strictly NPO for now  - Neuro following, appreciate recs  - Pt accepted to Neuro ICU for transfer, remain intubated and sedated  - Neuro checks  - Lorazepam PRN  - Seizure precautions  - EEG        #Altered mental status 2/2 to postictal state     Recent Labs     05/22/23  1156   PH 7.360   PCO2 37.7   PO2 92   HCO3 21.3*   POCSATURATED 97   BE -4       See Seizure and ID for further info        CV     #Elevated troponin - RESOLVED  Trop 0.141   - Downtrended     #HTN  - Holding home lisinopril 5 mg as hypotensive; will restart when appropriate        Pulm  Intubated and sedated  Satting well on FiO2 21%    Vent Mode: A/CMV-VC  Oxygen Concentration (%):  [] 21  Resp Rate Total:  [20 br/min-26 br/min] 20 br/min  Vt Set:  [400 mL-500 mL] 500 mL  PEEP/CPAP:  [5 cmH20] 5 cmH20  Mean Airway  Pressure:  [9 cmH20-9.9 cmH20] 9.7 cmH20       Recent Labs     05/24/23  0947   PH 7.567*   PCO2 29.8*   PO2 86   HCO3 27.1   POCSATURATED 98   BE 5     Adjusting vent settings PRN        FEN/GI  Diet: NPO  Keep Mg 2, K 4  Zofran prn for n/v   K 3.1, Ca 8.4, Phos 1.7, Mg 1.5  Replete electrolytes PRN  Recheck BMP   Start tube feeds      RENAL    I/O last 3 completed shifts:  In: 81547.8 [I.V.:8639.3; IV Piggyback:2174.4]  Out: 8260 [Urine:8060; Emesis/NG output:200]  No intake/output data recorded.       Intake/Output Summary (Last 24 hours) at 5/24/2023 0738  Last data filed at 5/24/2023 0642  Gross per 24 hour   Intake 6801.47 ml   Output 3840 ml   Net 2961.47 ml         Strict I&Os  Avoid renal toxic meds  BUN/Cr: 11/1.2, stable  Continue to monitor renal status and urine output     #ALONDRA (acute kidney injury) - RESOLVED  Initial BUN/Cr 24/2.3 on admission  - Maintain  mL/hr  - Hold nephrotoxic medications  - Bladder scan if suspicion of retention, followed by retroperitoneal US to evaluate for Hydronephrosis. Garcia if retention.   - Avoid Hypotension  - Strict I/O's  - Monitor renal function     #Rhabdomyolysis   --> 6946 --> 12056 --> 08763 --> 56010  1L LR bolus followed by IV LR for 10 hours  IV  mL/hr on 5/24/23  - Trend CK q6h; aiming for CK <5000  - Titrate IVF to goal UOP of 200-300 mL/hr  - Continue IV LR        Heme  H/H 13.2/39.2 --> 10.8/32.6  WBC 20.64 likely 2/2 seizures --> 9.48  DVT prophylaxis: Lovenox     Endo  No known issues at this time.  Maintain glucose 140-180        ID  No known issues at this time.   Blood Cx NGTD x2  LP with Glucose 78 (H), protein 58 (H), gram stain CSF: No WBC or organisms seen  UA negative leuks, negative nitrites, 3+ occult blood, >100 RBC, 28 WBC, few bacteria, 8 hyaline casts  Discontinued Vanc and Rocephin 5/23/23        Feeding: Start Tube Feeds, OGT  Analgesia: Tylenol PRN  Sedation: Versed, Propofol, will stop Fentanyl  Thrombo PPX:  Lovenox  Head of Bed: >30 degrees  Ulcer PPX: Famotidine  Glucose: goal 140-180  SBT/SAT: No  Bowel Regimen: None  Indwelling Lines: PICC  Abx: None        Code Status: Full          Dispo  Pt intubated and sedated in ICU. Added Onfi to current Keppra and Vimpat per Epileptologist recs. Starting tube feeds. Team attempting to expedite transfer to Neuro ICU for Status Epilepticus.           Arabella Becerra MD, MPH  [facility] PGY-1  05/24/2023

## 2023-05-24 NOTE — PLAN OF CARE
Problem: Adult Inpatient Plan of Care  Goal: Plan of Care Review  Outcome: Ongoing, Progressing  Goal: Patient-Specific Goal (Individualized)  Outcome: Ongoing, Progressing  Goal: Absence of Hospital-Acquired Illness or Injury  Outcome: Ongoing, Progressing  Goal: Optimal Comfort and Wellbeing  Outcome: Ongoing, Progressing  Goal: Readiness for Transition of Care  Outcome: Ongoing, Progressing     Problem: Communication Impairment (Mechanical Ventilation, Invasive)  Goal: Effective Communication  Outcome: Ongoing, Progressing     Problem: Device-Related Complication Risk (Mechanical Ventilation, Invasive)  Goal: Optimal Device Function  Outcome: Ongoing, Progressing     Problem: Inability to Wean (Mechanical Ventilation, Invasive)  Goal: Mechanical Ventilation Liberation  Outcome: Ongoing, Progressing     Problem: Nutrition Impairment (Mechanical Ventilation, Invasive)  Goal: Optimal Nutrition Delivery  Outcome: Ongoing, Progressing

## 2023-05-24 NOTE — NURSING
Called On Call MD to notify of patient still having UO<200 per hour.    Order received to increase IV fluids to 400ml/h.    Also notified MD of Phosphorus of 1.7, K of 3.1, Mag of 1.5.    Awaiting new order.

## 2023-05-24 NOTE — NURSING
Notified On Call MD, Dr. Lester, that patient just had a seizure-like episode consisting of twitches/ slow jerky movements that started on the face, then gradually followed to the rest of the body. Seizure lasted few minutes.    2 mg ativan given.    Will continue to monitor.

## 2023-05-24 NOTE — H&P
[facility] - Cardiac Medical ICU  Neurocritical Care  History & Physical    Admit Date: 2023  Service Date: 2023  Length of Stay: 0    Subjective:     Chief Complaint: Status epilepticus    History of Present Illness:  37 yo M w/ PMHx of SDH/TBI s/p craniectomy 2020, Bipolar disorder, HTN, and known history of seizure presented to [facility] ED on 23 without identifiable information after witnessed seizure. Was naked on presentation and incontinent of urine. Pt could not be identifed on presentation, was brought in by friend who also did not provide any information. Was given 10 mg Versed IM via EMS. Was obtunded and minimally responsive to painful stimuli.Pt intubated and sedated 23 in preparation for transfer to Neuro ICU. Had breakthrough seizure overnight despite sedation on Propofol and Versed, requiring Ativan push. EEG cap restarted this morning, discussed with Epileptologist at [facility] [place]. Spot EEG unable to be obtained as technician has limited availability at [facility]. Admitted to [facility] for continuous EEG and neuro monitoring.       Past Medical History:   Diagnosis Date    Hypertension     Seizures      No past surgical history on file.   Current Facility-Administered Medications on File Prior to Encounter   Medication Dose Route Frequency Provider Last Rate Last Admin    [COMPLETED] cloBAZam Tab 40 mg  40 mg Per OG tube Once Andi Lou III, MD   40 mg at 23 1028    [] lactated ringers infusion   Intravenous Continuous Brady Rubin DO   Stopped at 23 1233    [COMPLETED] magnesium sulfate 2g in water 50mL IVPB (premix)  2 g Intravenous Once Ajay Lester MD   Stopped at 23 1024    [COMPLETED] potassium chloride 40 mEq in 100 mL IVPB (FOR CENTRAL LINE ADMINISTRATION ONLY)  40 mEq Intravenous Once Andi Lou III, MD   Stopped at 23 1216    [] succinylcholine (ANECTINE) 20 mg/mL injection             [DISCONTINUED] 0.9%   NaCl infusion   Intravenous PRN Andi Lou III, MD   Stopped at 05/24/23 0637    [DISCONTINUED] acetaminophen tablet 650 mg  650 mg Oral Q6H PRN Brady Rubin DO   650 mg at 05/22/23 2343    [DISCONTINUED] acetaminophen tablet 650 mg  650 mg Per OG tube Q6H PRN Brady Rubin DO        [DISCONTINUED] cloBAZam suspension 20 mg  20 mg Per OG tube BID Brady Rubin DO        [DISCONTINUED] cloBAZam suspension 40 mg  40 mg Per OG tube Once Brady Rubin DO        [DISCONTINUED] cloBAZam Tab 20 mg  20 mg Per OG tube BID Andi Lou III, MD        [DISCONTINUED] dextrose 10% bolus 125 mL 125 mL  12.5 g Intravenous PRN Arabella Becerra MD        [DISCONTINUED] dextrose 10% bolus 250 mL 250 mL  25 g Intravenous PRN Arabella Becerra MD        [DISCONTINUED] dextrose 40 % gel 15,000 mg  15 g Oral PRN Arabella Becerra MD        [DISCONTINUED] dextrose 40 % gel 30,000 mg  30 g Oral PRN Arabella Becerra MD        [DISCONTINUED] enoxaparin injection 40 mg  40 mg Subcutaneous Q24H (prophylaxis, 1700) Arabella Becerra MD   40 mg at 05/23/23 1732    [DISCONTINUED] famotidine tablet 20 mg  20 mg Per OG tube BID Arabella Becerra MD   20 mg at 05/24/23 1029    [DISCONTINUED] fentaNYL 2500 mcg in 0.9% sodium chloride 250 mL infusion premix (titrating)  0-200 mcg/hr Intravenous Continuous Justin Ellerman, MD   Stopped at 05/24/23 1222    [DISCONTINUED] fentaNYL 50 mcg/mL injection 50 mcg  50 mcg Intravenous Q1H PRN Justin Ellerman, MD        [DISCONTINUED] glucagon (human recombinant) injection 1 mg  1 mg Intramuscular PRN Arabella Becerra MD        [DISCONTINUED] lacosamide (VIMPAT) 200 mg in sodium chloride 0.9% 100 mL IVPB  200 mg Intravenous Q12H Justin Ellerman, MD   Stopped at 05/24/23 0546    [DISCONTINUED] lactated ringers infusion   Intravenous Continuous Brady Rubin  mL/hr at 05/24/23 1300 Rate Verify at 05/24/23 1300    [DISCONTINUED] levETIRAcetam injection 2,000 mg  2,000 mg Intravenous  Q12H Brady Rubin, DO   2,000 mg at 05/24/23 0813    [DISCONTINUED] LORazepam injection 2 mg  2 mg Intravenous Q10 Min PRN Ajay Lester MD   2 mg at 05/23/23 2202    [DISCONTINUED] midazolam 100 mg/100 mL in dextrose 5% infusion  0-5 mg/hr Intravenous Continuous Brady Rubin, DO 5 mL/hr at 05/24/23 1300 5 mg/hr at 05/24/23 1300    [DISCONTINUED] mupirocin 2 % ointment   Nasal BID Andi Lou III, MD   Given at 05/24/23 0813    [DISCONTINUED] naloxone 0.4 mg/mL injection 0.02 mg  0.02 mg Intravenous PRN Arabella Becerra MD        [DISCONTINUED] ondansetron injection 8 mg  8 mg Intravenous Q6H PRN Brady Rubin DO   8 mg at 05/22/23 2319    [DISCONTINUED] polyethylene glycol packet 17 g  17 g Per NG tube Daily Brady Rubin, DO   17 g at 05/24/23 1240    [DISCONTINUED] potassium chloride 10 mEq in 100 mL IVPB  10 mEq Intravenous Q1H Ajay Lester MD   Stopped at 05/24/23 0737    [DISCONTINUED] potassium chloride 10 mEq in 100 mL IVPB  10 mEq Intravenous Once Andi Lou III, MD        [DISCONTINUED] potassium, sodium phosphates 280-160-250 mg packet 2 packet  2 packet Per OG tube QID (AC & HS) Ajay Lester MD   2 packet at 05/24/23 0633    [DISCONTINUED] potassium, sodium phosphates 280-160-250 mg packet 2 packet  2 packet Per OG tube Q3H Andi Lou III, MD   2 packet at 05/24/23 1137    [DISCONTINUED] propofol (DIPRIVAN) 10 mg/mL infusion  0-50 mcg/kg/min (Dosing Weight) Intravenous Continuous Justin Ellerman, MD 20.6 mL/hr at 05/24/23 1330 50 mcg/kg/min at 05/24/23 1330    [DISCONTINUED] senna-docusate 8.6-50 mg per tablet 1 tablet  1 tablet Oral BID PRN Arabella Becerra MD        [DISCONTINUED] sodium chloride 0.9% flush 10 mL  10 mL Intravenous Q6H Andi Lou III, MD   10 mL at 05/24/23 1200    [DISCONTINUED] sodium chloride 0.9% flush 10 mL  10 mL Intravenous PRN Andi Lou III, MD        [DISCONTINUED] sodium chloride 0.9% flush 3 mL  3 mL Intravenous PRN  Arabella Becerra MD        [DISCONTINUED] sodium chloride 0.9% flush 5 mL  5 mL Intravenous PRN Aarbella Becerra MD        [DISCONTINUED] succinylcholine (ANECTINE) 20 mg/mL injection              Current Outpatient Medications on File Prior to Encounter   Medication Sig Dispense Refill    lacosamide (VIMPAT) 50 mg Tab Take 100 mg by mouth 2 (two) times a day.      lisinopriL (PRINIVIL,ZESTRIL) 5 MG tablet Take 5 mg by mouth once daily.        Allergies: Patient has no known allergies.    No family history on file.     Review of Systems   Reason unable to perform ROS: decrease LOC.   Objective:     Vitals:    Temp: 98.3 °F (36.8 °C)  Pulse: 75  Rhythm: normal sinus rhythm  BP: (!) 181/98  MAP (mmHg): 134  Resp: 16  SpO2: 100 %  Oxygen Concentration (%): 21  Vent Mode: A/C  Set Rate: 16 BPM  Vt Set: 500 mL  PEEP/CPAP: 8 cmH20  Peak Airway Pressure: 24 cmH20  Mean Airway Pressure: 12 cmH20    Temp  Min: 95.2 °F (35.1 °C)  Max: 99 °F (37.2 °C)  Pulse  Min: 67  Max: 101  BP  Min: 100/62  Max: 181/98  MAP (mmHg)  Min: 73  Max: 134  Resp  Min: 16  Max: 26  SpO2  Min: 100 %  Max: 100 %  Oxygen Concentration (%)  Min: 21  Max: 30    No intake/output data recorded.   Unmeasured Output  Stool Occurrence: 1        Physical Exam  Constitutional:       Appearance: He is ill-appearing.   HENT:      Head: Normocephalic.      Nose: Nose normal.      Mouth/Throat:      Mouth: Mucous membranes are moist.      Pharynx: Oropharynx is clear.   Eyes:      Pupils: Pupils are equal, round, and reactive to light.   Cardiovascular:      Rate and Rhythm: Normal rate and regular rhythm.      Pulses: Normal pulses.      Heart sounds: Normal heart sounds.   Pulmonary:      Effort: Pulmonary effort is normal.      Breath sounds: Normal breath sounds.   Abdominal:      General: Bowel sounds are normal.      Palpations: Abdomen is soft.   Musculoskeletal:         General: Normal range of motion.   Skin:     General: Skin is warm and dry.      Capillary  Refill: Capillary refill takes 2 to 3 seconds.   Neurological:      Comments: GCS T3  PERRL  WD X4  Sensation Intact      Unable to test orientation, language, memory, judgment, insight, fund of knowledge, hearing, shoulder shrug, tongue protrusion, coordination, gait due to level of consciousness.       Today I personally reviewed pertinent medications, lines/drains/airways, imaging, cardiology results, laboratory results, microbiology results, notably:          Assessment/Plan:     Neuro  * Status epilepticus  - admit to [facility] r/o status  - continuous EEG  - Non-titrating propofol @ 50  - Non-titrating versed @ 5  - resume Vimpat 200 mg BID  - continue Onfi 20 mg daily  - Vimpat level 2.0      Seizure  Hx of      Pulmonary  On mechanically assisted ventilation  - CXR daily  - ABG daily    Cardiac/Vascular  HTN (hypertension)  Hx of    Orthopedic  Non-traumatic rhabdomyolysis  - Ck trending down  - daily Ck  -  ml/hr            The patient is being Prophylaxed for:  Venous Thromboembolism with: Mechanical or Chemical  Stress Ulcer with: H2B  Ventilator Pneumonia with: chlorhexidine oral care    Activity Orders          Turn patient starting at 05/24 1600    Elevate HOB starting at 05/24 1519    Diet NPO: NPO starting at 05/24 1519        Full Code  Critical care time 45 mins  Kayleigh Zelaya NP  Neurocritical Care  [facility] - Cardiac Medical ICU

## 2023-05-24 NOTE — PROCEDURES
[facility] EEG/VIDEO MONITORING REPORT  Yeison Andrade  [id number]  1985    DATE OF SERVICE: 5/22/23-5/23/23  DATE OF ADMISSION: 5/22/2023  7:30 AM    ADMITTING/REQUESTING PROVIDER: Andi Lou III, MD    REASON FOR CONSULT: 38 year old male with history of prior SDH presenting with status epilepticus.     METHODOLOGY   Electroencephalographic (EEG) recording is with electrodes placed according to the International 10-20 placement system.  Thirty two (32) channels of digital signal (sampling rate of 512/sec) including T1 and T2 was simultaneously recorded from the scalp and may include  EKG, EMG, and/or eye monitors.  Recording band pass was 0.1 to 512 hz.  Digital video recording of the patient is simultaneously recorded with the EEG.  The patient is instructed report clinical symptoms which may occur during the recording session.  EEG and video recording is stored and archived in digital format.  Activation procedures which include photic stimulation, hyperventilation and instructing patients to perform simple task are done in selected patients.   The EEG is displayed on a monitor screen and can be reviewed using different montages.  Computer assisted analysis is employed to detect spike and electrographic seizure activity.   The entire record is submitted for computer analysis.  The entire recording is visually reviewed and the times identified by computer analysis as being spikes or seizures are reviewed again.  Compresses spectral analysis (CSA) is also performed on the activity recorded from each individual channel.  This is displayed as a power display of frequencies from 0 to 30 Hz over time.   The CSA is reviewed looking for asymmetries in power between homologous areas of the scalp and then compared with the original EEG recording.     Melophone software is also utilized in the review of this study.  This software suite analyzes the EEG recording in multiple domains.  Coherence and rhythmicity is computed to  identify EEG sections which may contain organized seizures.  Each channel undergoes analysis to detect presence of spike and sharp waves which have special and morphological characteristic of epileptic activity.  The routine EEG recording is converted from spacial into frequency domain.  This is then displayed comparing homologous areas to identify areas of significant asymmetry.  Algorithm to identify non-cortically generated artifact is used to separate eye movement, EMG and other artifact from the EEG.      RECORDING TIMES  Start on 5/24/23 at 08:36  Stop on 5/24/23 at 13:23    A total of 4 hours and 47 minutes EEG recording is obtained.    EEG FINDINGS  Background activity:   Study with multiple artifacts in both the right and left hemispheres limiting background interpretation.       Sleep:  No clear sleep architecture appreciated    Activation procedures:   Hyperventilation is not performed  Photic stimulation is not performed    Cardiac Monitor:   Electrode caps do not have EKG capabilities    Events:  Per report, patient had a clinical seizure overnight 5/23-5/24.      Impression:   Limited study due to multiple electrode artifacts.  The previously seen periodic discharges and electrographic seizures are not clearly appreciated, but it is not clear whether this is due to suppression of activity vs activity being hidden by artifact.  Recommend standard EEG hookup when possible.    Brenda Christian MD  [facility]   Department of Neurology/Epilepsy

## 2023-05-24 NOTE — ASSESSMENT & PLAN NOTE
- admit to NCC r/o status  - continuous EEG  - Non-titrating propofol @ 50  - Non-titrating versed @ 5  - resume Vimpat 200 mg BID  - continue Onfi 20 mg daily  - Vimpat level 2.0

## 2023-05-24 NOTE — PROGRESS NOTES
"Pulm/CC Fellow Consult Note    Attending Physician: Andi Lou III, MD    Date of Admit: 2023    Chief Complaint: Non-convulsive status epilepticus     History of Present Illness:  Yeison Andrade is a 38 y.o.  male with a PMHx SDH/TBI s/p craniectomy 2020, Bipolar disorder, HTN, and known history of seizure since his SDH presented to the ER at [place] on  as an unknown person s/p two seizures. He had a witness generalized seizure by EMS and was given 10mg IM versed in the field prior to arrival. On arrival to the ER, the patient was minimally responsive with labs that supported a recent seizure like episode. Because of his continued lack of arousal, he was admitted to the ICU for close monitoring.      Record review indicates that this is Yeison Andrade,  1985, with multiple records noted in the [facility] system. He was last seen by [facility] on 4/3/2023 where he had Keppra switched to VimPat secondary to side effects. He is also noted to have a recent [facility] admission 3/2/23 for seizures after he ran out of his Keppra.     Record review indicates that he was recently a social drinker, less than half a pack per day smoker, and daily marijuana user.      Subjective:  Pt had recurrent seizure overnight again even on versed drip and propofol. Neurology recommended a "spot" EEG for recurrent seizure-like episodes and titrations of sedation accordingly.     Past Medical History:  Past Medical History:   Diagnosis Date    Hypertension     Seizures        Past Surgical History:  History reviewed. No pertinent surgical history.    Allergies:  Review of patient's allergies indicates:  No Known Allergies    Family History:  History reviewed. No pertinent family history.    Social History:          Objective:   Last 24 Hour Vital Signs:  BP  Min: 100/62  Max: 214/114  Temp  Av.4 °F (36.3 °C)  Min: 95.2 °F (35.1 °C)  Max: 99 °F (37.2 °C)  Pulse  Av.3  Min: 52  Max: 110  Resp  Av.5  Min: 11  Max: " 31  SpO2  Av.7 %  Min: 87 %  Max: 100 %  Body mass index is 20.48 kg/m².  I/O last 3 completed shifts:  In: 87670.8 [I.V.:8639.3; IV Piggyback:2174.4]  Out: 8260 [Urine:8060; Emesis/NG output:200]    Physical Exam:  General: Intubated, sedated  HENT:  NC; R scar through hairline from previous craniotomy noted;  anicteric sclera; no nystagmus noted, sluggish pupillary response, equal bilaterally  Cardio:  Regular rate and rhythm with normal S1 and S2; no murmurs or rubs  Resp:  CTAB; respirations unlabored; no wheezes, crackles or rhonchi  Abdom:  Soft, Non-tender  Extrem:  WWP with no clubbing, cyanosis or edema  Pulses:  2+ and symmetric distally  Neuro:  Heavily sedated     Assessment & Plan:   37yo M w/ PMH of SDH/craniectomy and subsequent seizure disorder who presents with recurrent seizures.      Neuro  #TBI/SDH s/p craniectomy   #Epilepsy  Patient had recurrent seizures x 2 in the outpatient setting, given 10mg IV versed returned to baseline, but continued to have recurrent seizures with continuous focal activity on the EEG. He was recently changed to Vimpat by [facility] neurology last month. Unclear if this recurrence was therapeutic failure, subtherapeutic levels, medication non-compliance, or other precipitating factors. Patient now with recurring seizures on two AEDs, intubated for versed drip with continued breakthrough seizures and pending transfer to [facility]. Will touch base with neurology today on uptitration of sedation as well as spot EEG.  - Vimpat BID  - Keppra BID  - versed at 5  - Propofol at 50   - follow up with neurology    - will need a repeat spot EEG    - initiating cloabazam load + maintenance     CVS  - pt remains HD stable without need for intervention     Pulm  #Tobacco use  - will discuss tobacco and marijuana use when patient is more awake     GI  - no issues noted     Renal/Lytes  #ALONDRA  #Rhabdomyolysis  Baseline Cr around 0.9. Now with new ALONDRA  that has improved,  increasing  CPK likely secondary to his generalized seizure. CPK has peaked and is now downtrending but still markedly elevated. Continue aggressive IVF rehydration and target higher urine outputs until the CPK is down-trending and less than 5000.   - IVFs to target -300cc/hr  - repeat CPK, BMP q6 until improvement  - avoid volume overload, still on 21% FiO2    #Hyomagnesemia  #Hypophosphatemia  Patient previously with normal levels, now decreased in the last 24 hours. Will replete and recheck.   - follow up levels after replacement     Heme  #Normocytic anemia  - Baseline Hb around 12     ID  #Leukocytosis   - resolved  - LP performed on admission not suggestive of infection     Endocrine  - No known issues     Disposition: Patient is now intubated and sedated. On versed at 5 and propofol at 50. Awaiting further neurology recommendations while awaiting patient transfer to the [facility] ICU for refractory status. Will initiate tube feeds and replete electrolytes.      Justin Ellerman, MD  Fellow  [facility]

## 2023-05-24 NOTE — HPI
39 yo M w/ PMHx of SDH/TBI s/p craniectomy 12/2020, Bipolar disorder, HTN, and known history of seizure presented to [facility] ED on 5/22/23 without identifiable information after witnessed seizure. Was naked on presentation and incontinent of urine. Pt could not be identifed on presentation, was brought in by friend who also did not provide any information. Was given 10 mg Versed IM via EMS. Was obtunded and minimally responsive to painful stimuli.Pt intubated and sedated 5/23/23 in preparation for transfer to Neuro ICU. Had breakthrough seizure overnight despite sedation on Propofol and Versed, requiring Ativan push. EEG cap restarted this morning, discussed with Epileptologist at [facility] [place]. Spot EEG unable to be obtained as technician has limited availability at [facility]. Admitted to [facility] for continuous EEG and neuro monitoring.

## 2023-05-24 NOTE — NURSING
Notified On Call Dr. Tayler MENDEZ of patient having urine output less than 200 ml/h.  Verbal order received to increase IV fluids, LR from 150 cc to 250 cc/h.    Will continue to monitor.

## 2023-05-24 NOTE — NURSING
Bedside report received from nurse Heri RN.    Pt in bed, intubated with ETT size 7, at 27 lip line, on 500 Tidal volume, PEEP of 5, 21%FiO2, Rate of 20.    Pt with soft BUE restraints, on propofol at 50 mcg/k/min, Versed drip at 5 mg/h, with LR running at 150 cc/h.    Garcia catheter, OGT, PICC TLC, in place.    Initial assessment in progress.    Will continue to monitor.

## 2023-05-24 NOTE — DISCHARGE SUMMARY
[facility] Medicine  Discharge Summary      Patient Name: Yeison Andrade  MRN: [id number]  BONITA: [id number]  Patient Class: IP- Inpatient  Admission Date: 5/22/2023  Hospital Length of Stay: 2 days  Discharge Date and Time:  05/24/2023 1:59 PM  Attending Physician: Andi Lou III, MD   Discharging Provider: Arabella Becerra MD  Primary Care Provider: No primary care provider on file.    Primary Care Team: Networked reference to record PCT     HPI:   37 yo M w/ PMHx of SDH/TBI s/p craniectomy 12/2020, Bipolar disorder, HTN, and known history of seizure presented to [facility] ED on 5/22/23 without identifiable information after witnessed seizure. Was naked on presentation and incontinent of urine. Pt could not be identifed on presentation, was brought in by friend who also did not provide any information. Was given 10 mg Versed IM via EMS. Was obtunded and minimally responsive to painful stimuli.    Pt later was identified [place] [facility] Last seen by [facility] in April 2023 and was switched to Vimpat from Keppra. Had previous [facility] hospitalization for seizures 2/2 to running out of Keppra.     When seen in ED, pt was still obtunded with sluggish pupils. Responded to noxious stimuli. Only moving Right side of body, unclear if that is is his baseline. GCS 11 - E3, V2, M6. Following verbal commands.    In ED, initial vitals were /72, , RR 40, temp 100.8 F (38.2) and SpO2 92%. Labs: WBC 18, glucose 107. Troponin 0.141, lactate 9.7, , procal negative. TSH 1.382. UA negative nitrite, >100 RBC, 28 WBC. Flu and covid negative. Blood cultures in process. EKG: No STEMI, sinus tachycardia. CTH: no acute findings. Extensive Right hemispheric encephalomalacia. Pt admitted to [facility] for seizures and AMS, requiring ICU level care.      * No surgery found *      Hospital Course:   Pt seen s/p 2 seizures in ED. Neuro recommended increasing Vimpat to 200 mg BID and 400  mg Vimpat load. Pt proceeded to have seizure overnight requiring 2 mg lorazepam, despite Vimpat load and increased dose. Pt was started on Keppra 3g load and maintenance Keppra 2g BID. Pt on EEG cap, with readings concerning for status epilepticus. Pt accepted for transfer to Neuro ICU, was intubated and sedated on Midazolam 5 mg/hr, Propofol 50 mcg/kg/min, and Fentanyl 37.5 mg/hr. Had breakthrough seizure overnight from 5/23/23 to 5/24/23 despite sedation and two AEDs, requiring Ativan push. Family Medicine team consulted with Epileptologist at [facility] who recommended starting clobazam 40 mg x1 followed by 20 mg BID in addition to his current Keppra and Vimpat. Onfi preferred over topiramate as it does not cause metabolic acidosis. Another option being to increase Versed and/or Propofol. Fentanyl was discontinued and Onfi was started per recs.    Had elevated troponin which downtrended.  --> 6946 --> 27895 --> 70482. IV LR given and titrated to a goal UOP of 200-300 mL/hr for tx of Rhabdo. Pt had low UOP <100 mL/hr on 5/24/23 and IV LR was increased to 400 mL/hr. CK downtrended to 30254 --> 70748. Goal CK of <5000, recommend trending until goal is reached. Lactate downtrended from 9.7 --> 2.4. Pt was strictly NPO and so home lisinopril 5 mg held, after intubation it was held due to hypotension; would restart as appropriate with OGT. Initially had ALONDRA that resolved, renal function stable. Pt initially on Vanc and Rocephin for 24 hours which was discontinued due to low concern for infection. Blood Cx NGTD x2. LP with Glucose 78 (H), protein 58 (H), gram stain CSF: No WBC or organisms seen. UA negative leuks, negative nitrites, 3+ occult blood, >100 RBC, 28 WBC, few bacteria, 8 hyaline casts.    Pt intubated and sedated in ICU. Tube feeds started via OGT. PICC placed on 5/23/23 and continued on d/c. Will need repeat BMP and CK, last ordered for 3PM on 5/24/23. Vent settings on discharge: tidal volume  500, PEEP 5, FiO2 21% and RR 16. Vimpat level in process. Pt transferred to Neuro ICU at [facility] for Status Epilepticus.       Goals of Care Treatment Preferences:  Code Status: Full Code      Consults:   Consults (From admission, onward)        Status Ordering Provider     Inpatient consult to Registered Dietitian/Nutritionist  Once        Provider:  (Not yet assigned)    Acknowledged TAMANNA JIMENEZ     [facility]  Once        Provider:  (Not yet assigned)    Acknowledged ELLERMAN, JUSTIN     Inpatient consult to Pulmonology  Once        Provider:  Justin Ellerman, MD    Completed EVAN GONZALEZ     Inpatient consult to [facility]  Once        Provider:  Lukas Veras Jr., MD    Completed TAMANNA JIMENEZ          No new Assessment & Plan notes have been filed under this hospital service since the last note was generated.  Service: Hospital Medicine    Final Active Diagnoses:    Diagnosis Date Noted POA    PRINCIPAL PROBLEM:  Seizure [R56.9] 05/22/2023 Unknown    Non-traumatic rhabdomyolysis [M62.82] 05/24/2023 Yes    Status epilepticus [G40.901] 05/24/2023 Yes    Altered mental status [R41.82] 05/22/2023 Unknown    HTN (hypertension) [I10] 05/22/2023 Unknown    Troponin level elevated [R77.8] 05/22/2023 Unknown    ALONDRA (acute kidney injury) [N17.9] 05/22/2023 Unknown      Problems Resolved During this Admission:       Discharged Condition: good    Disposition:     Follow Up:    Patient Instructions:   No discharge procedures on file.    Significant Diagnostic Studies: Labs:   CMP   Recent Labs   Lab 05/22/23  1840 05/23/23  0414 05/24/23  0324    139 142   K 4.0 3.3* 3.1*    104 110   CO2 22* 25 26    112* 87   BUN 19 11 11   CREATININE 1.4 1.2 1.2   CALCIUM 8.9 9.5 8.4*   PROT  --  7.2 4.9*   ALBUMIN  --  4.3 2.9*   BILITOT  --  1.7* 1.4*   ALKPHOS  --  54* 40*   AST  --  343* 219*   ALT  --  88* 70*   ANIONGAP 12 10 6*   , CBC   Recent Labs    Lab 05/23/23  0414 05/24/23  0324   WBC 20.64* 9.48   HGB 13.2* 10.8*   HCT 39.2* 32.6*    175   , INR No results found for: INR, PROTIME, Lipid Panel No results found for: CHOL, HDL, LDLCALC, TRIG, CHOLHDL, Troponin   Recent Labs   Lab 05/22/23  0805 05/22/23  1241 05/22/23  1840   TROPONINI 0.141* 0.585* 0.504*   , A1C: No results for input(s): HGBA1C in the last 4320 hours. and All labs within the past 24 hours have been reviewed    Microbiology:   Blood Culture   Lab Results   Component Value Date    LABBLOO No Growth to date 05/22/2023    LABBLOO No Growth to date 05/22/2023    LABBLOO No Growth to date 05/22/2023       Radiology:     Imaging Results          CT Head Without Contrast (Final result)  Result time 05/22/23 09:16:52    Final result by Rivera Lowery MD (05/22/23 09:16:52)                 Impression:      1. No definite acute intracranial findings.  2. Extensive right hemispheric encephalomalacia and gliosis and remote possible operative sequela, as discussed.      Electronically signed by: Rivera Lowery  Date:    05/22/2023  Time:    09:16             Narrative:    EXAMINATION:  CT HEAD WITHOUT CONTRAST    CLINICAL HISTORY:  Mental status change, unknown cause;    TECHNIQUE:  Low dose axial images were obtained through the head.  Coronal and sagittal reformations were also performed. Contrast was not administered.    COMPARISON:  None.    FINDINGS:  Blood: No acute intracranial hemorrhage.    Parenchyma: No definite loss of gray-white differentiation to suggest acute or subacute transcortical infarct. Extensive right hemispheric encephalomalacia gliosis is noted involving the right frontal, temporal, parietal lobes deep to remote right-sided craniectomy/cranioplasty sequela.  Ex vacuo dilatation of the right lateral ventricle noted.  Suspect overall age advanced parenchymal volume loss elsewhere.    Ventricles/Extra-axial spaces: As above.  Basal cisterns appear patent.    Vessels: No  significant calcifications.    Orbits: Unremarkable.    Scalp: Unremarkable.    Skull: As above.  Some degree of resorption of the skull flap is suggested.    Sinuses and mastoids: Essentially clear.    Other findings: None                               X-Ray Chest 1 View (Final result)  Result time 05/22/23 09:08:37    Final result by Sheela Noonan MD (05/22/23 09:08:37)                 Impression:      No acute intrathoracic process seen.      Electronically signed by: Sheela Noonan MD  Date:    05/22/2023  Time:    09:08             Narrative:    EXAMINATION:  XR CHEST 1 VIEW    CLINICAL HISTORY:  Altered mental status;    TECHNIQUE:  Single frontal view of the chest was performed.    COMPARISON:  None    FINDINGS:  The cardiac silhouette is midline, normal in size.  The pulmonary vascularity is normal.    The lungs are clear.    No pleural effusion.  No pneumothorax.    The osseous structures appear normal.                                  Pending Diagnostic Studies:     Procedure Component Value Units Date/Time    CK [[id number]]     Order Status: Sent Lab Status: No result     Specimen: Blood     Freeze and Hold, [facility] [[id number]] Collected: 05/22/23 1008    Order Status: Sent Lab Status: No result     Specimen: CSF (Spinal Fluid) from Cerebrospinal Fluid              Medications:  Transfer Medications (for Discharge Readmit only):   Current Facility-Administered Medications   Medication Dose Route Frequency Provider Last Rate Last Admin    0.9%  NaCl infusion   Intravenous PRN Andi Lou III, MD   Stopped at 05/24/23 0637    acetaminophen tablet 650 mg  650 mg Per OG tube Q6H PRN Brady Rubin DO        cloBAZam Tab 20 mg  20 mg Per OG tube BID Andi Lou III, MD        dextrose 10% bolus 125 mL 125 mL  12.5 g Intravenous PRN Arabella Becerra MD        dextrose 10% bolus 250 mL 250 mL  25 g Intravenous PRN Arabella Becerra MD        dextrose 40 % gel 15,000 mg  15 g Oral PRN Arabella Becerra  MD        dextrose 40 % gel 30,000 mg  30 g Oral PRN Arabella Becerra MD        enoxaparin injection 40 mg  40 mg Subcutaneous Q24H (prophylaxis, 1700) Arabella Becerra MD   40 mg at 05/23/23 1732    famotidine tablet 20 mg  20 mg Per OG tube BID Arabella Becerra MD   20 mg at 05/24/23 1029    fentaNYL 50 mcg/mL injection 50 mcg  50 mcg Intravenous Q1H PRN Justin Ellerman, MD        glucagon (human recombinant) injection 1 mg  1 mg Intramuscular PRN Arabella Becerra MD        lacosamide (VIMPAT) 200 mg in sodium chloride 0.9% 100 mL IVPB  200 mg Intravenous Q12H Justin Ellerman, MD   Stopped at 05/24/23 0546    lactated ringers infusion   Intravenous Continuous Brady Rubin  mL/hr at 05/24/23 1300 Rate Verify at 05/24/23 1300    levETIRAcetam injection 2,000 mg  2,000 mg Intravenous Q12H Brady Rubin DO   2,000 mg at 05/24/23 0813    LORazepam injection 2 mg  2 mg Intravenous Q10 Min PRN Ajay Lester MD   2 mg at 05/23/23 2202    midazolam 100 mg/100 mL in dextrose 5% infusion  0-5 mg/hr Intravenous Continuous Brady Rubin DO 5 mL/hr at 05/24/23 1300 5 mg/hr at 05/24/23 1300    mupirocin 2 % ointment   Nasal BID Andi Lou III, MD   Given at 05/24/23 0813    naloxone 0.4 mg/mL injection 0.02 mg  0.02 mg Intravenous PRN Arabella Becerra MD        ondansetron injection 8 mg  8 mg Intravenous Q6H PRN Brady Rubin DO   8 mg at 05/22/23 2319    polyethylene glycol packet 17 g  17 g Per NG tube Daily Brady Rubin DO   17 g at 05/24/23 1240    potassium, sodium phosphates 280-160-250 mg packet 2 packet  2 packet Per OG tube Q3H Andi Lou III, MD   2 packet at 05/24/23 1137    propofol (DIPRIVAN) 10 mg/mL infusion  0-50 mcg/kg/min (Dosing Weight) Intravenous Continuous Justin Ellerman, MD 20.6 mL/hr at 05/24/23 1330 50 mcg/kg/min at 05/24/23 1330    sodium chloride 0.9% flush 10 mL  10 mL Intravenous Q6H Andi Lou III, MD   10 mL at 05/24/23 1200    And    sodium  chloride 0.9% flush 10 mL  10 mL Intravenous PRN Andi Lou III, MD        sodium chloride 0.9% flush 3 mL  3 mL Intravenous PRN Arabella Becerra MD        sodium chloride 0.9% flush 5 mL  5 mL Intravenous PRN Arabella Becerra MD           Indwelling Lines/Drains at time of discharge:   Lines/Drains/Airways     Peripherally Inserted Central Catheter Line  Duration           PICC Triple Lumen 05/23/23 1605 right brachial <1 day          Drain  Duration                NG/OG Tube 05/23/23 1545 16 Fr. Center mouth <1 day         Urethral Catheter 05/23/23 1430 <1 day          Airway  Duration                Airway - Non-Surgical 05/23/23 1540 Endotracheal Tube <1 day                Time spent on the discharge of patient: 30 minutes          Arabella Becerra MD  Department of Hospital Medicine  [facility]

## 2023-05-24 NOTE — RESPIRATORY THERAPY
Patient arrived via EMS intubated with size 7.0 ETT placed 27 @ lips.  ETT is intact, patent, and secured with commercial tube vargas.  Placed patient on MV set on documented settings.  Will continue to monitor.

## 2023-05-24 NOTE — SUBJECTIVE & OBJECTIVE
Past Medical History:   Diagnosis Date    Hypertension     Seizures      No past surgical history on file.   Current Facility-Administered Medications on File Prior to Encounter   Medication Dose Route Frequency Provider Last Rate Last Admin    [COMPLETED] cloBAZam Tab 40 mg  40 mg Per OG tube Once Andi Lou III, MD   40 mg at 23 1028    [] lactated ringers infusion   Intravenous Continuous Brady Rubin DO   Stopped at 23 1233    [COMPLETED] magnesium sulfate 2g in water 50mL IVPB (premix)  2 g Intravenous Once Ajay Lester MD   Stopped at 23 1024    [COMPLETED] potassium chloride 40 mEq in 100 mL IVPB (FOR CENTRAL LINE ADMINISTRATION ONLY)  40 mEq Intravenous Once Andi Lou III, MD   Stopped at 23 1216    [] succinylcholine (ANECTINE) 20 mg/mL injection             [DISCONTINUED] 0.9%  NaCl infusion   Intravenous PRN Andi Lou III, MD   Stopped at 23 0637    [DISCONTINUED] acetaminophen tablet 650 mg  650 mg Oral Q6H PRN Brady Rubin DO   650 mg at 23 2343    [DISCONTINUED] acetaminophen tablet 650 mg  650 mg Per OG tube Q6H PRN Brady Rubin DO        [DISCONTINUED] cloBAZam suspension 20 mg  20 mg Per OG tube BID Brady Rubin DO        [DISCONTINUED] cloBAZam suspension 40 mg  40 mg Per OG tube Once Brady Rubin DO        [DISCONTINUED] cloBAZam Tab 20 mg  20 mg Per OG tube BID Andi Lou III, MD        [DISCONTINUED] dextrose 10% bolus 125 mL 125 mL  12.5 g Intravenous PRN Arabella Becerra MD        [DISCONTINUED] dextrose 10% bolus 250 mL 250 mL  25 g Intravenous PRN Arabella Becerra MD        [DISCONTINUED] dextrose 40 % gel 15,000 mg  15 g Oral PRN Arabella Becerra MD        [DISCONTINUED] dextrose 40 % gel 30,000 mg  30 g Oral PRN Arabella Becerra MD        [DISCONTINUED] enoxaparin injection 40 mg  40 mg Subcutaneous Q24H (prophylaxis, 1700) Arabella Becerra MD   40 mg at 23 1732    [DISCONTINUED] famotidine tablet  20 mg  20 mg Per OG tube BID Arabella Becerra MD   20 mg at 05/24/23 1029    [DISCONTINUED] fentaNYL 2500 mcg in 0.9% sodium chloride 250 mL infusion premix (titrating)  0-200 mcg/hr Intravenous Continuous Justin Ellerman, MD   Stopped at 05/24/23 1222    [DISCONTINUED] fentaNYL 50 mcg/mL injection 50 mcg  50 mcg Intravenous Q1H PRN Justin Ellerman, MD        [DISCONTINUED] glucagon (human recombinant) injection 1 mg  1 mg Intramuscular PRN Arabella Becerra MD        [DISCONTINUED] lacosamide (VIMPAT) 200 mg in sodium chloride 0.9% 100 mL IVPB  200 mg Intravenous Q12H Justin Ellerman, MD   Stopped at 05/24/23 0546    [DISCONTINUED] lactated ringers infusion   Intravenous Continuous Brady Rubin  mL/hr at 05/24/23 1300 Rate Verify at 05/24/23 1300    [DISCONTINUED] levETIRAcetam injection 2,000 mg  2,000 mg Intravenous Q12H Brady Rubin DO   2,000 mg at 05/24/23 0813    [DISCONTINUED] LORazepam injection 2 mg  2 mg Intravenous Q10 Min PRN Ajay Lester MD   2 mg at 05/23/23 2202    [DISCONTINUED] midazolam 100 mg/100 mL in dextrose 5% infusion  0-5 mg/hr Intravenous Continuous Brady Rubin DO 5 mL/hr at 05/24/23 1300 5 mg/hr at 05/24/23 1300    [DISCONTINUED] mupirocin 2 % ointment   Nasal BID Andi Lou III, MD   Given at 05/24/23 0813    [DISCONTINUED] naloxone 0.4 mg/mL injection 0.02 mg  0.02 mg Intravenous PRN Arabella Becerra MD        [DISCONTINUED] ondansetron injection 8 mg  8 mg Intravenous Q6H PRN Brady Rubin DO   8 mg at 05/22/23 2319    [DISCONTINUED] polyethylene glycol packet 17 g  17 g Per NG tube Daily Brady Rubin DO   17 g at 05/24/23 1240    [DISCONTINUED] potassium chloride 10 mEq in 100 mL IVPB  10 mEq Intravenous Q1H Ajay Lester MD   Stopped at 05/24/23 0737    [DISCONTINUED] potassium chloride 10 mEq in 100 mL IVPB  10 mEq Intravenous Once Andi Lou III, MD        [DISCONTINUED] potassium, sodium phosphates 280-160-250 mg packet 2 packet  2  packet Per OG tube QID (AC & HS) Ajay Lester MD   2 packet at 05/24/23 0633    [DISCONTINUED] potassium, sodium phosphates 280-160-250 mg packet 2 packet  2 packet Per OG tube Q3H Andi Lou III, MD   2 packet at 05/24/23 1137    [DISCONTINUED] propofol (DIPRIVAN) 10 mg/mL infusion  0-50 mcg/kg/min (Dosing Weight) Intravenous Continuous Justin Ellerman, MD 20.6 mL/hr at 05/24/23 1330 50 mcg/kg/min at 05/24/23 1330    [DISCONTINUED] senna-docusate 8.6-50 mg per tablet 1 tablet  1 tablet Oral BID PRESSENCE Becerra MD        [DISCONTINUED] sodium chloride 0.9% flush 10 mL  10 mL Intravenous Q6H Andi Lou III, MD   10 mL at 05/24/23 1200    [DISCONTINUED] sodium chloride 0.9% flush 10 mL  10 mL Intravenous PRN Andi Lou III, MD        [DISCONTINUED] sodium chloride 0.9% flush 3 mL  3 mL Intravenous PRN Arabella Becerra MD        [DISCONTINUED] sodium chloride 0.9% flush 5 mL  5 mL Intravenous PRN Arabella Becerra MD        [DISCONTINUED] succinylcholine (ANECTINE) 20 mg/mL injection              Current Outpatient Medications on File Prior to Encounter   Medication Sig Dispense Refill    lacosamide (VIMPAT) 50 mg Tab Take 100 mg by mouth 2 (two) times a day.      lisinopriL (PRINIVIL,ZESTRIL) 5 MG tablet Take 5 mg by mouth once daily.        Allergies: Patient has no known allergies.    No family history on file.     Review of Systems   Reason unable to perform ROS: decrease LOC.   Objective:     Vitals:    Temp: 98.3 °F (36.8 °C)  Pulse: 75  Rhythm: normal sinus rhythm  BP: (!) 181/98  MAP (mmHg): 134  Resp: 16  SpO2: 100 %  Oxygen Concentration (%): 21  Vent Mode: A/C  Set Rate: 16 BPM  Vt Set: 500 mL  PEEP/CPAP: 8 cmH20  Peak Airway Pressure: 24 cmH20  Mean Airway Pressure: 12 cmH20    Temp  Min: 95.2 °F (35.1 °C)  Max: 99 °F (37.2 °C)  Pulse  Min: 67  Max: 101  BP  Min: 100/62  Max: 181/98  MAP (mmHg)  Min: 73  Max: 134  Resp  Min: 16  Max: 26  SpO2  Min: 100 %  Max: 100 %  Oxygen Concentration (%)  Min: 21  Max:  30    No intake/output data recorded.   Unmeasured Output  Stool Occurrence: 1        Physical Exam  Constitutional:       Appearance: He is ill-appearing.   HENT:      Head: Normocephalic.      Nose: Nose normal.      Mouth/Throat:      Mouth: Mucous membranes are moist.      Pharynx: Oropharynx is clear.   Eyes:      Pupils: Pupils are equal, round, and reactive to light.   Cardiovascular:      Rate and Rhythm: Normal rate and regular rhythm.      Pulses: Normal pulses.      Heart sounds: Normal heart sounds.   Pulmonary:      Effort: Pulmonary effort is normal.      Breath sounds: Normal breath sounds.   Abdominal:      General: Bowel sounds are normal.      Palpations: Abdomen is soft.   Musculoskeletal:         General: Normal range of motion.   Skin:     General: Skin is warm and dry.      Capillary Refill: Capillary refill takes 2 to 3 seconds.   Neurological:      Comments: GCS T3  PERRL  WD X4  Sensation Intact      Unable to test orientation, language, memory, judgment, insight, fund of knowledge, hearing, shoulder shrug, tongue protrusion, coordination, gait due to level of consciousness.       Today I personally reviewed pertinent medications, lines/drains/airways, imaging, cardiology results, laboratory results, microbiology results, notably:

## 2023-05-24 NOTE — PROGRESS NOTES
1230 Report called to Mac, nurse receiving patient to MICU [id number]. All questions answered.     1330 EMS at bedside to transfer patient to [facility] ICU. Patient VSS, sent with Propofol infusing at 50mcg and Versed gtt infusing at 5. LR d/c'd for transport. Garcia, PIV, ETT, PICC and OG tube in place. Report given to EMS, all questions answered. Belongings sent with patient bag with card, cellphone. Mother notified of transfer.

## 2023-05-25 LAB
ALBUMIN SERPL BCP-MCNC: 2.7 G/DL (ref 3.5–5.2)
ALLENS TEST: ABNORMAL
ALP SERPL-CCNC: 48 U/L (ref 55–135)
ALT SERPL W/O P-5'-P-CCNC: 59 U/L (ref 10–44)
AMMONIA PLAS-SCNC: 27 UMOL/L (ref 10–50)
ANION GAP SERPL CALC-SCNC: 12 MMOL/L (ref 8–16)
AST SERPL-CCNC: 134 U/L (ref 10–40)
AV INDEX (PROSTH): 0.96
AV MEAN GRADIENT: 3 MMHG
AV PEAK GRADIENT: 5 MMHG
AV VALVE AREA: 3.31 CM2
AV VELOCITY RATIO: 1.12
BACTERIA #/AREA URNS AUTO: ABNORMAL /HPF
BACTERIA #/AREA URNS AUTO: ABNORMAL /HPF
BASOPHILS # BLD AUTO: 0.01 K/UL (ref 0–0.2)
BASOPHILS NFR BLD: 0.1 % (ref 0–1.9)
BILIRUB SERPL-MCNC: 0.9 MG/DL (ref 0.1–1)
BILIRUB UR QL STRIP: NEGATIVE
BILIRUB UR QL STRIP: NEGATIVE
BSA FOR ECHO PROCEDURE: 1.87 M2
BUN SERPL-MCNC: 8 MG/DL (ref 6–20)
CALCIUM SERPL-MCNC: 8.3 MG/DL (ref 8.7–10.5)
CHLORIDE SERPL-SCNC: 109 MMOL/L (ref 95–110)
CK SERPL-CCNC: 8245 U/L (ref 20–200)
CLARITY UR REFRACT.AUTO: ABNORMAL
CLARITY UR REFRACT.AUTO: CLEAR
CO2 SERPL-SCNC: 21 MMOL/L (ref 23–29)
COLOR UR AUTO: YELLOW
COLOR UR AUTO: YELLOW
CREAT SERPL-MCNC: 0.9 MG/DL (ref 0.5–1.4)
CV ECHO LV RWT: 0.43 CM
DELSYS: ABNORMAL
DIFFERENTIAL METHOD: ABNORMAL
DOP CALC AO PEAK VEL: 1.17 M/S
DOP CALC AO VTI: 24.99 CM
DOP CALC LVOT AREA: 3.5 CM2
DOP CALC LVOT DIAMETER: 2.1 CM
DOP CALC LVOT PEAK VEL: 1.31 M/S
DOP CALC LVOT STROKE VOLUME: 82.67 CM3
DOP CALCLVOT PEAK VEL VTI: 23.88 CM
E WAVE DECELERATION TIME: 241.76 MSEC
E/A RATIO: 0.89
E/E' RATIO: 7.22 M/S
ECHO LV POSTERIOR WALL: 0.98 CM (ref 0.6–1.1)
EJECTION FRACTION: 60 %
EOSINOPHIL # BLD AUTO: 0.3 K/UL (ref 0–0.5)
EOSINOPHIL NFR BLD: 3.4 % (ref 0–8)
ERYTHROCYTE [DISTWIDTH] IN BLOOD BY AUTOMATED COUNT: 12.6 % (ref 11.5–14.5)
ERYTHROCYTE [SEDIMENTATION RATE] IN BLOOD BY WESTERGREN METHOD: 16 MM/H
EST. GFR  (NO RACE VARIABLE): >60 ML/MIN/1.73 M^2
FIO2: 30
FRACTIONAL SHORTENING: 29 % (ref 28–44)
GLUCOSE SERPL-MCNC: 81 MG/DL (ref 70–110)
GLUCOSE UR QL STRIP: NEGATIVE
GLUCOSE UR QL STRIP: NEGATIVE
HCO3 UR-SCNC: 23.7 MMOL/L (ref 24–28)
HCT VFR BLD AUTO: 32.3 % (ref 40–54)
HGB BLD-MCNC: 10.4 G/DL (ref 14–18)
HGB UR QL STRIP: ABNORMAL
HGB UR QL STRIP: ABNORMAL
HYALINE CASTS UR QL AUTO: 1 /LPF
IMM GRANULOCYTES # BLD AUTO: 0.02 K/UL (ref 0–0.04)
IMM GRANULOCYTES NFR BLD AUTO: 0.2 % (ref 0–0.5)
INTERVENTRICULAR SEPTUM: 1.01 CM (ref 0.6–1.1)
KETONES UR QL STRIP: ABNORMAL
KETONES UR QL STRIP: ABNORMAL
LA MAJOR: 5.34 CM
LA MINOR: 5.23 CM
LA WIDTH: 3.83 CM
LEFT ATRIUM SIZE: 2.8 CM
LEFT ATRIUM VOLUME INDEX MOD: 26.9 ML/M2
LEFT ATRIUM VOLUME INDEX: 25.5 ML/M2
LEFT ATRIUM VOLUME MOD: 50.76 CM3
LEFT ATRIUM VOLUME: 48.17 CM3
LEFT INTERNAL DIMENSION IN SYSTOLE: 3.2 CM (ref 2.1–4)
LEFT VENTRICLE DIASTOLIC VOLUME INDEX: 49.71 ML/M2
LEFT VENTRICLE DIASTOLIC VOLUME: 93.95 ML
LEFT VENTRICLE MASS INDEX: 81 G/M2
LEFT VENTRICLE SYSTOLIC VOLUME INDEX: 21.7 ML/M2
LEFT VENTRICLE SYSTOLIC VOLUME: 41.1 ML
LEFT VENTRICULAR INTERNAL DIMENSION IN DIASTOLE: 4.53 CM (ref 3.5–6)
LEFT VENTRICULAR MASS: 153.86 G
LEUKOCYTE ESTERASE UR QL STRIP: ABNORMAL
LEUKOCYTE ESTERASE UR QL STRIP: ABNORMAL
LV LATERAL E/E' RATIO: 6.5 M/S
LV SEPTAL E/E' RATIO: 8.13 M/S
LYMPHOCYTES # BLD AUTO: 1.6 K/UL (ref 1–4.8)
LYMPHOCYTES NFR BLD: 19.2 % (ref 18–48)
MAGNESIUM SERPL-MCNC: 1.6 MG/DL (ref 1.6–2.6)
MCH RBC QN AUTO: 29.1 PG (ref 27–31)
MCHC RBC AUTO-ENTMCNC: 32.2 G/DL (ref 32–36)
MCV RBC AUTO: 91 FL (ref 82–98)
MICROSCOPIC COMMENT: ABNORMAL
MICROSCOPIC COMMENT: ABNORMAL
MIN VOL: 8.05
MODE: ABNORMAL
MONOCYTES # BLD AUTO: 0.6 K/UL (ref 0.3–1)
MONOCYTES NFR BLD: 6.8 % (ref 4–15)
MV PEAK A VEL: 0.73 M/S
MV PEAK E VEL: 0.65 M/S
MV STENOSIS PRESSURE HALF TIME: 70.11 MS
MV VALVE AREA P 1/2 METHOD: 3.14 CM2
NEUTROPHILS # BLD AUTO: 5.8 K/UL (ref 1.8–7.7)
NEUTROPHILS NFR BLD: 70.3 % (ref 38–73)
NITRITE UR QL STRIP: NEGATIVE
NITRITE UR QL STRIP: NEGATIVE
NON-SQ EPI CELLS #/AREA URNS AUTO: 0 /HPF
NRBC BLD-RTO: 0 /100 WBC
PCO2 BLDA: 34.6 MMHG (ref 35–45)
PEEP: 8
PH SMN: 7.44 [PH] (ref 7.35–7.45)
PH UR STRIP: 5 [PH] (ref 5–8)
PH UR STRIP: 8 [PH] (ref 5–8)
PHOSPHATE SERPL-MCNC: 3.5 MG/DL (ref 2.7–4.5)
PIP: 25
PLATELET # BLD AUTO: 179 K/UL (ref 150–450)
PMV BLD AUTO: 11.1 FL (ref 9.2–12.9)
PO2 BLDA: 154 MMHG (ref 80–100)
POC BE: 0 MMOL/L
POC SATURATED O2: 99 % (ref 95–100)
POC TCO2: 25 MMOL/L (ref 23–27)
POTASSIUM SERPL-SCNC: 3.6 MMOL/L (ref 3.5–5.1)
PROT SERPL-MCNC: 5.4 G/DL (ref 6–8.4)
PROT UR QL STRIP: NEGATIVE
PROT UR QL STRIP: NEGATIVE
RA MAJOR: 4.58 CM
RA WIDTH: 3.44 CM
RBC # BLD AUTO: 3.57 M/UL (ref 4.6–6.2)
RBC #/AREA URNS AUTO: 47 /HPF (ref 0–4)
RBC #/AREA URNS AUTO: 9 /HPF (ref 0–4)
RIGHT VENTRICULAR END-DIASTOLIC DIMENSION: 3.2 CM
SAMPLE: ABNORMAL
SINUS: 3.4 CM
SITE: ABNORMAL
SODIUM SERPL-SCNC: 142 MMOL/L (ref 136–145)
SP GR UR STRIP: 1.01 (ref 1–1.03)
SP GR UR STRIP: 1.01 (ref 1–1.03)
SP02: 99
SQUAMOUS #/AREA URNS AUTO: 2 /HPF
SQUAMOUS #/AREA URNS AUTO: 6 /HPF
STJ: 3.18 CM
TDI LATERAL: 0.1 M/S
TDI SEPTAL: 0.08 M/S
TDI: 0.09 M/S
TRICUSPID ANNULAR PLANE SYSTOLIC EXCURSION: 1.66 CM
URN SPEC COLLECT METH UR: ABNORMAL
URN SPEC COLLECT METH UR: ABNORMAL
VT: 500
WBC # BLD AUTO: 8.28 K/UL (ref 3.9–12.7)
WBC #/AREA URNS AUTO: 32 /HPF (ref 0–5)
WBC #/AREA URNS AUTO: 74 /HPF (ref 0–5)

## 2023-05-25 PROCEDURE — 95714 VEEG EA 12-26 HR UNMNTR: CPT

## 2023-05-25 PROCEDURE — 99291 CRITICAL CARE FIRST HOUR: CPT | Mod: ,,, | Performed by: PSYCHIATRY & NEUROLOGY

## 2023-05-25 PROCEDURE — 36600 WITHDRAWAL OF ARTERIAL BLOOD: CPT

## 2023-05-25 PROCEDURE — 82550 ASSAY OF CK (CPK): CPT | Performed by: NURSE PRACTITIONER

## 2023-05-25 PROCEDURE — 25000003 PHARM REV CODE 250: Performed by: NURSE PRACTITIONER

## 2023-05-25 PROCEDURE — 95720 PR EEG, W/VIDEO, CONT RECORD, I&R, >12<26 HRS: ICD-10-PCS | Mod: ,,, | Performed by: PSYCHIATRY & NEUROLOGY

## 2023-05-25 PROCEDURE — 87040 BLOOD CULTURE FOR BACTERIA: CPT | Performed by: PHYSICIAN ASSISTANT

## 2023-05-25 PROCEDURE — 82803 BLOOD GASES ANY COMBINATION: CPT

## 2023-05-25 PROCEDURE — 20000000 HC ICU ROOM

## 2023-05-25 PROCEDURE — 80053 COMPREHEN METABOLIC PANEL: CPT | Performed by: NURSE PRACTITIONER

## 2023-05-25 PROCEDURE — 82140 ASSAY OF AMMONIA: CPT | Performed by: PSYCHIATRY & NEUROLOGY

## 2023-05-25 PROCEDURE — 83735 ASSAY OF MAGNESIUM: CPT | Performed by: NURSE PRACTITIONER

## 2023-05-25 PROCEDURE — 25000003 PHARM REV CODE 250: Performed by: STUDENT IN AN ORGANIZED HEALTH CARE EDUCATION/TRAINING PROGRAM

## 2023-05-25 PROCEDURE — 84100 ASSAY OF PHOSPHORUS: CPT | Performed by: NURSE PRACTITIONER

## 2023-05-25 PROCEDURE — 99900035 HC TECH TIME PER 15 MIN (STAT)

## 2023-05-25 PROCEDURE — 94003 VENT MGMT INPAT SUBQ DAY: CPT

## 2023-05-25 PROCEDURE — 27000221 HC OXYGEN, UP TO 24 HOURS

## 2023-05-25 PROCEDURE — 95720 EEG PHY/QHP EA INCR W/VEEG: CPT | Mod: ,,, | Performed by: PSYCHIATRY & NEUROLOGY

## 2023-05-25 PROCEDURE — 25000003 PHARM REV CODE 250: Performed by: PSYCHIATRY & NEUROLOGY

## 2023-05-25 PROCEDURE — 85025 COMPLETE CBC W/AUTO DIFF WBC: CPT | Performed by: NURSE PRACTITIONER

## 2023-05-25 PROCEDURE — 95700 EEG CONT REC W/VID EEG TECH: CPT

## 2023-05-25 PROCEDURE — 63600175 PHARM REV CODE 636 W HCPCS: Performed by: NURSE PRACTITIONER

## 2023-05-25 PROCEDURE — 99223 1ST HOSP IP/OBS HIGH 75: CPT | Mod: FS,,, | Performed by: PSYCHIATRY & NEUROLOGY

## 2023-05-25 PROCEDURE — C9254 INJECTION, LACOSAMIDE: HCPCS | Performed by: NURSE PRACTITIONER

## 2023-05-25 PROCEDURE — 99900026 HC AIRWAY MAINTENANCE (STAT)

## 2023-05-25 PROCEDURE — 81001 URINALYSIS AUTO W/SCOPE: CPT | Performed by: PHYSICIAN ASSISTANT

## 2023-05-25 PROCEDURE — 99291 PR CRITICAL CARE, E/M 30-74 MINUTES: ICD-10-PCS | Mod: ,,, | Performed by: PSYCHIATRY & NEUROLOGY

## 2023-05-25 PROCEDURE — 94761 N-INVAS EAR/PLS OXIMETRY MLT: CPT

## 2023-05-25 PROCEDURE — 99223 PR INITIAL HOSPITAL CARE,LEVL III: ICD-10-PCS | Mod: FS,,, | Performed by: PSYCHIATRY & NEUROLOGY

## 2023-05-25 RX ORDER — AMLODIPINE BESYLATE 10 MG/1
10 TABLET ORAL DAILY
Status: DISCONTINUED | OUTPATIENT
Start: 2023-05-25 | End: 2023-05-28

## 2023-05-25 RX ORDER — FENTANYL CITRATE-0.9 % NACL/PF 10 MCG/ML
0-200 PLASTIC BAG, INJECTION (ML) INTRAVENOUS CONTINUOUS
Status: DISCONTINUED | OUTPATIENT
Start: 2023-05-25 | End: 2023-05-26

## 2023-05-25 RX ORDER — NICARDIPINE HYDROCHLORIDE 0.2 MG/ML
0-15 INJECTION INTRAVENOUS CONTINUOUS
Status: DISCONTINUED | OUTPATIENT
Start: 2023-05-25 | End: 2023-05-27

## 2023-05-25 RX ORDER — LABETALOL HCL 20 MG/4 ML
10 SYRINGE (ML) INTRAVENOUS ONCE
Status: COMPLETED | OUTPATIENT
Start: 2023-05-25 | End: 2023-05-25

## 2023-05-25 RX ORDER — AMLODIPINE BESYLATE 10 MG/1
10 TABLET ORAL DAILY
Status: DISCONTINUED | OUTPATIENT
Start: 2023-05-25 | End: 2023-05-25

## 2023-05-25 RX ADMIN — LABETALOL HYDROCHLORIDE 10 MG: 5 INJECTION, SOLUTION INTRAVENOUS at 05:05

## 2023-05-25 RX ADMIN — POTASSIUM CHLORIDE 40 MEQ: 7.46 INJECTION, SOLUTION INTRAVENOUS at 05:05

## 2023-05-25 RX ADMIN — PROPOFOL 50 MCG/KG/MIN: 10 INJECTION, EMULSION INTRAVENOUS at 09:05

## 2023-05-25 RX ADMIN — LABETALOL HYDROCHLORIDE 10 MG: 5 INJECTION, SOLUTION INTRAVENOUS at 06:05

## 2023-05-25 RX ADMIN — HEPARIN SODIUM 5000 UNITS: 5000 INJECTION INTRAVENOUS; SUBCUTANEOUS at 10:05

## 2023-05-25 RX ADMIN — CLOBAZAM 20 MG: 2.5 SUSPENSION ORAL at 08:05

## 2023-05-25 RX ADMIN — POTASSIUM CHLORIDE 10 MEQ: 7.46 INJECTION, SOLUTION INTRAVENOUS at 08:05

## 2023-05-25 RX ADMIN — PROPOFOL 50 MCG/KG/MIN: 10 INJECTION, EMULSION INTRAVENOUS at 05:05

## 2023-05-25 RX ADMIN — LACOSAMIDE 200 MG: 10 INJECTION INTRAVENOUS at 04:05

## 2023-05-25 RX ADMIN — FAMOTIDINE 20 MG: 20 TABLET ORAL at 08:05

## 2023-05-25 RX ADMIN — HEPARIN SODIUM 5000 UNITS: 5000 INJECTION INTRAVENOUS; SUBCUTANEOUS at 05:05

## 2023-05-25 RX ADMIN — AMLODIPINE BESYLATE 10 MG: 10 TABLET ORAL at 05:05

## 2023-05-25 RX ADMIN — HEPARIN SODIUM 5000 UNITS: 5000 INJECTION INTRAVENOUS; SUBCUTANEOUS at 02:05

## 2023-05-25 RX ADMIN — LEVETIRACETAM 2000 MG: 100 INJECTION, SOLUTION INTRAVENOUS at 08:05

## 2023-05-25 RX ADMIN — PROPOFOL 50 MCG/KG/MIN: 10 INJECTION, EMULSION INTRAVENOUS at 02:05

## 2023-05-25 RX ADMIN — Medication 50 MCG/HR: at 07:05

## 2023-05-25 RX ADMIN — MIDAZOLAM HYDROCHLORIDE 5 MG/HR: 1 INJECTION, SOLUTION INTRAVENOUS at 04:05

## 2023-05-25 RX ADMIN — SODIUM CHLORIDE, POTASSIUM CHLORIDE, SODIUM LACTATE AND CALCIUM CHLORIDE: 600; 310; 30; 20 INJECTION, SOLUTION INTRAVENOUS at 11:05

## 2023-05-25 RX ADMIN — NICARDIPINE HYDROCHLORIDE 2.5 MG/HR: 0.2 INJECTION, SOLUTION INTRAVENOUS at 07:05

## 2023-05-25 RX ADMIN — MAGNESIUM SULFATE 2 G: 2 INJECTION INTRAVENOUS at 05:05

## 2023-05-25 RX ADMIN — ACETAMINOPHEN 650 MG: 325 TABLET ORAL at 08:05

## 2023-05-25 RX ADMIN — HEPARIN SODIUM 5000 UNITS: 5000 INJECTION INTRAVENOUS; SUBCUTANEOUS at 12:05

## 2023-05-25 NOTE — CONSULTS
Inpatient consult to Physical Medicine Rehab  Consult performed by: Makenzie Vincent NP  Consult ordered by: Kayleigh Zelaya NP  Reason for consult: Assess rehab needs    Reviewed patient history and current admission.  Rehab team following.  Full consult to follow.    PHILOMENA Boswell, FNP-C  Physical Medicine & Rehabilitation   05/25/2023

## 2023-05-25 NOTE — PT/OT/SLP PROGRESS
Occupational Therapy      Patient Name:  Yeison Andrade   MRN:  [id number]    OT orders received. Pt remains intubated and sedated and failed MOVE screen. OT to check status at later date to initiate therapy services when appropriate.   5/25/2023

## 2023-05-25 NOTE — PLAN OF CARE
[facility] Card[facility] Medical [facility]  Initial Discharge Assessment       Primary Care Provider: Primary Doctor No    Admission Diagnosis: Status epilepticus [G40.901]    Admission Date: 2023  Expected Discharge Date: 2023    Transition of Care Barriers: Underinsured    Payor: MEDICAID / Plan: [place]UE [place] / Product Type: Managed Medicaid /     Extended Emergency Contact Information  Primary Emergency Contact: Sachi Andrade  Mobile Phone: [phone]  Relation: Mother  Secondary Emergency Contact: Edwin Proctor  Mobile Phone: [phone]  Relation: Relative   needed? No    Discharge Plan A: Rehab  Discharge Plan B: Long-term acute care facility (LTAC)      Patient transferred to [facility] from [facility]. Discharge Planning Assessment was completed at [facility] by ANTONINA Haywood as per below.  CM phoned the patient's motherSachi [phone] via phone to verify the assessment.   Msg left on mother's V/M to call CM back.     Per assessment at [facility]:   The pt lives in [place] with Edwin Proctor([phone]/Efrem Proctor(cousin)[phone] in their  grandfather's home. The pt was living with his mother in [place] but moved back about one month ago. The pt is independent with his adl's and doesn't use dme. The pt had brain surgery 2020 and has a hx of having seizures and frequent hospital admits. She states the pt was receiving medical care at [facility] in [place] prior to moving to [place].The pt's mother doesn't think the pt's compliant with his seizure medications and smokes marijuana daily. She states the pt is very forgetful. The pt drives but his mother states Edwin will transport the pt home at d/c.        Initial Assessment (most recent)       Adult Discharge Assessment - 23 1422          Discharge Assessment    Assessment Type Discharge Planning Assessment     Confirmed/corrected address, phone number and insurance Yes     Confirmed  Demographics Correct on Facesheet     Source of Information health record     If unable to respond/provide information was family/caregiver contacted? Yes     Communicated USHA with patient/caregiver Date not available/Unable to determine     Reason For Admission Status epilepticus     People in Home other relative(s)     Who are your caregiver(s) and their phone number(s)? Status epilepticus     Prior to hospitilization cognitive status: Alert/Oriented     Current cognitive status: Coma/Sedated/Intubated     Walking or Climbing Stairs --   independent    Dressing/Bathing --   independent    Equipment Currently Used at Home none     Readmission within 30 days? No     Do you currently have service(s) that help you manage your care at home? No     Do you take prescription medications? Yes     Do you have prescription coverage? Yes     Coverage Healthy Blue     Do you have any problems affording any of your prescribed medications? No     Is the patient taking medications as prescribed? yes     Who is going to help you get home at discharge? Edwin Proctor (cousin) [phone], Efrem Hitesh (cousin) [phone]     How do you get to doctors appointments? family or friend will provide;car, drives self     Are you on dialysis? No     Do you take coumadin? No     Discharge Plan A Rehab     Discharge Plan B Long-term acute care facility (LTAC)     DME Needed Upon Discharge  none     Transition of Care Barriers Underinsured        Physical Activity    On average, how many days per week do you engage in moderate to strenuous exercise (like a brisk walk)? --   Intubated/CORY    On average, how many minutes do you engage in exercise at this level? --   Intubated/CORY       Financial Resource Strain    How hard is it for you to pay for the very basics like food, housing, medical care, and heating? --   Intubated/CORY       Housing Stability    In the last 12 months, was there a time when you did not have a steady place to sleep or  slept in a shelter (including now)? --   Intubated/CORY       Transportation Needs    In the past 12 months, has lack of transportation kept you from medical appointments or from getting medications? --   Intubated/CORY    In the past 12 months, has lack of transportation kept you from meetings, work, or from getting things needed for daily living? --   Intubated/CORY       Food Insecurity    Within the past 12 months, you worried that your food would run out before you got the money to buy more. --   Intubated/CORY    Within the past 12 months, the food you bought just didn't last and you didn't have money to get more. --   Intubated/CORY       Stress    Do you feel stress - tense, restless, nervous, or anxious, or unable to sleep at night because your mind is troubled all the time - these days? --   Intubated/CORY       Social Connections    In a typical week, how many times do you talk on the phone with family, friends, or neighbors? --   Intubated/CORY    How often do you get together with friends or relatives? --   Intubated/CORY    How often do you attend Baptist or Orthodox services? --   Intubated/CORY    How often do you attend meetings of the clubs or organizations you belong to? --   Intubated/CORY    Are you , , , , never , or living with a partner? --   Intubated/CORY       Alcohol Use    Q1: How often do you have a drink containing alcohol? --   Intubated/CORY    Q2: How many drinks containing alcohol do you have on a typical day when you are drinking? --   Intubated/CORY    Q3: How often do you have six or more drinks on one occasion? --   Intubated/CORY       OTHER    Name(s) of People in Home Edwin Proctor(cousin)[phone]/Efrem Proctor(cousin)[phone]                        Shima Kruse RN, CCRN-K, [facility]  Neuro-Critical Care   [phone]

## 2023-05-25 NOTE — CONSULTS
[facility] - Card[facility] Medical [facility]  Neurology-Epilepsy  Consult Note    Patient Name: Yeison Andrade  MRN: [id number]  Admission Date: 5/24/2023  Hospital Length of Stay: 1 days  Code Status: Full Code   Attending Provider: Franky Proctor DO   Consulting Provider: Ilana Beltrán PA-C  Primary Care Physician: Primary Doctor No  Principal Problem:Status epilepticus    Consults   Subjective:     HPI:   Mr. Andrade is a 38 year old man with significant past medical history of prior TBI with craniectomy in 2020, Bipolar disorder, seizure disorder admitted to [facility] as transfer from [facility] for management of status epilepticus. Per chart, patient was brought to OSH by a friend with witnessed seizures, admitted initially to medicine for management and continued on home Lacosamide 50 mg BID. Patient had intermittent seizures at OSH prompting transfer to ICU, where Lacosamide increased and Levetiracetam added. EEG 5/22 and 5/23 showed focal electrographic status in the right hemisphere with recurrent electrographic seizures in this reason. Patient ultimately intubated, sedated on Propofol 50 mKm and Midazolam 5 mg/hr, transfer initiated to [facility] for higher level of care. EEG initiated at [facility] 5/25 showing breach pattern over the right, intermittent right sided discharges at times with poly spike morphology, no discrete seizures. Neurology Epilepsy following for assistance in management.      Hospital Course:   No notes on file     Past Medical History:   Diagnosis Date    Hypertension     Seizures        No past surgical history on file.    Review of patient's allergies indicates:  No Known Allergies    No current facility-administered medications on file prior to encounter.     Current Outpatient Medications on File Prior to Encounter   Medication Sig    amLODIPine (NORVASC) 10 MG tablet Take 10 mg by mouth once daily.    lacosamide (VIMPAT) 50 mg Tab Take 100 mg by mouth 2 (two) times a day.    lisinopriL (PRINIVIL,ZESTRIL) 5  MG tablet Take 5 mg by mouth once daily.     Continuous Infusions:   lactated ringers 200 mL/hr at 05/25/23 0120    midazolam 5 mg/hr (05/25/23 0600)    propofol 50 mcg/kg/min (05/25/23 1501)       Family History    None       Tobacco Use    Smoking status: Not on file    Smokeless tobacco: Not on file   Substance and Sexual Activity    Alcohol use: Not on file    Drug use: Not on file    Sexual activity: Not on file     Review of Systems   Unable to perform ROS: Intubated   Objective:     Vital Signs (Most Recent):  Temp: 98.6 °F (37 °C) (05/25/23 1500)  Pulse: 77 (05/25/23 1500)  Resp: 17 (05/25/23 1500)  BP: (!) 195/98 (05/25/23 1500)  SpO2: 100 % (05/25/23 1500) Vital Signs (24h Range):  Temp:  [96.6 °F (35.9 °C)-98.7 °F (37.1 °C)] 98.6 °F (37 °C)  Pulse:  [61-77] 77  Resp:  [16-66] 17  SpO2:  [100 %] 100 %  BP: (104-195)/(74-98) 195/98     Weight: 68.5 kg (151 lb)  Body mass index is 20.48 kg/m².     Physical Exam  Constitutional:       Appearance: He is not toxic-appearing or diaphoretic.   HENT:      Head: Normocephalic.      Comments: EEG in place  Eyes:      General: No scleral icterus.     Pupils: Pupils are equal, round, and reactive to light.   Pulmonary:      Effort: Pulmonary effort is normal. No respiratory distress.   Abdominal:      General: There is no distension.      Palpations: Abdomen is soft.   Musculoskeletal:         General: No deformity or signs of injury.      Cervical back: Neck supple. No rigidity.   Skin:     General: Skin is dry.      Coloration: Skin is not jaundiced.   Psychiatric:      Comments: Unable to assess          NEUROLOGICAL EXAMINATION:     CRANIAL NERVES     CN III, IV, VI   Pupils are equal, round, and reactive to light.       Comatose   CN:   No blink to threat OU   SMITA OU   Corneal weak OU   + cough  No spontaneous eye opening   Face symmetric   No withdrawal to noxious stimuli in extremities    Exam findings to suggest seizures:  Myoclonus - no   eye  twitching - no   Nystagmus - no   gaze deviation - no   waxy rigidity - no       Significant Labs: All pertinent lab results from the past 24 hours have been reviewed.    Significant Studies: I have reviewed all pertinent imaging results/findings within the past 24 hours.    Assessment and Plan:     * Status epilepticus  38M PMHx of SDH/TBI s/p craniectomy 12/2020, Bipolar disorder, HTN, and known history of seizure with reported medication noncompliance presented to [facility] after witnessed sz, EEG at OSH with focal right status. Transferred to [facility] 5/24 for higher level of care.    Recommendations:  - Continuous vEEG monitoring - preliminary read 5/25 with no discrete seizures, intermittent right sided discharges at times with polyspike morphology  - Agree with Midazolam discontinuation 5/25  - Recommend to increase Clobazam to 20 mg BID  - Currently on Propofol 50 mKm  - Continue Lacosamide 200 mg BID, Levetiracetam 2000 mg BID  - Avoid agents that lower seizure threshold  - Management of any acute infectious/metabolic abnormalities per [facility]  - Seizure precautions      Discussed plan of care with [facility] team, no family available at bedside. Will follow, please call with any questions.     Non-traumatic rhabdomyolysis  - In setting of recurrent seizures, CPK peaked 5/23 and trending down since that time  - Management per [facility], on LR    On mechanically assisted ventilation  - Intubated at OSH  - Vent management per [facility], daily CXR/ABG    ALONDRA (acute kidney injury)  - Cr elevated to 2.3 on OSH presentation, trending down since admission  - Management per [facility]    HTN (hypertension)  - Hx of, vitals reviewed, management per [facility]        VTE Risk Mitigation (From admission, onward)         Ordered     heparin (porcine) injection 5,000 Units  Every 8 hours         05/24/23 1630     IP VTE LOW RISK PATIENT  Once         05/24/23 1523     Place sequential compression device  Until discontinued         05/24/23 1523                 Thank you for your consult. I will follow-up with patient. Please contact us if you have any additional questions.    Ilana Beltrán PA-C  Neurology-Epilepsy  [facility] - Card[facility] Medical [facility]  Staff: Dr. Baptiste

## 2023-05-25 NOTE — CONSULTS
[facility] - Cardiac Medical ICU  Adult Nutrition  Consult Note    SUMMARY     Recommendations    If propofol continue at current rate, rec'd Impact peptide 1.5 @ 40ml/hr to provide 1440 kcal, 90g protein, 739ml FW. Propofol @ 20.6ml/hr provide additional 544 kcal    If propofol d/c, Increase TF rate to Impact peptide 1.5 @ 50ml/hr to provide 1800 kcal, 113g protein, 924ml FW.     When medically able, ADAT regular diet with texture per SLP    RD to monitor and follow    Goals: Meet % EEN, EPN by RD f/u  Nutrition Goal Status: new  Communication of RD Recs:  (POC)    Assessment and Plan    Nutrition Problem:  Inadequate energy intake     Related to (etiology):   Inability to consume sufficient energy      Signs and Symptoms (as evidenced by):   NPO     Interventions/Recommendations (treatment strategy):  Collaboration with providers  EN     Nutrition Diagnosis Status:   New      Reason for Assessment    Reason For Assessment: consult  Diagnosis:  (status epilepticus)  Relevant Medical History: seizure, HTN, SKI  Interdisciplinary Rounds: did not attend    General Information Comments:   Pt is intubated/sedated. No family at bedside. No nutrition ordered at this time. OGT and PICC line in place. Propofol @ 20.6ml/hr providing 544 kcal. No recent wt record available. Unable to determine nutritional status at this time. RD following.     Nutrition Discharge Planning: Pending medical course    Anthropometrics    Temp: 98.7 °F (37.1 °C)  Height Method: Estimated  Height: 6' (182.9 cm)  Height (inches): 72 in  Weight: 68.5 kg (151 lb)  Weight (lb): 151 lb  Ideal Body Weight (IBW), Male: 178 lb  % Ideal Body Weight, Male (lb): 84.83 %  BMI (Calculated): 20.5     Lab/Procedures/Meds    Pertinent Labs Reviewed: reviewed  Pertinent Labs Comments: albumin 2.7, ALP 48, , ALT 59  Pertinent Medications Reviewed: reviewed  Pertinent Medications Comments: heparin, famotidine, propofol, lactated  ringer    Estimated/Assessed Needs    Weight Used For Calorie Calculations: 68.5 kg (151 lb)  Energy Calorie Requirements (kcal): 1803  Energy Need Method: [facility]  Protein Requirements: 82-102g (1.2-1.5g/kg)  Weight Used For Protein Calculations: 68.5 kg (151 lb)  Fluid Requirements (mL): 1ml/kcal or per MD  RDA Method (mL): 1803     Nutrition Prescription Ordered    Current Diet Order: NPO    Evaluation of Received Nutrient/Fluid Intake    I/O: - 0.4 L since admit  Energy Calories Required: not meeting needs  Protein Required: not meeting needs  Comments: LBM 5/25  Tolerance: tolerating    Nutrition Risk    Level of Risk/Frequency of Follow-up:  (1x/week)     Monitor and Evaluation    Food and Nutrient Intake: energy intake, food and beverage intake, enteral nutrition intake  Food and Nutrient Adminstration: diet order, enteral and parenteral nutrition administration  Knowledge/Beliefs/Attitudes: food and nutrition knowledge/skill  Physical Activity and Function: nutrition-related ADLs and IADLs  Anthropometric Measurements: height/length, weight, weight change, body mass index  Biochemical Data, Medical Tests and Procedures: electrolyte and renal panel, gastrointestinal profile, glucose/endocrine profile, inflammatory profile, lipid profile  Nutrition-Focused Physical Findings: overall appearance     Nutrition Follow-Up    RD Follow-up?: Yes

## 2023-05-25 NOTE — PLAN OF CARE
CMICU DAILY GOALS       A: Awake    RASS: Goal - RASS Goal: -3-->moderate sedation  Actual - RASS ([place] Agitation-Sedation Scale): moderate sedation   Restraint necessity:    B: Breathe   SBT: Not attempted   C: Coordinate A & B, analgesics/sedatives   Pain: managed    SAT: Not attempted  D: Delirium   CAM-ICU: Overall CAM-ICU: Positive  E: Early(intubated/ Progressive (non-intubated) Mobility   MOVE Screen: Fail   Activity: Activity Management: Patient unable to perform activities  FAS: Feeding/Nutrition   Diet order: Diet/Nutrition Received: NPO,    T: Thrombus   DVT prophylaxis: VTE Required Core Measure: Pharmacological prophylaxis initiated/maintained  H: HOB Elevation   Head of Bed (HOB) Positioning: HOB at 45 degrees  U: Ulcer Prophylaxis   GI: yes  G: Glucose control   managed    S: Skin   Bathing/Skin Care: bath, complete  Device Skin Pressure Protection: adhesive use limited  Pressure Reduction Devices: pressure-redistributing mattress utilized  Pressure Reduction Techniques: pressure points protected  Skin Protection: adhesive use limited  B: Bowel Function   diarrhea   I: Indwelling Catheters   Garcia necessity:      Urethral Catheter 05/24/23 1810-Reason for Continuing Urinary Catheterization: Critically ill in ICU and requiring hourly monitoring of intake/output  [REMOVED]      Urethral Catheter 05/23/23 1430-Reason for Continuing Urinary Catheterization: Urinary retention, Non-healing sacral/perineal wound, Required immobilization, Hospice/Comfort Care/Palliative Care, Critically ill in ICU and requiring hourly monitoring of intake/output, Chronic Indwelling Urinary Catheter on Admission, Placed by Urology Service   CVC necessity: No  D: De-escalation Antibiotics   No    Family/Goals of care/Code Status   Code Status: Full Code    24H Vital Sign Range  Temp:  [96.6 °F (35.9 °C)-98.7 °F (37.1 °C)]   Pulse:  [61-83]   Resp:  [16-66]   BP: (104-195)/()   SpO2:  [100 %]      Shift Events   Versed  infusion weaned off. VSS.     VS and assessment per flow sheet, patient progressing towards goals as tolerated, plan of care reviewed with family, all concerns addressed, will continue to monitor.    Gregorio Joseph

## 2023-05-25 NOTE — PT/OT/SLP PROGRESS
Speech Language Pathology  Discharge    Yeison Andrade  MRN: [id number]    Patient not seen today secondary to Other (Pt currently intubated). Please re-consult once pt extubated and appropriate for SLP services.

## 2023-05-25 NOTE — HPI
Mr. Andrade is a 38 year old man with significant past medical history of prior TBI with craniectomy in 2020, Bipolar disorder, seizure disorder admitted to [facility] as transfer from [facility] for management of status epilepticus. Per chart, patient was brought to OSH by a friend with witnessed seizures, admitted initially to medicine for management and continued on home Lacosamide 50 mg BID. Patient had intermittent seizures at OSH prompting transfer to ICU, where Lacosamide increased and Levetiracetam added. EEG 5/22 and 5/23 showed focal electrographic status in the right hemisphere with recurrent electrographic seizures in this reason. Patient ultimately intubated, sedated on Propofol 50 mKm and Midazolam 5 mg/hr, transfer initiated to [facility] for higher level of care. EEG initiated at [facility] 5/25 showing breach pattern over the right, intermittent right sided discharges at times with poly spike morphology, no discrete seizures. Neurology Epilepsy following for assistance in management.

## 2023-05-25 NOTE — PROGRESS NOTES
[facility] - Cardiac Medical ICU  Neurocritical Care  Progress Note    Admit Date: 5/24/2023  Service Date: 05/25/2023  Length of Stay: 1    Subjective:     Chief Complaint: Status epilepticus    History of Present Illness:  37 yo M w/ PMHx of SDH/TBI s/p craniectomy 12/2020, Bipolar disorder, HTN, and known history of seizure presented to [facility] ED on 5/22/23 without identifiable information after witnessed seizure. Was naked on presentation and incontinent of urine. Pt could not be identifed on presentation, was brought in by friend who also did not provide any information. Was given 10 mg Versed IM via EMS. Was obtunded and minimally responsive to painful stimuli.Pt intubated and sedated 5/23/23 in preparation for transfer to Neuro ICU. Had breakthrough seizure overnight despite sedation on Propofol and Versed, requiring Ativan push. EEG cap restarted this morning, discussed with Epileptologist at [facility]. Spot EEG unable to be obtained as technician has limited availability at [facility]. Admitted to [facility] for continuous EEG and neuro monitoring.     05/25/2023: NAEON, Wean sedation (Versed 1st, then once off, slowly wean propofol off) while monitoring EEG, continue AED. Updated patient's mother over the phone about the updates and plan.     Review of Systems   Reason unable to perform ROS: decrease LOC.   Objective:      Vitals:  Vitals:    05/25/23 1000 05/25/23 1100 05/25/23 1200 05/25/23 1300   BP: (!) 174/98 (!) 161/87 (!) 163/93 (!) 174/89   BP Location:  Left arm     Patient Position:  Lying     Pulse: 67 67 66 71   Resp: 16 16 16 16   Temp:  98.7 °F (37.1 °C)     TempSrc:  Oral     SpO2: 100% 100% 100% 100%   Weight:       Height:   6' (1.829 m)              Physical Exam  Constitutional:       Appearance: He is ill-appearing.   HENT:      Head: Normocephalic.      Nose: Nose normal.      Mouth/Throat:      Mouth: Mucous membranes are moist.      Pharynx: Oropharynx is clear.   Eyes:      Pupils:  Pupils are equal, round, and reactive to light.   Cardiovascular:      Rate and Rhythm: Normal rate and regular rhythm.      Pulses: Normal pulses.      Heart sounds: Normal heart sounds.   Pulmonary:      Effort: Pulmonary effort is normal.      Breath sounds: Normal breath sounds.   Abdominal:      General: Bowel sounds are normal.      Palpations: Abdomen is soft.   Musculoskeletal:         General: Normal range of motion.   Skin:     General: Skin is warm and dry.      Capillary Refill: Capillary refill takes 2 to 3 seconds.   Neurological:   GCS 3  PERRL  Minimal WD to pain X4  Sensation Intact       Unable to test orientation, language, memory, judgment, insight, fund of knowledge, hearing, shoulder shrug, tongue protrusion, coordination, gait due to level of consciousness.        Today I personally reviewed pertinent medications, lines/drains/airways, imaging, cardiology results, laboratory results, microbiology results         Assessment/Plan:      Neuro  * Status epilepticus  - admit to [facility] r/o status  - continuous EEG  - Non-titrating propofol  - Non-titrating versed   - Resume Vimpat 200 mg BID  - Keppra 2000 mg BID  - Continue Onfi 20 mg daily  - Vimpat level 2.0     Seizure  Hx of     Pulmonary  On mechanically assisted ventilation  - CXR daily  - ABG daily     Cardiac/Vascular  HTN (hypertension)  Hx of     Orthopedic  Non-traumatic rhabdomyolysis  - Ck trending down  - daily Ck  -  ml/hr      The patient is being Prophylaxed for:  Venous Thromboembolism with: Mechanical or Chemical  Stress Ulcer with: H2B  Ventilator Pneumonia with: chlorhexidine oral care    Activity Orders            Turn patient starting at 05/24 1600    Elevate HOB starting at 05/24 1519    Diet NPO: NPO starting at 05/24 1519          Full Code    Rodger Marie MD  Neurocritical Care  [facility] - Cardiac Medical ICU

## 2023-05-25 NOTE — PLAN OF CARE
Recommendations    If propofol continue at current rate, rec'd Impact peptide 1.5 @ 40ml/hr to provide 1440 kcal, 90g protein, 739ml FW. Propofol @ 20.6ml/hr provide additional 544 kcal    If propofol d/c, Increase TF rate to Impact peptide 1.5 @ 50ml/hr to provide 1800 kcal, 113g protein, 924ml FW.     When medically able, ADAT regular diet with texture per SLP    RD to monitor and follow    Goals: Meet % EEN, EPN by RD f/u  Nutrition Goal Status: new  Communication of RD Recs:  (POC)   no

## 2023-05-25 NOTE — SUBJECTIVE & OBJECTIVE
Past Medical History:   Diagnosis Date    Hypertension     Seizures        No past surgical history on file.    Review of patient's allergies indicates:  No Known Allergies    No current facility-administered medications on file prior to encounter.     Current Outpatient Medications on File Prior to Encounter   Medication Sig    amLODIPine (NORVASC) 10 MG tablet Take 10 mg by mouth once daily.    lacosamide (VIMPAT) 50 mg Tab Take 100 mg by mouth 2 (two) times a day.    lisinopriL (PRINIVIL,ZESTRIL) 5 MG tablet Take 5 mg by mouth once daily.     Continuous Infusions:   lactated ringers 200 mL/hr at 05/25/23 0120    midazolam 5 mg/hr (05/25/23 0600)    propofol 50 mcg/kg/min (05/25/23 1501)       Family History    None       Tobacco Use    Smoking status: Not on file    Smokeless tobacco: Not on file   Substance and Sexual Activity    Alcohol use: Not on file    Drug use: Not on file    Sexual activity: Not on file     Review of Systems   Unable to perform ROS: Intubated   Objective:     Vital Signs (Most Recent):  Temp: 98.6 °F (37 °C) (05/25/23 1500)  Pulse: 77 (05/25/23 1500)  Resp: 17 (05/25/23 1500)  BP: (!) 195/98 (05/25/23 1500)  SpO2: 100 % (05/25/23 1500) Vital Signs (24h Range):  Temp:  [96.6 °F (35.9 °C)-98.7 °F (37.1 °C)] 98.6 °F (37 °C)  Pulse:  [61-77] 77  Resp:  [16-66] 17  SpO2:  [100 %] 100 %  BP: (104-195)/(74-98) 195/98     Weight: 68.5 kg (151 lb)  Body mass index is 20.48 kg/m².     Physical Exam  Constitutional:       Appearance: He is not toxic-appearing or diaphoretic.   HENT:      Head: Normocephalic.      Comments: EEG in place  Eyes:      General: No scleral icterus.     Pupils: Pupils are equal, round, and reactive to light.   Pulmonary:      Effort: Pulmonary effort is normal. No respiratory distress.   Abdominal:      General: There is no distension.      Palpations: Abdomen is soft.   Musculoskeletal:         General: No deformity or signs of injury.      Cervical back: Neck supple. No  rigidity.   Skin:     General: Skin is dry.      Coloration: Skin is not jaundiced.   Psychiatric:      Comments: Unable to assess          NEUROLOGICAL EXAMINATION:     CRANIAL NERVES     CN III, IV, VI   Pupils are equal, round, and reactive to light.       Comatose   CN:   No blink to threat OU   SMITA OU   Corneal weak OU   + cough  No spontaneous eye opening   Face symmetric   No withdrawal to noxious stimuli in extremities    Exam findings to suggest seizures:  Myoclonus - no   eye twitching - no   Nystagmus - no   gaze deviation - no   waxy rigidity - no       Significant Labs: All pertinent lab results from the past 24 hours have been reviewed.    Significant Studies: I have reviewed all pertinent imaging results/findings within the past 24 hours.

## 2023-05-25 NOTE — PT/OT/SLP PROGRESS
Physical Therapy      Patient Name:  Yeison Andrade   MRN:  [id number]    Patient not seen today. Pt failed MOVE screen. Will follow-up when appropriate.

## 2023-05-25 NOTE — ASSESSMENT & PLAN NOTE
- In setting of recurrent seizures, CPK peaked 5/23 and trending down since that time  - Management per NCC, on LR

## 2023-05-26 LAB
ALBUMIN SERPL BCP-MCNC: 2.7 G/DL (ref 3.5–5.2)
ALLENS TEST: ABNORMAL
ALP SERPL-CCNC: 61 U/L (ref 55–135)
ALT SERPL W/O P-5'-P-CCNC: 57 U/L (ref 10–44)
ANION GAP SERPL CALC-SCNC: 12 MMOL/L (ref 8–16)
AST SERPL-CCNC: 100 U/L (ref 10–40)
BACTERIA UR CULT: NO GROWTH
BASOPHILS # BLD AUTO: 0.03 K/UL (ref 0–0.2)
BASOPHILS NFR BLD: 0.2 % (ref 0–1.9)
BILIRUB SERPL-MCNC: 0.7 MG/DL (ref 0.1–1)
BUN SERPL-MCNC: 9 MG/DL (ref 6–20)
CALCIUM SERPL-MCNC: 8.6 MG/DL (ref 8.7–10.5)
CHLORIDE SERPL-SCNC: 106 MMOL/L (ref 95–110)
CO2 SERPL-SCNC: 24 MMOL/L (ref 23–29)
CREAT SERPL-MCNC: 1 MG/DL (ref 0.5–1.4)
DELSYS: ABNORMAL
DIFFERENTIAL METHOD: ABNORMAL
EOSINOPHIL # BLD AUTO: 0.2 K/UL (ref 0–0.5)
EOSINOPHIL NFR BLD: 1.3 % (ref 0–8)
ERYTHROCYTE [DISTWIDTH] IN BLOOD BY AUTOMATED COUNT: 12.7 % (ref 11.5–14.5)
ERYTHROCYTE [SEDIMENTATION RATE] IN BLOOD BY WESTERGREN METHOD: 16 MM/H
EST. GFR  (NO RACE VARIABLE): >60 ML/MIN/1.73 M^2
FIO2: 24
GLUCOSE SERPL-MCNC: 87 MG/DL (ref 70–110)
HCO3 UR-SCNC: 25.8 MMOL/L (ref 24–28)
HCT VFR BLD AUTO: 33.6 % (ref 40–54)
HGB BLD-MCNC: 10.8 G/DL (ref 14–18)
IMM GRANULOCYTES # BLD AUTO: 0.05 K/UL (ref 0–0.04)
IMM GRANULOCYTES NFR BLD AUTO: 0.4 % (ref 0–0.5)
LYMPHOCYTES # BLD AUTO: 1.9 K/UL (ref 1–4.8)
LYMPHOCYTES NFR BLD: 13.3 % (ref 18–48)
MAGNESIUM SERPL-MCNC: 1.4 MG/DL (ref 1.6–2.6)
MCH RBC QN AUTO: 29.2 PG (ref 27–31)
MCHC RBC AUTO-ENTMCNC: 32.1 G/DL (ref 32–36)
MCV RBC AUTO: 91 FL (ref 82–98)
MODE: ABNORMAL
MONOCYTES # BLD AUTO: 0.7 K/UL (ref 0.3–1)
MONOCYTES NFR BLD: 4.8 % (ref 4–15)
NEUTROPHILS # BLD AUTO: 11.4 K/UL (ref 1.8–7.7)
NEUTROPHILS NFR BLD: 80 % (ref 38–73)
NRBC BLD-RTO: 0 /100 WBC
PCO2 BLDA: 34 MMHG (ref 35–45)
PEEP: 5
PH SMN: 7.49 [PH] (ref 7.35–7.45)
PHOSPHATE SERPL-MCNC: 4.7 MG/DL (ref 2.7–4.5)
PLATELET # BLD AUTO: 197 K/UL (ref 150–450)
PMV BLD AUTO: 10.4 FL (ref 9.2–12.9)
PO2 BLDA: 122 MMHG (ref 80–100)
POC BE: 2 MMOL/L
POC SATURATED O2: 99 % (ref 95–100)
POC TCO2: 27 MMOL/L (ref 23–27)
POCT GLUCOSE: 77 MG/DL (ref 70–110)
POTASSIUM SERPL-SCNC: 3.9 MMOL/L (ref 3.5–5.1)
PROT SERPL-MCNC: 5.6 G/DL (ref 6–8.4)
RBC # BLD AUTO: 3.7 M/UL (ref 4.6–6.2)
SAMPLE: ABNORMAL
SITE: ABNORMAL
SODIUM SERPL-SCNC: 142 MMOL/L (ref 136–145)
SP02: 100
VT: 500
WBC # BLD AUTO: 14.19 K/UL (ref 3.9–12.7)

## 2023-05-26 PROCEDURE — 95720 PR EEG, W/VIDEO, CONT RECORD, I&R, >12<26 HRS: ICD-10-PCS | Mod: ,,, | Performed by: PSYCHIATRY & NEUROLOGY

## 2023-05-26 PROCEDURE — 63600175 PHARM REV CODE 636 W HCPCS: Performed by: PSYCHIATRY & NEUROLOGY

## 2023-05-26 PROCEDURE — 99900026 HC AIRWAY MAINTENANCE (STAT)

## 2023-05-26 PROCEDURE — 82803 BLOOD GASES ANY COMBINATION: CPT

## 2023-05-26 PROCEDURE — 63600175 PHARM REV CODE 636 W HCPCS: Performed by: PHYSICIAN ASSISTANT

## 2023-05-26 PROCEDURE — C9254 INJECTION, LACOSAMIDE: HCPCS | Performed by: NURSE PRACTITIONER

## 2023-05-26 PROCEDURE — 36600 WITHDRAWAL OF ARTERIAL BLOOD: CPT

## 2023-05-26 PROCEDURE — 99291 CRITICAL CARE FIRST HOUR: CPT | Mod: ,,, | Performed by: PSYCHIATRY & NEUROLOGY

## 2023-05-26 PROCEDURE — 99233 PR SUBSEQUENT HOSPITAL CARE,LEVL III: ICD-10-PCS | Mod: FS,,, | Performed by: PSYCHIATRY & NEUROLOGY

## 2023-05-26 PROCEDURE — 27000221 HC OXYGEN, UP TO 24 HOURS

## 2023-05-26 PROCEDURE — 95714 VEEG EA 12-26 HR UNMNTR: CPT

## 2023-05-26 PROCEDURE — 83735 ASSAY OF MAGNESIUM: CPT | Performed by: NURSE PRACTITIONER

## 2023-05-26 PROCEDURE — 94003 VENT MGMT INPAT SUBQ DAY: CPT

## 2023-05-26 PROCEDURE — 25000003 PHARM REV CODE 250: Performed by: STUDENT IN AN ORGANIZED HEALTH CARE EDUCATION/TRAINING PROGRAM

## 2023-05-26 PROCEDURE — 25000003 PHARM REV CODE 250: Performed by: PSYCHIATRY & NEUROLOGY

## 2023-05-26 PROCEDURE — 99233 SBSQ HOSP IP/OBS HIGH 50: CPT | Mod: FS,,, | Performed by: PSYCHIATRY & NEUROLOGY

## 2023-05-26 PROCEDURE — 87186 SC STD MICRODIL/AGAR DIL: CPT | Performed by: PHYSICIAN ASSISTANT

## 2023-05-26 PROCEDURE — 99900035 HC TECH TIME PER 15 MIN (STAT)

## 2023-05-26 PROCEDURE — 25000003 PHARM REV CODE 250

## 2023-05-26 PROCEDURE — 87077 CULTURE AEROBIC IDENTIFY: CPT | Performed by: PHYSICIAN ASSISTANT

## 2023-05-26 PROCEDURE — 99291 PR CRITICAL CARE, E/M 30-74 MINUTES: ICD-10-PCS | Mod: ,,, | Performed by: PSYCHIATRY & NEUROLOGY

## 2023-05-26 PROCEDURE — 20000000 HC ICU ROOM

## 2023-05-26 PROCEDURE — 87070 CULTURE OTHR SPECIMN AEROBIC: CPT | Performed by: PHYSICIAN ASSISTANT

## 2023-05-26 PROCEDURE — 25000003 PHARM REV CODE 250: Performed by: NURSE PRACTITIONER

## 2023-05-26 PROCEDURE — 51702 INSERT TEMP BLADDER CATH: CPT

## 2023-05-26 PROCEDURE — 80053 COMPREHEN METABOLIC PANEL: CPT | Performed by: NURSE PRACTITIONER

## 2023-05-26 PROCEDURE — 84100 ASSAY OF PHOSPHORUS: CPT | Performed by: NURSE PRACTITIONER

## 2023-05-26 PROCEDURE — 95720 EEG PHY/QHP EA INCR W/VEEG: CPT | Mod: ,,, | Performed by: PSYCHIATRY & NEUROLOGY

## 2023-05-26 PROCEDURE — 63600175 PHARM REV CODE 636 W HCPCS: Performed by: NURSE PRACTITIONER

## 2023-05-26 PROCEDURE — 25000003 PHARM REV CODE 250: Performed by: PHYSICIAN ASSISTANT

## 2023-05-26 PROCEDURE — 94761 N-INVAS EAR/PLS OXIMETRY MLT: CPT

## 2023-05-26 PROCEDURE — 85025 COMPLETE CBC W/AUTO DIFF WBC: CPT | Performed by: NURSE PRACTITIONER

## 2023-05-26 PROCEDURE — 87205 SMEAR GRAM STAIN: CPT | Performed by: PHYSICIAN ASSISTANT

## 2023-05-26 RX ORDER — DEXMEDETOMIDINE HYDROCHLORIDE 4 UG/ML
INJECTION, SOLUTION INTRAVENOUS
Status: COMPLETED
Start: 2023-05-26 | End: 2023-05-26

## 2023-05-26 RX ORDER — FENTANYL CITRATE-0.9 % NACL/PF 10 MCG/ML
0-200 PLASTIC BAG, INJECTION (ML) INTRAVENOUS CONTINUOUS
Status: DISCONTINUED | OUTPATIENT
Start: 2023-05-26 | End: 2023-05-27

## 2023-05-26 RX ORDER — FENTANYL CITRATE 50 UG/ML
50 INJECTION, SOLUTION INTRAMUSCULAR; INTRAVENOUS
Status: DISCONTINUED | OUTPATIENT
Start: 2023-05-26 | End: 2023-05-26

## 2023-05-26 RX ORDER — DEXMEDETOMIDINE HYDROCHLORIDE 4 UG/ML
0-1.4 INJECTION, SOLUTION INTRAVENOUS CONTINUOUS
Status: DISCONTINUED | OUTPATIENT
Start: 2023-05-27 | End: 2023-05-28

## 2023-05-26 RX ORDER — CLOBAZAM 2.5 MG/ML
20 SUSPENSION ORAL 2 TIMES DAILY
Status: DISCONTINUED | OUTPATIENT
Start: 2023-05-26 | End: 2023-05-27

## 2023-05-26 RX ORDER — LORAZEPAM 2 MG/ML
4 INJECTION INTRAMUSCULAR ONCE
Status: COMPLETED | OUTPATIENT
Start: 2023-05-26 | End: 2023-05-26

## 2023-05-26 RX ADMIN — LACOSAMIDE 200 MG: 10 INJECTION INTRAVENOUS at 03:05

## 2023-05-26 RX ADMIN — Medication 200 MCG/HR: at 10:05

## 2023-05-26 RX ADMIN — LEVETIRACETAM 2000 MG: 100 INJECTION, SOLUTION INTRAVENOUS at 08:05

## 2023-05-26 RX ADMIN — LACOSAMIDE 200 MG: 10 INJECTION INTRAVENOUS at 05:05

## 2023-05-26 RX ADMIN — DEXMEDETOMIDINE HYDROCHLORIDE 0.3 MCG/KG/HR: 4 INJECTION, SOLUTION INTRAVENOUS at 11:05

## 2023-05-26 RX ADMIN — PIPERACILLIN SODIUM AND TAZOBACTAM SODIUM 4.5 G: 4; .5 INJECTION, POWDER, FOR SOLUTION INTRAVENOUS at 01:05

## 2023-05-26 RX ADMIN — POTASSIUM CHLORIDE 10 MEQ: 7.46 INJECTION, SOLUTION INTRAVENOUS at 08:05

## 2023-05-26 RX ADMIN — SODIUM CHLORIDE, POTASSIUM CHLORIDE, SODIUM LACTATE AND CALCIUM CHLORIDE: 600; 310; 30; 20 INJECTION, SOLUTION INTRAVENOUS at 01:05

## 2023-05-26 RX ADMIN — HEPARIN SODIUM 5000 UNITS: 5000 INJECTION INTRAVENOUS; SUBCUTANEOUS at 10:05

## 2023-05-26 RX ADMIN — PROPOFOL 30 MCG/KG/MIN: 10 INJECTION, EMULSION INTRAVENOUS at 01:05

## 2023-05-26 RX ADMIN — MAGNESIUM SULFATE 2 G: 2 INJECTION INTRAVENOUS at 04:05

## 2023-05-26 RX ADMIN — PROPOFOL 50 MCG/KG/MIN: 10 INJECTION, EMULSION INTRAVENOUS at 12:05

## 2023-05-26 RX ADMIN — AMLODIPINE BESYLATE 10 MG: 10 TABLET ORAL at 08:05

## 2023-05-26 RX ADMIN — CEFTRIAXONE 2 G: 2 INJECTION, POWDER, FOR SOLUTION INTRAMUSCULAR; INTRAVENOUS at 01:05

## 2023-05-26 RX ADMIN — PROPOFOL 50 MCG/KG/MIN: 10 INJECTION, EMULSION INTRAVENOUS at 09:05

## 2023-05-26 RX ADMIN — FAMOTIDINE 20 MG: 20 TABLET ORAL at 08:05

## 2023-05-26 RX ADMIN — VANCOMYCIN HYDROCHLORIDE 1500 MG: 1.5 INJECTION, POWDER, LYOPHILIZED, FOR SOLUTION INTRAVENOUS at 12:05

## 2023-05-26 RX ADMIN — PIPERACILLIN SODIUM AND TAZOBACTAM SODIUM 4.5 G: 4; .5 INJECTION, POWDER, FOR SOLUTION INTRAVENOUS at 08:05

## 2023-05-26 RX ADMIN — CLOBAZAM 20 MG: 2.5 SUSPENSION ORAL at 08:05

## 2023-05-26 RX ADMIN — POTASSIUM CHLORIDE 10 MEQ: 7.46 INJECTION, SOLUTION INTRAVENOUS at 05:05

## 2023-05-26 RX ADMIN — CLOBAZAM 20 MG: 2.5 SUSPENSION ORAL at 09:05

## 2023-05-26 RX ADMIN — LORAZEPAM 4 MG: 2 INJECTION INTRAMUSCULAR; INTRAVENOUS at 05:05

## 2023-05-26 RX ADMIN — HEPARIN SODIUM 5000 UNITS: 5000 INJECTION INTRAVENOUS; SUBCUTANEOUS at 01:05

## 2023-05-26 RX ADMIN — Medication 25 MCG/HR: at 04:05

## 2023-05-26 RX ADMIN — PROPOFOL 40 MCG/KG/MIN: 10 INJECTION, EMULSION INTRAVENOUS at 04:05

## 2023-05-26 RX ADMIN — MAGNESIUM SULFATE 2 G: 2 INJECTION INTRAVENOUS at 06:05

## 2023-05-26 RX ADMIN — POTASSIUM CHLORIDE 10 MEQ: 7.46 INJECTION, SOLUTION INTRAVENOUS at 10:05

## 2023-05-26 RX ADMIN — HEPARIN SODIUM 5000 UNITS: 5000 INJECTION INTRAVENOUS; SUBCUTANEOUS at 05:05

## 2023-05-26 RX ADMIN — SODIUM CHLORIDE, POTASSIUM CHLORIDE, SODIUM LACTATE AND CALCIUM CHLORIDE: 600; 310; 30; 20 INJECTION, SOLUTION INTRAVENOUS at 04:05

## 2023-05-26 NOTE — NURSING
2213 5/25/23 Unable to get 2 sets blood cultures with mult sticks by mult nurses. 1 set with ultrasound obtained.

## 2023-05-26 NOTE — PROGRESS NOTES
[facility] - Neuro Critical Care  Neurology-Epilepsy  Progress Note    Patient Name: Yeison Andrade  MRN: [id number]  Admission Date: 5/24/2023  Hospital Length of Stay: 2 days  Code Status: Full Code   Attending Provider: Franky Proctor DO  Primary Care Physician: Primary Doctor No   Principal Problem:Status epilepticus    Subjective:     Hospital Course:   38 year old man transferred form [facility] for management of status epilepticus  5/25>5/26: Breach rhythm right hemisphere, pseudo periodic focal epileptiform discharges in the right frontal, right central parietal and posterior regions, no seizures. Recommend increasing Clobazam to 20 mg BID, ok to begin slow Propofol wean.       Interval History: Clinical event this morning of upper body tremors, no EEG correlate noted to event. EEG overnight stable from prior day.     Current Facility-Administered Medications   Medication Dose Route Frequency Provider Last Rate Last Admin    acetaminophen tablet 650 mg  650 mg Oral Q6H PRN Kayleigh L Love, NP   650 mg at 05/25/23 2021    amLODIPine tablet 10 mg  10 mg Per OG tube Daily Franky Proctor DO   10 mg at 05/26/23 0811    calcium gluconate 1 g in NS IVPB (premixed)  1 g Intravenous PRN Kayleigh L Love, NP        calcium gluconate 1 g in NS IVPB (premixed)  2 g Intravenous PRN Kayleigh L Love, NP        calcium gluconate 1 g in NS IVPB (premixed)  3 g Intravenous PRN Kayleigh L Love, NP        cefTRIAXone (ROCEPHIN) 2 g in dextrose 5 % in water (D5W) 5 % 50 mL IVPB (MB+)  2 g Intravenous Q24H Annmarie Smith PA-C   Stopped at 05/26/23 0216    cloBAZam suspension 20 mg  20 mg Oral BID Markus Cortes MD        famotidine tablet 20 mg  20 mg Oral BID Kayleigh L Love, NP   20 mg at 05/26/23 0811    fentaNYL 2500 mcg in 0.9% sodium chloride 250 mL infusion premix (titrating)  0-200 mcg/hr Intravenous Continuous Franky Proctor DO 7.5 mL/hr at 05/26/23 1100 75 mcg/hr at 05/26/23 1100    heparin (porcine) injection  5,000 Units  5,000 Units Subcutaneous Q8H Kayleigh L Love, NP   5,000 Units at 05/26/23 0516    labetalol 20 mg/4 mL (5 mg/mL) IV syring  10 mg Intravenous Q4H PRN Kayleigh L Love, NP   10 mg at 05/25/23 1751    lacosamide (VIMPAT) 200 mg in sodium chloride 0.9% 100 mL IVPB  200 mg Intravenous Q12H Kayleigh L Love, NP   Stopped at 05/26/23 0427    lactated ringers infusion   Intravenous Continuous Kayleigh L Love,  mL/hr at 05/26/23 1100 Rate Verify at 05/26/23 1100    levETIRAcetam injection 2,000 mg  2,000 mg Intravenous Q12H Kayleigh L Love, NP   2,000 mg at 05/26/23 0811    magnesium sulfate 2g in water 50mL IVPB (premix)  2 g Intravenous PRN Kayleigh L Love, NP   Stopped at 05/25/23 0701    magnesium sulfate 2g in water 50mL IVPB (premix)  4 g Intravenous PRN Kayleigh L Love, NP   Stopped at 05/26/23 0843    niCARdipine 40 mg/200 mL (0.2 mg/mL) infusion  0-15 mg/hr Intravenous Continuous Kayleigh L Love, NP   Stopped at 05/26/23 0049    potassium chloride 10 mEq in 100 mL IVPB  40 mEq Intravenous PRN Kayleigh L Love,  mL/hr at 05/26/23 1100 Rate Verify at 05/26/23 1100    And    potassium chloride 10 mEq in 100 mL IVPB  60 mEq Intravenous PRN Kayleigh L Love, NP        And    potassium chloride 10 mEq in 100 mL IVPB  80 mEq Intravenous PRN Kayleigh L Love, NP        propofol (DIPRIVAN) 10 mg/mL infusion (NON-TITRATING)  30 mcg/kg/min Intravenous Continuous Annmarie Smith PA-C 20.6 mL/hr at 05/26/23 1100 50 mcg/kg/min at 05/26/23 1100    sodium chloride 0.9% flush 10 mL  10 mL Intravenous PRN Kayleigh L Love, NP        sodium phosphate 15 mmol in dextrose 5 % (D5W) 250 mL IVPB  15 mmol Intravenous PRN Kayleigh L Love, NP        sodium phosphate 20.01 mmol in dextrose 5 % (D5W) 250 mL IVPB  20.01 mmol Intravenous PRN Kayleigh L Nathalie, NP        sodium phosphate 30 mmol in dextrose 5 % (D5W) 250 mL IVPB  30 mmol Intravenous PRN Kayleighmik Zelaya, NP         Continuous Infusions:   fentanyl 75 mcg/hr (05/26/23 1100)    lactated  ringers 200 mL/hr at 05/26/23 1100    niCARdipine Stopped (05/26/23 0049)    propofol 50 mcg/kg/min (05/26/23 1100)       Review of Systems   Unable to perform ROS: Intubated   Objective:     Vital Signs (Most Recent):  Temp: 96.6 °F (35.9 °C) (05/26/23 1118)  Pulse: 71 (05/26/23 1118)  Resp: 16 (05/26/23 1118)  BP: (!) 164/82 (05/26/23 1118)  SpO2: 100 % (05/26/23 1118) Vital Signs (24h Range):  Temp:  [96.4 °F (35.8 °C)-102.8 °F (39.3 °C)] 96.6 °F (35.9 °C)  Pulse:  [] 71  Resp:  [15-28] 16  SpO2:  [97 %-100 %] 100 %  BP: (107-204)/() 164/82     Weight: 68.5 kg (151 lb)  Body mass index is 20.48 kg/m².     Physical Exam  Constitutional:       Appearance: He is not toxic-appearing or diaphoretic.   HENT:      Head: Normocephalic.      Comments: EEG in place  Eyes:      General: No scleral icterus.     Pupils: Pupils are equal, round, and reactive to light.   Pulmonary:      Effort: Pulmonary effort is normal. No respiratory distress.   Abdominal:      General: There is no distension.      Palpations: Abdomen is soft.   Musculoskeletal:         General: No deformity or signs of injury.      Cervical back: Neck supple. No rigidity.   Skin:     General: Skin is dry.      Coloration: Skin is not jaundiced.   Psychiatric:      Comments: Unable to assess          NEUROLOGICAL EXAMINATION:     CRANIAL NERVES     CN III, IV, VI   Pupils are equal, round, and reactive to light.       Comatose   CN:   No blink to threat OU   SMITA OU   Corneal weak OU   + cough  No spontaneous eye opening   Face symmetric   No withdrawal to noxious stimuli in extremities    Exam findings to suggest seizures:  Myoclonus - no   eye twitching - no   Nystagmus - no   gaze deviation - no   waxy rigidity - no       Significant Labs: All pertinent lab results from the past 24 hours have been reviewed.    Significant Studies: I have reviewed all pertinent imaging results/findings within the past 24 hours.    Assessment and Plan:     *  Status epilepticus  38M PMHx of SDH/TBI s/p craniectomy 12/2020, Bipolar disorder, HTN, and known history of seizure with reported medication noncompliance presented to [facility] after witnessed sz, EEG at OSH with focal right status. Transferred to [facility] 5/24 for higher level of care.    Recommendations:  - Continuous vEEG monitoring - 5/25>5/26 breach rhythm in the right hemisphere, pseudo periodic focal epileptiform discharges in the right frontal, right central parietal, and posterior regions, no seizures  - Agree with Midazolam discontinuation 5/25  - Recommend to increase Clobazam to 20 mg BID  - Currently on Propofol 50 mKm, ok to begin slow Propofol wean   - Recommend to continue Lacosamide 200 mg BID, Levetiracetam 2000 mg BID  - Avoid agents that lower seizure threshold  - Management of any acute infectious/metabolic abnormalities per [facility]  - Seizure precautions      Discussed plan of care with [facility] team, no family available at bedside. Will follow, please call with any questions.     Non-traumatic rhabdomyolysis  - In setting of recurrent seizures, CPK peaked 5/23 and trending down since that time  - Management per [facility], on LR    On mechanically assisted ventilation  - Intubated at OSH  - Vent management per [facility], daily CXR/ABG    ALONDRA (acute kidney injury)  - Cr elevated to 2.3 on OSH presentation, trending down since admission  - Management per [facility]    HTN (hypertension)  - Hx of, vitals reviewed, management per [facility]      VTE Risk Mitigation (From admission, onward)         Ordered     heparin (porcine) injection 5,000 Units  Every 8 hours         05/24/23 1630     IP VTE LOW RISK PATIENT  Once         05/24/23 1523     Place sequential compression device  Until discontinued         05/24/23 1523                Ilana Beltrán PA-C  Neurology-Epilepsy  [facility] - Neuro Critical Care  Staff: Dr. Baptiste

## 2023-05-26 NOTE — NURSING
0500  Noted on eeg persistant siezure activity. Eyes to right. MD called and updated. New orders rec'd. Ativan 4mg given IV.

## 2023-05-26 NOTE — PROCEDURES
DATE: 5/25/26    EEG NUMBER: [id number]    REFERRING PHYSICIAN:  Dr. Hughes      This EEG was performed to assess for subclinical seizures      ELECTROENCEPHALOGRAM REPORT     METHODOLOGY:  Electroencephalographic (EEG) recording is with electrodes placed according to the International 10-20 placement system.  Thirty two (32) channels of digital signal are simultaneously recorded from the scalp and may include EKG, EMG, and/or eye monitors.   Recording band pass was 0.1 to 512 hz.  Digital video recording of the patient is simultaneously recorded with the EEG.  The nursing staff report clinical symptoms and may press an event button when the patient has symptoms of clinical interest to the treating physicians.  EEG and video recording is stored and archived in digital format.  The entire recording is visually reviewed, and the times identified by computer analysis as being spikes or seizures are reviewed again.  Activation procedures which include photic stimulation, hyperventilation and instructing patients to perform simple task are done in selected patients.   Compresses spectral analysis (CSA) is also performed on the activity recorded from each individual channel.  This is displayed as a power display of frequencies from 0 to 30 Hz over time.   The CSA analysis is done and displayed continuously.  This is reviewed for asymmetries in power between homologous areas of the scalp and for presence of changes in power which can be seen when seizures occur.  Sections of suspected abnormalities on the CSA is then compared with the original EEG recording.                [employer] software was also utilized in the review of this study.  This software suite analyzes the EEG recording in multiple domains.  Coherence and rhythmicity is computed to identify EEG sections which may contain organized seizures.  Each channel undergoes analysis to detect presence of spike and sharp waves which have special and morphological  characteristic of epileptic activity.  The routine EEG recording is converted from spacial into frequency domain.  This is then displayed comparing homologous areas to identify areas of significant asymmetry.  Algorithm to identify non-cortically generated artifact is used to separate eye movement, EMG and other artifact from the EEG.     Recording times   Start on May 25, 2023 at hours 10 minute 37 seconds 19  End on May 26, 2023 at hours 7 minute 0 seconds 2  The total time of EEG recording for the study was 20 hours and 9 minutes.    EEG FINDINGS:  The recording was obtained with a number of standard bipolar and referential montages during obtunded state.  Diffuse disorganized low amplitude mixed delta and occasional theta range slowing was noted.  The background is asymmetric with high-amplitude sharply contoured irregular slowing over the right hemisphere consistent with a breach rhythm.  In addition the right hemisphere is punctuated by frequent epileptiform discharges which were pseudo periodic of up to 2 hertz.  These included sharp waves maximally at F4, C4-P4 and frontal polar maximum.  During deeper stages of clinical sleep symmetric spindles were noted.  No evolving electrographic seizures were visualized.    The EKG channel revealed a sinus rhythm.     IMPRESSION:  This is an abnormal EEG during obtunded state.  Diffuse disorganized low-amplitude slowing of the background was noted.  Right hemisphere breach rhythm was noted.  Right hemisphere focal epileptiform discharges were noted which were pseudo periodic.  Several episodes of upper body shivering and labored breathing as well as shoulder twitching were noted without EEG correlate.  One episode of stimulus induced periodic discharges was noted with tachycardia.     CLINICAL CORRELATION:  The patient is a 38-year-old male with a history of right-sided craniotomy who is currently maintained on intravenous infusions of propofol.  Patient is also  maintained on Keppra Vimpat and Onfi.  This is an abnormal EEG during obtunded state.  The overall degree of slowing disorganization is suggestive of a moderate to severe encephalopathy likely secondary to use of anesthetic.  The presence of a breach rhythm in the right hemisphere correlates a right-sided craniotomy.  The presence of pseudo periodic focal epileptiform discharges in the right frontal, right central parietal and posterior regions confers increased risk for focal seizures from these regions.  During this study no seizures were recorded.

## 2023-05-26 NOTE — PROGRESS NOTES
Pharmacokinetic Initial Assessment: IV Vancomycin    Assessment/Plan:    - Initiate intravenous vancomycin with loading dose of 1500 mg once  - Maintenance dose 1000 mg Q12H  - Goal trough 15-20 mcg/mL  - Draw trough prior to fifth dose on 5/28 at 11:00    Pharmacy will continue to follow and monitor vancomycin.    Please contact pharmacy at extension [phone] with any questions regarding this assessment.     Thank you for the consult,   Simin Grier, PharmD, [facility]  Neurocritical Care Pharmacist  [id number]         Patient brief summary:  Yeison Andrade is a 38 y.o. male initiated on antimicrobial therapy with IV Vancomycin for treatment of suspected  pneumonia    Drug Allergies:   Review of patient's allergies indicates:  No Known Allergies    Actual Body Weight:   68.5 kg    Renal Function:   Estimated Creatinine Clearance: 97 mL/min (based on SCr of 1 mg/dL).,     Dialysis Method (if applicable):  N/A    CBC (last 72 hours):  Recent Labs   Lab Result Units 05/24/23  0324 05/24/23  1519 05/25/23  0336 05/26/23  0323   WBC K/uL 9.48  --  8.28 14.19*   Hemoglobin g/dL 10.8*  --  10.4* 10.8*   Hemoglobin A1C %  --  4.5  --   --    Hematocrit % 32.6*  --  32.3* 33.6*   Platelets K/uL 175  --  179 197   Gran % % 68.5  --  70.3 80.0*   Lymph % % 24.3  --  19.2 13.3*   Mono % % 4.3  --  6.8 4.8   Eosinophil % % 2.4  --  3.4 1.3   Basophil % % 0.3  --  0.1 0.2   Differential Method  Automated  --  Automated Automated       Metabolic Panel (last 72 hours):  Recent Labs   Lab Result Units 05/24/23  0324 05/24/23  1519 05/24/23  1807 05/25/23  0336 05/25/23 2115 05/26/23  0323   Sodium mmol/L 142 141  --  142  --  142   Potassium mmol/L 3.1* 3.9  --  3.6  --  3.9   Chloride mmol/L 110 109  --  109  --  106   CO2 mmol/L 26 24  --  21*  --  24   Glucose mg/dL 87 90  --  81  --  87   Glucose, UA   --   --  Negative  --  Negative  --    BUN mg/dL 11 9  --  8  --  9   Creatinine mg/dL 1.2 1.1  --  0.9  --  1.0   Albumin g/dL 2.9*  --    --  2.7*  --  2.7*   Total Bilirubin mg/dL 1.4*  --   --  0.9  --  0.7   Alkaline Phosphatase U/L 40*  --   --  48*  --  61   AST U/L 219*  --   --  134*  --  100*   ALT U/L 70*  --   --  59*  --  57*   Magnesium mg/dL 1.5*  --   --  1.6  --  1.4*   Phosphorus mg/dL 1.7*  --   --  3.5  --  4.7*       Drug levels (last 3 results):  No results for input(s): VANCOMYCINRA, VANCORANDOM, VANCOMYCINPE, VANCOPEAK, VANCOMYCINTR, VANCOTROUGH in the last 72 hours.    Microbiologic Results:  Microbiology Results (last 7 days)       Procedure Component Value Units Date/Time    Urine culture [[id number]] Collected: 05/24/23 1807    Order Status: Completed Specimen: Urine Updated: 05/26/23 1527     Urine Culture, Routine No growth    Narrative:      Specimen Source->Urine    Culture, Respiratory with Gram Stain [[id number]] Collected: 05/26/23 0024    Order Status: Completed Specimen: Respiratory from Tracheal Aspirate Updated: 05/26/23 1009     Gram Stain (Respiratory) <10 epithelial cells per low power field.     Gram Stain (Respiratory) Many WBC's     Gram Stain (Respiratory) Many Gram positive cocci     Gram Stain (Respiratory) Moderate Gram negative rods    Blood culture [[id number]] Collected: 05/25/23 2203    Order Status: Completed Specimen: Blood from Peripheral, Antecubital, Right Updated: 05/26/23 0515     Blood Culture, Routine No Growth to date    Narrative:      Site #2 - Aerobic and Anaerobic    Blood culture [[id number]] Collected: 05/25/23 2203    Order Status: Sent Specimen: Blood from Peripheral, Antecubital, Right Updated: 05/25/23 2204

## 2023-05-26 NOTE — PLAN OF CARE
[facility] Care Plan    POC reviewed with Yeison Andrade and family at 1400. Pt unable to verbalized understanding. Questions and concerns addressed. No acute events today. Pt progressing toward goals. Will continue to monitor. See below and flowsheets for full assessment and VS info.   - Fentanyl gtt titrated   - LR at 100mL/hr   - Propofol weaned off   - Tube feedings started   - Garcia removed   - cEEG in place           Is this a stroke patient? no    Neuro:  [place] Coma Scale  Best Eye Response: 4-->(E4) spontaneous  Best Motor Response: 6-->(M6) obeys commands  Best Verbal Response: 1-->(V1) none  [place] Coma Scale Score: 11  Assessment Qualifiers: patient chemically sedated or paralyzed, patient intubated  Pupil PERRLA: yes     24 hr Temp:  [96.4 °F (35.8 °C)-102.8 °F (39.3 °C)]     CV:   Rhythm: normal sinus rhythm  BP goals:   SBP < 180  MAP > 65    Resp:      Vent Mode: A/C  Set Rate: 16 BPM  Oxygen Concentration (%): 24  Vt Set: 500 mL  PEEP/CPAP: 5 cmH20    Plan: wean to extubate    GI/:     Diet/Nutrition Received: tube feeding, NPO  Last Bowel Movement: 05/26/23  Voiding Characteristics: urethral catheter (bladder)    Intake/Output Summary (Last 24 hours) at 5/26/2023 1755  Last data filed at 5/26/2023 1705  Gross per 24 hour   Intake 4284.12 ml   Output 3115 ml   Net 1169.12 ml     Unmeasured Output  Stool Occurrence: 1    Labs/Accuchecks:  Recent Labs   Lab 05/26/23  0323   WBC 14.19*   RBC 3.70*   HGB 10.8*   HCT 33.6*         Recent Labs   Lab 05/26/23  0323      K 3.9   CO2 24      BUN 9   CREATININE 1.0   ALKPHOS 61   ALT 57*   *   BILITOT 0.7    No results for input(s): PROTIME, INR, APTT, HEPANTIXA in the last 168 hours.   Recent Labs   Lab 05/22/23  1840 05/23/23  0414 05/25/23  0336   CPK 28571*   < > 8245*   TROPONINI 0.504*  --   --     < > = values in this interval not displayed.       Electrolytes: N/A - electrolytes WDL  Accuchecks: none    Gtts:   fentanyl 50  mcg/hr (05/26/23 1705)    lactated ringers 100 mL/hr at 05/26/23 1705    niCARdipine Stopped (05/26/23 0049)    propofol Stopped (05/26/23 1557)       LDA/Wounds:  Lines/Drains/Airways       Peripherally Inserted Central Catheter Line  Duration             PICC Triple Lumen 05/23/23 1605 right brachial 3 days              Drain  Duration                  NG/OG Tube 05/23/23 1545 16 Fr. Center mouth 3 days         Fecal Incontinence  05/24/23 1600 2 days    Male External Urinary Catheter 05/26/23 Medium <1 day              Airway  Duration                  Airway - Non-Surgical 05/23/23 1540 Endotracheal Tube 3 days              Peripheral Intravenous Line  Duration                  Peripheral IV - Single Lumen 05/23/23 1530 20 G Left Forearm 3 days                  Wounds: No  Wound care consulted: No

## 2023-05-26 NOTE — ASSESSMENT & PLAN NOTE
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 Transferred to [facility] 5/24 for higher level of care.    Recommendations:  - Continuous vEEG monitoring - 5/25>5/26 breach rhythm in the right hemisphere, pseudo periodic focal epileptiform discharges in the right frontal, right central parietal, and posterior regions, no seizures  - Agree with Midazolam discontinuation 5/25  - Recommend to increase Clobazam to 20 mg BID  - Currently on Propofol 50 mKm, ok to begin slow Propofol wean   - Recommend to continue Lacosamide 200 mg BID, Levetiracetam 2000 mg BID  - Avoid agents that lower seizure threshold  - Management of any acute infectious/metabolic abnormalities per NCC  - Seizure precautions      Discussed plan of care with NCC team, no family available at bedside. Will follow, please call with any questions.

## 2023-05-26 NOTE — PLAN OF CARE
CMICU DAILY GOALS       A: Awake    RASS: Goal - RASS Goal: -3-->moderate sedation  Actual - RASS ([place] Agitation-Sedation Scale): light sedation   Restraint necessity:    B: Breathe   SBT: Not attempted   C: Coordinate A & B, analgesics/sedatives   Pain: managed    SAT: Not intubated  D: Delirium   CAM-ICU: Overall CAM-ICU: Positive  E: Early(intubated/ Progressive (non-intubated) Mobility   MOVE Screen: Fail   Activity: Activity Management: Patient unable to perform activities  FAS: Feeding/Nutrition   Diet order: Diet/Nutrition Received: NPO,    T: Thrombus   DVT prophylaxis: VTE Required Core Measure: Pharmacological prophylaxis initiated/maintained  H: HOB Elevation   Head of Bed (HOB) Positioning: HOB at 30-45 degrees  U: Ulcer Prophylaxis   GI: yes  G: Glucose control   managed    S: Skin   Bathing/Skin Care: bath, complete, linen changed  Device Skin Pressure Protection: absorbent pad utilized/changed, adhesive use limited  Pressure Reduction Devices: foam padding utilized  Pressure Reduction Techniques: weight shift assistance provided  Skin Protection: adhesive use limited, incontinence pads utilized  B: Bowel Function   no issues   I: Indwelling Catheters   Garcia necessity: [REMOVED]      Urethral Catheter 05/24/23 1810-Reason for Continuing Urinary Catheterization: Critically ill in ICU and requiring hourly monitoring of intake/output  [REMOVED]      Urethral Catheter 05/23/23 1430-Reason for Continuing Urinary Catheterization: Urinary retention, Non-healing sacral/perineal wound, Required immobilization, Hospice/Comfort Care/Palliative Care, Critically ill in ICU and requiring hourly monitoring of intake/output, Chronic Indwelling Urinary Catheter on Admission, Placed by Urology Service       Urethral Catheter 05/25/23 2200 Temperature probe 16 Fr.-Reason for Continuing Urinary Catheterization: Critically ill in ICU and requiring hourly monitoring of intake/output   CVC necessity: Yes  D:  De-escalation Antibiotics   No    Family/Goals of care/Code Status   Code Status: Full Code    24H Vital Sign Range  Temp:  [98.4 °F (36.9 °C)-102.8 °F (39.3 °C)]   Pulse:  []   Resp:  [15-62]   BP: (107-204)/()   SpO2:  [97 %-100 %]      Shift Events   Replacing electrolytes per prn orders. Fever early in shift. Ice packs, tylenol, pan cultures.    VS and assessment per flow sheet, patient progressing towards goals as tolerated, plan of care reviewed with  Mr Andrade and family , all concerns addressed, will continue to monitor.    Demetra Andres

## 2023-05-26 NOTE — PROGRESS NOTES
[facility] - Cardiac Medical ICU  Neurocritical Care  Progress Note    Admit Date: 5/24/2023  Service Date: 05/26/2023  Length of Stay: 2    Subjective:     Chief Complaint: Status epilepticus    History of Present Illness:  39 yo M w/ PMHx of SDH/TBI s/p craniectomy 12/2020, Bipolar disorder, HTN, and known history of seizure presented to [facility] ED on 5/22/23 without identifiable information after witnessed seizure. Was naked on presentation and incontinent of urine. Pt could not be identifed on presentation, was brought in by friend who also did not provide any information. Was given 10 mg Versed IM via EMS. Was obtunded and minimally responsive to painful stimuli.Pt intubated and sedated 5/23/23 in preparation for transfer to Neuro ICU. Had breakthrough seizure overnight despite sedation on Propofol and Versed, requiring Ativan push. EEG cap restarted this morning, discussed with Epileptologist at [facility]. Spot EEG unable to be obtained as technician has limited availability at [facility]. Admitted to [facility] for continuous EEG and neuro monitoring.     05/25/2023: NAEON, Wean sedation (Versed 1st, then once off, slowly wean propofol off) while monitoring EEG, continue AED. Updated patient's mother over the phone about the updates and plan.   05/26/2023: Wean off propofol, f/u EEG while weaning sedation. TF, D/C darby,  decrease LR to 100/hr, broaden to Vanc/Zosyn, f/u Cx, MRI if no improvement in exam off sedation in absence of sz    Review of Systems   Reason unable to perform ROS: decrease LOC.   Objective:      Vitals:  Vitals:    05/26/23 1105 05/26/23 1107 05/26/23 1118 05/26/23 1205   BP: 138/78  (!) 164/82 (!) 144/78   BP Location: Left arm      Patient Position: Lying      Pulse: 67 69 71 75   Resp: 16 17 16 19   Temp: 96.4 °F (35.8 °C) 96.4 °F (35.8 °C) 96.6 °F (35.9 °C) 97.2 °F (36.2 °C)   TempSrc: Core Bladder      SpO2: 100% 100% 100% 100%   Weight:       Height:   6' (1.829 m)               Physical Exam  Constitutional:       Appearance: He is ill-appearing.   HENT:      Head: Normocephalic.      Nose: Nose normal.      Mouth/Throat:      Mouth: Mucous membranes are moist.      Pharynx: Oropharynx is clear.   Eyes:      Pupils: Pupils are equal, round, and reactive to light.   Cardiovascular:      Rate and Rhythm: Normal rate and regular rhythm.      Pulses: Normal pulses.      Heart sounds: Normal heart sounds.   Pulmonary:      Effort: Pulmonary effort is normal.      Breath sounds: Normal breath sounds.   Abdominal:      General: Bowel sounds are normal.      Palpations: Abdomen is soft.   Musculoskeletal:         General: Normal range of motion.   Skin:     General: Skin is warm and dry.      Capillary Refill: Capillary refill takes 2 to 3 seconds.   Neurological: on prop & fentanyl  GCS 3  Difficult to assess pupils with non conjugate upward gaze  Corneal R>L, cough present  Minimal tremulous WD to pain in RLE     Unable to test orientation, language, memory, judgment, insight, fund of knowledge, hearing, shoulder shrug, tongue protrusion, coordination, gait due to level of consciousness.        Today I personally reviewed pertinent medications, lines/drains/airways, imaging, cardiology results, laboratory results, microbiology results         Assessment/Plan:      Neuro  * Status epilepticus  - admit to [facility] r/o status  - continuous vEEG  - Non-titrating propofol  - Off versed   - Resume Vimpat 200 mg BID  - Keppra 2000 mg BID  - Continue Onfi 20 mg daily     Seizure  Hx of     Pulmonary  On mechanically assisted ventilation  - CXR daily  - ABG daily     Cardiac/Vascular  HTN (hypertension)  Hx of     Orthopedic  Non-traumatic rhabdomyolysis  Resolved  - Ck trending down  -  ml/hr      The patient is being Prophylaxed for:  Venous Thromboembolism with: Mechanical or Chemical  Stress Ulcer with: H2B  Ventilator Pneumonia with: chlorhexidine oral care    Activity Orders            Turn  patient starting at 05/24 1600    Elevate HOB starting at 05/24 1519    Diet NPO: NPO starting at 05/24 1519          Full Code    Rodger Marie MD  Neurocritical Care  [facility] - Cardiac Medical ICU

## 2023-05-26 NOTE — NURSING
Patient arrived to [facility] from MICU >> [id number]    Report received from: CORWIN Gu    Type of stroke/diagnosis:  seizures    Current symptoms: upward gaze, tremor to touch, pupils 2 and brisk bilaterally    Skin assessment done: Yes  Wounds noted: none    Green Completed? No-intubated    Patient Belongings on Admit: none    NCC notified: MD Frank

## 2023-05-26 NOTE — SUBJECTIVE & OBJECTIVE
Interval History: Clinical event this morning of upper body tremors, no EEG correlate noted to event. EEG overnight stable from prior day.     Current Facility-Administered Medications   Medication Dose Route Frequency Provider Last Rate Last Admin    acetaminophen tablet 650 mg  650 mg Oral Q6H PRN Kayleigh L Love, NP   650 mg at 05/25/23 2021    amLODIPine tablet 10 mg  10 mg Per OG tube Daily Franky Proctor, DO   10 mg at 05/26/23 0811    calcium gluconate 1 g in NS IVPB (premixed)  1 g Intravenous PRN Kayleigh L Love, NP        calcium gluconate 1 g in NS IVPB (premixed)  2 g Intravenous PRN Kayleigh L Love, NP        calcium gluconate 1 g in NS IVPB (premixed)  3 g Intravenous PRN Kayleigh L Love, NP        cefTRIAXone (ROCEPHIN) 2 g in dextrose 5 % in water (D5W) 5 % 50 mL IVPB (MB+)  2 g Intravenous Q24H Annmarie Smith PA-C   Stopped at 05/26/23 0216    cloBAZam suspension 20 mg  20 mg Oral BID Markus Cortes MD        famotidine tablet 20 mg  20 mg Oral BID Kayleigh L Love, NP   20 mg at 05/26/23 0811    fentaNYL 2500 mcg in 0.9% sodium chloride 250 mL infusion premix (titrating)  0-200 mcg/hr Intravenous Continuous Franky Proctor, DO 7.5 mL/hr at 05/26/23 1100 75 mcg/hr at 05/26/23 1100    heparin (porcine) injection 5,000 Units  5,000 Units Subcutaneous Q8H Kayleigh L Love, NP   5,000 Units at 05/26/23 0516    labetalol 20 mg/4 mL (5 mg/mL) IV syring  10 mg Intravenous Q4H PRN Kayleigh L Love, NP   10 mg at 05/25/23 1751    lacosamide (VIMPAT) 200 mg in sodium chloride 0.9% 100 mL IVPB  200 mg Intravenous Q12H Kayleigh L Love, NP   Stopped at 05/26/23 0427    lactated ringers infusion   Intravenous Continuous Kayleigh L Love,  mL/hr at 05/26/23 1100 Rate Verify at 05/26/23 1100    levETIRAcetam injection 2,000 mg  2,000 mg Intravenous Q12H Kayleigh L Love, NP   2,000 mg at 05/26/23 0811    magnesium sulfate 2g in water 50mL IVPB (premix)  2 g Intravenous PRN Kayleigh Zelaya NP   Stopped at 05/25/23 0701    magnesium  sulfate 2g in water 50mL IVPB (premix)  4 g Intravenous PRN Kayleigh L Love, NP   Stopped at 05/26/23 0843    niCARdipine 40 mg/200 mL (0.2 mg/mL) infusion  0-15 mg/hr Intravenous Continuous Kayleigh L Love, NP   Stopped at 05/26/23 0049    potassium chloride 10 mEq in 100 mL IVPB  40 mEq Intravenous PRN Kayleigh L Love,  mL/hr at 05/26/23 1100 Rate Verify at 05/26/23 1100    And    potassium chloride 10 mEq in 100 mL IVPB  60 mEq Intravenous PRN Kayleigh L Love, NP        And    potassium chloride 10 mEq in 100 mL IVPB  80 mEq Intravenous PRN Kayleigh L Love, NP        propofol (DIPRIVAN) 10 mg/mL infusion (NON-TITRATING)  30 mcg/kg/min Intravenous Continuous Annmarie Smith PA-C 20.6 mL/hr at 05/26/23 1100 50 mcg/kg/min at 05/26/23 1100    sodium chloride 0.9% flush 10 mL  10 mL Intravenous PRN Kayleigh L Love, NP        sodium phosphate 15 mmol in dextrose 5 % (D5W) 250 mL IVPB  15 mmol Intravenous PRN Kayleigh L Love, NP        sodium phosphate 20.01 mmol in dextrose 5 % (D5W) 250 mL IVPB  20.01 mmol Intravenous PRN Kayleigh L Love, NP        sodium phosphate 30 mmol in dextrose 5 % (D5W) 250 mL IVPB  30 mmol Intravenous PRN Kayleigh L Love, NP         Continuous Infusions:   fentanyl 75 mcg/hr (05/26/23 1100)    lactated ringers 200 mL/hr at 05/26/23 1100    niCARdipine Stopped (05/26/23 0049)    propofol 50 mcg/kg/min (05/26/23 1100)       Review of Systems   Unable to perform ROS: Intubated   Objective:     Vital Signs (Most Recent):  Temp: 96.6 °F (35.9 °C) (05/26/23 1118)  Pulse: 71 (05/26/23 1118)  Resp: 16 (05/26/23 1118)  BP: (!) 164/82 (05/26/23 1118)  SpO2: 100 % (05/26/23 1118) Vital Signs (24h Range):  Temp:  [96.4 °F (35.8 °C)-102.8 °F (39.3 °C)] 96.6 °F (35.9 °C)  Pulse:  [] 71  Resp:  [15-28] 16  SpO2:  [97 %-100 %] 100 %  BP: (107-204)/() 164/82     Weight: 68.5 kg (151 lb)  Body mass index is 20.48 kg/m².     Physical Exam  Constitutional:       Appearance: He is not toxic-appearing or diaphoretic.    HENT:      Head: Normocephalic.      Comments: EEG in place  Eyes:      General: No scleral icterus.     Pupils: Pupils are equal, round, and reactive to light.   Pulmonary:      Effort: Pulmonary effort is normal. No respiratory distress.   Abdominal:      General: There is no distension.      Palpations: Abdomen is soft.   Musculoskeletal:         General: No deformity or signs of injury.      Cervical back: Neck supple. No rigidity.   Skin:     General: Skin is dry.      Coloration: Skin is not jaundiced.   Psychiatric:      Comments: Unable to assess          NEUROLOGICAL EXAMINATION:     CRANIAL NERVES     CN III, IV, VI   Pupils are equal, round, and reactive to light.       Comatose   CN:   No blink to threat OU   SMITA OU   Corneal weak OU   + cough  No spontaneous eye opening   Face symmetric   No withdrawal to noxious stimuli in extremities    Exam findings to suggest seizures:  Myoclonus - no   eye twitching - no   Nystagmus - no   gaze deviation - no   waxy rigidity - no       Significant Labs: All pertinent lab results from the past 24 hours have been reviewed.    Significant Studies: I have reviewed all pertinent imaging results/findings within the past 24 hours.

## 2023-05-26 NOTE — HOSPITAL COURSE
38 year old man transferred form [facility] for management of status epilepticus  5/25>5/26: Breach rhythm right hemisphere, pseudo periodic focal epileptiform discharges in the right frontal, right central parietal and posterior regions, no seizures. Recommend increasing Clobazam to 20 mg BID, ok to begin slow Propofol wean.

## 2023-05-27 LAB
ALBUMIN SERPL BCP-MCNC: 2.4 G/DL (ref 3.5–5.2)
ALLENS TEST: ABNORMAL
ALP SERPL-CCNC: 57 U/L (ref 55–135)
ALT SERPL W/O P-5'-P-CCNC: 47 U/L (ref 10–44)
ANION GAP SERPL CALC-SCNC: 8 MMOL/L (ref 8–16)
AST SERPL-CCNC: 84 U/L (ref 10–40)
BACTERIA BLD CULT: NORMAL
BACTERIA BLD CULT: NORMAL
BACTERIA CSF CULT: NO GROWTH
BASOPHILS # BLD AUTO: 0.01 K/UL (ref 0–0.2)
BASOPHILS NFR BLD: 0.1 % (ref 0–1.9)
BILIRUB SERPL-MCNC: 1.3 MG/DL (ref 0.1–1)
BUN SERPL-MCNC: 8 MG/DL (ref 6–20)
CALCIUM SERPL-MCNC: 8.4 MG/DL (ref 8.7–10.5)
CHLORIDE SERPL-SCNC: 107 MMOL/L (ref 95–110)
CO2 SERPL-SCNC: 27 MMOL/L (ref 23–29)
CREAT SERPL-MCNC: 0.9 MG/DL (ref 0.5–1.4)
DIFFERENTIAL METHOD: ABNORMAL
EOSINOPHIL # BLD AUTO: 0.1 K/UL (ref 0–0.5)
EOSINOPHIL NFR BLD: 0.9 % (ref 0–8)
ERYTHROCYTE [DISTWIDTH] IN BLOOD BY AUTOMATED COUNT: 12.6 % (ref 11.5–14.5)
EST. GFR  (NO RACE VARIABLE): >60 ML/MIN/1.73 M^2
GLUCOSE SERPL-MCNC: 132 MG/DL (ref 70–110)
GRAM STN SPEC: NORMAL
GRAM STN SPEC: NORMAL
HCO3 UR-SCNC: 25.9 MMOL/L (ref 24–28)
HCT VFR BLD AUTO: 27.8 % (ref 40–54)
HGB BLD-MCNC: 8.7 G/DL (ref 14–18)
IMM GRANULOCYTES # BLD AUTO: 0.04 K/UL (ref 0–0.04)
IMM GRANULOCYTES NFR BLD AUTO: 0.4 % (ref 0–0.5)
LYMPHOCYTES # BLD AUTO: 1 K/UL (ref 1–4.8)
LYMPHOCYTES NFR BLD: 10.7 % (ref 18–48)
MAGNESIUM SERPL-MCNC: 1.6 MG/DL (ref 1.6–2.6)
MCH RBC QN AUTO: 28.8 PG (ref 27–31)
MCHC RBC AUTO-ENTMCNC: 31.3 G/DL (ref 32–36)
MCV RBC AUTO: 92 FL (ref 82–98)
MONOCYTES # BLD AUTO: 0.5 K/UL (ref 0.3–1)
MONOCYTES NFR BLD: 4.9 % (ref 4–15)
NEUTROPHILS # BLD AUTO: 8.1 K/UL (ref 1.8–7.7)
NEUTROPHILS NFR BLD: 83 % (ref 38–73)
NRBC BLD-RTO: 0 /100 WBC
PCO2 BLDA: 34 MMHG (ref 35–45)
PH SMN: 7.49 [PH] (ref 7.35–7.45)
PHOSPHATE SERPL-MCNC: 2.1 MG/DL (ref 2.7–4.5)
PLATELET # BLD AUTO: 182 K/UL (ref 150–450)
PMV BLD AUTO: 11.4 FL (ref 9.2–12.9)
PO2 BLDA: 107 MMHG (ref 80–100)
POC BE: 3 MMOL/L
POC SATURATED O2: 99 % (ref 95–100)
POC TCO2: 27 MMOL/L (ref 23–27)
POTASSIUM SERPL-SCNC: 3.3 MMOL/L (ref 3.5–5.1)
PROT SERPL-MCNC: 5.1 G/DL (ref 6–8.4)
RBC # BLD AUTO: 3.02 M/UL (ref 4.6–6.2)
SAMPLE: ABNORMAL
SITE: ABNORMAL
SODIUM SERPL-SCNC: 142 MMOL/L (ref 136–145)
VANCOMYCIN TROUGH SERPL-MCNC: 6.5 UG/ML (ref 10–22)
WBC # BLD AUTO: 9.74 K/UL (ref 3.9–12.7)

## 2023-05-27 PROCEDURE — 25000003 PHARM REV CODE 250: Performed by: PHYSICIAN ASSISTANT

## 2023-05-27 PROCEDURE — 85025 COMPLETE CBC W/AUTO DIFF WBC: CPT | Performed by: NURSE PRACTITIONER

## 2023-05-27 PROCEDURE — 63600175 PHARM REV CODE 636 W HCPCS: Performed by: NURSE PRACTITIONER

## 2023-05-27 PROCEDURE — 25000003 PHARM REV CODE 250: Performed by: PSYCHIATRY & NEUROLOGY

## 2023-05-27 PROCEDURE — 99900035 HC TECH TIME PER 15 MIN (STAT)

## 2023-05-27 PROCEDURE — 99900026 HC AIRWAY MAINTENANCE (STAT)

## 2023-05-27 PROCEDURE — 27000221 HC OXYGEN, UP TO 24 HOURS

## 2023-05-27 PROCEDURE — 63600175 PHARM REV CODE 636 W HCPCS: Performed by: PSYCHIATRY & NEUROLOGY

## 2023-05-27 PROCEDURE — 25000003 PHARM REV CODE 250: Performed by: NURSE PRACTITIONER

## 2023-05-27 PROCEDURE — C9254 INJECTION, LACOSAMIDE: HCPCS | Performed by: NURSE PRACTITIONER

## 2023-05-27 PROCEDURE — 99291 PR CRITICAL CARE, E/M 30-74 MINUTES: ICD-10-PCS | Mod: ,,, | Performed by: PSYCHIATRY & NEUROLOGY

## 2023-05-27 PROCEDURE — 80202 ASSAY OF VANCOMYCIN: CPT | Performed by: PSYCHIATRY & NEUROLOGY

## 2023-05-27 PROCEDURE — 95714 VEEG EA 12-26 HR UNMNTR: CPT

## 2023-05-27 PROCEDURE — 95720 EEG PHY/QHP EA INCR W/VEEG: CPT | Mod: ,,, | Performed by: PSYCHIATRY & NEUROLOGY

## 2023-05-27 PROCEDURE — 95720 PR EEG, W/VIDEO, CONT RECORD, I&R, >12<26 HRS: ICD-10-PCS | Mod: ,,, | Performed by: PSYCHIATRY & NEUROLOGY

## 2023-05-27 PROCEDURE — 94003 VENT MGMT INPAT SUBQ DAY: CPT

## 2023-05-27 PROCEDURE — 80053 COMPREHEN METABOLIC PANEL: CPT | Performed by: NURSE PRACTITIONER

## 2023-05-27 PROCEDURE — 82803 BLOOD GASES ANY COMBINATION: CPT

## 2023-05-27 PROCEDURE — 99291 CRITICAL CARE FIRST HOUR: CPT | Mod: ,,, | Performed by: PSYCHIATRY & NEUROLOGY

## 2023-05-27 PROCEDURE — 94761 N-INVAS EAR/PLS OXIMETRY MLT: CPT

## 2023-05-27 PROCEDURE — 83735 ASSAY OF MAGNESIUM: CPT | Performed by: NURSE PRACTITIONER

## 2023-05-27 PROCEDURE — 36600 WITHDRAWAL OF ARTERIAL BLOOD: CPT

## 2023-05-27 PROCEDURE — 20000000 HC ICU ROOM

## 2023-05-27 PROCEDURE — 84100 ASSAY OF PHOSPHORUS: CPT | Performed by: NURSE PRACTITIONER

## 2023-05-27 RX ORDER — FAMOTIDINE 20 MG/1
20 TABLET, FILM COATED ORAL 2 TIMES DAILY
Status: DISCONTINUED | OUTPATIENT
Start: 2023-05-27 | End: 2023-05-28

## 2023-05-27 RX ORDER — LANOLIN ALCOHOL/MO/W.PET/CERES
800 CREAM (GRAM) TOPICAL
Status: DISCONTINUED | OUTPATIENT
Start: 2023-05-27 | End: 2023-05-28

## 2023-05-27 RX ORDER — FENTANYL CITRATE 50 UG/ML
50 INJECTION, SOLUTION INTRAMUSCULAR; INTRAVENOUS
Status: DISCONTINUED | OUTPATIENT
Start: 2023-05-27 | End: 2023-05-28

## 2023-05-27 RX ORDER — SODIUM,POTASSIUM PHOSPHATES 280-250MG
2 POWDER IN PACKET (EA) ORAL
Status: DISCONTINUED | OUTPATIENT
Start: 2023-05-27 | End: 2023-05-28

## 2023-05-27 RX ORDER — ACETAMINOPHEN 325 MG/1
650 TABLET ORAL EVERY 6 HOURS PRN
Status: DISCONTINUED | OUTPATIENT
Start: 2023-05-27 | End: 2023-05-28

## 2023-05-27 RX ORDER — CLOBAZAM 2.5 MG/ML
20 SUSPENSION ORAL 2 TIMES DAILY
Status: DISCONTINUED | OUTPATIENT
Start: 2023-05-27 | End: 2023-05-28

## 2023-05-27 RX ADMIN — FAMOTIDINE 20 MG: 20 TABLET, FILM COATED ORAL at 09:05

## 2023-05-27 RX ADMIN — CLOBAZAM 20 MG: 2.5 SUSPENSION ORAL at 09:05

## 2023-05-27 RX ADMIN — PIPERACILLIN SODIUM AND TAZOBACTAM SODIUM 4.5 G: 4; .5 INJECTION, POWDER, FOR SOLUTION INTRAVENOUS at 08:05

## 2023-05-27 RX ADMIN — DEXMEDETOMIDINE HYDROCHLORIDE 0.2 MCG/KG/HR: 4 INJECTION, SOLUTION INTRAVENOUS at 04:05

## 2023-05-27 RX ADMIN — CLOBAZAM 20 MG: 2.5 SUSPENSION ORAL at 08:05

## 2023-05-27 RX ADMIN — POTASSIUM BICARBONATE 35 MEQ: 391 TABLET, EFFERVESCENT ORAL at 11:05

## 2023-05-27 RX ADMIN — FAMOTIDINE 20 MG: 20 TABLET, FILM COATED ORAL at 08:05

## 2023-05-27 RX ADMIN — Medication 800 MG: at 01:05

## 2023-05-27 RX ADMIN — LEVETIRACETAM 2000 MG: 100 INJECTION, SOLUTION INTRAVENOUS at 09:05

## 2023-05-27 RX ADMIN — DEXMEDETOMIDINE HYDROCHLORIDE 1 MCG/KG/HR: 4 INJECTION, SOLUTION INTRAVENOUS at 08:05

## 2023-05-27 RX ADMIN — HEPARIN SODIUM 5000 UNITS: 5000 INJECTION INTRAVENOUS; SUBCUTANEOUS at 09:05

## 2023-05-27 RX ADMIN — PIPERACILLIN SODIUM AND TAZOBACTAM SODIUM 4.5 G: 4; .5 INJECTION, POWDER, FOR SOLUTION INTRAVENOUS at 05:05

## 2023-05-27 RX ADMIN — HEPARIN SODIUM 5000 UNITS: 5000 INJECTION INTRAVENOUS; SUBCUTANEOUS at 06:05

## 2023-05-27 RX ADMIN — VANCOMYCIN HYDROCHLORIDE 1000 MG: 1 INJECTION, POWDER, LYOPHILIZED, FOR SOLUTION INTRAVENOUS at 01:05

## 2023-05-27 RX ADMIN — POTASSIUM BICARBONATE 35 MEQ: 391 TABLET, EFFERVESCENT ORAL at 08:05

## 2023-05-27 RX ADMIN — LACOSAMIDE 200 MG: 10 INJECTION INTRAVENOUS at 05:05

## 2023-05-27 RX ADMIN — VANCOMYCIN HYDROCHLORIDE 1000 MG: 1 INJECTION, POWDER, LYOPHILIZED, FOR SOLUTION INTRAVENOUS at 11:05

## 2023-05-27 RX ADMIN — POTASSIUM & SODIUM PHOSPHATES POWDER PACK 280-160-250 MG 2 PACKET: 280-160-250 PACK at 09:05

## 2023-05-27 RX ADMIN — SODIUM CHLORIDE, POTASSIUM CHLORIDE, SODIUM LACTATE AND CALCIUM CHLORIDE: 600; 310; 30; 20 INJECTION, SOLUTION INTRAVENOUS at 12:05

## 2023-05-27 RX ADMIN — Medication 800 MG: at 08:05

## 2023-05-27 RX ADMIN — POTASSIUM & SODIUM PHOSPHATES POWDER PACK 280-160-250 MG 2 PACKET: 280-160-250 PACK at 01:05

## 2023-05-27 RX ADMIN — AMLODIPINE BESYLATE 10 MG: 10 TABLET ORAL at 08:05

## 2023-05-27 RX ADMIN — HEPARIN SODIUM 5000 UNITS: 5000 INJECTION INTRAVENOUS; SUBCUTANEOUS at 01:05

## 2023-05-27 RX ADMIN — POTASSIUM & SODIUM PHOSPHATES POWDER PACK 280-160-250 MG 2 PACKET: 280-160-250 PACK at 08:05

## 2023-05-27 RX ADMIN — PIPERACILLIN SODIUM AND TAZOBACTAM SODIUM 4.5 G: 4; .5 INJECTION, POWDER, FOR SOLUTION INTRAVENOUS at 12:05

## 2023-05-27 RX ADMIN — FENTANYL CITRATE 50 MCG: 50 INJECTION, SOLUTION INTRAMUSCULAR; INTRAVENOUS at 07:05

## 2023-05-27 RX ADMIN — LEVETIRACETAM 2000 MG: 100 INJECTION, SOLUTION INTRAVENOUS at 08:05

## 2023-05-27 NOTE — PLAN OF CARE
[facility] Care Plan    POC reviewed with Yeison Andrade and family at 1400. Pt unable to verbalize understanding. Questions and concerns addressed. No acute events today. Pt progressing toward goals. Will continue to monitor. See below and flowsheets for full assessment and VS info.   - Fentanyl gtt off   - Precedex gtt titrated           Is this a stroke patient? no    Neuro:  [place] Coma Scale  Best Eye Response: 4-->(E4) spontaneous  Best Motor Response: 6-->(M6) obeys commands  Best Verbal Response: 1-->(V1) none  [place] Coma Scale Score: 11  Assessment Qualifiers: patient chemically sedated or paralyzed, patient intubated  Pupil PERRLA: yes     24 hr Temp:  [98.1 °F (36.7 °C)-99.4 °F (37.4 °C)]     CV:   Rhythm: normal sinus rhythm  BP goals:   SBP < 180  MAP > 65    Resp:      Vent Mode: Spont  Set Rate: 16 BPM  Oxygen Concentration (%): 24  Vt Set: 500 mL  PEEP/CPAP: 5 cmH20  Pressure Support: 5 cmH20    Plan: wean to extubate    GI/:     Diet/Nutrition Received: NPO, tube feeding  Last Bowel Movement: 05/27/23  Voiding Characteristics: urethral catheter (bladder)    Intake/Output Summary (Last 24 hours) at 5/27/2023 1802  Last data filed at 5/27/2023 1505  Gross per 24 hour   Intake 3766.78 ml   Output 2850 ml   Net 916.78 ml     Unmeasured Output  Stool Occurrence: 1  Pad Count: 1    Labs/Accuchecks:  Recent Labs   Lab 05/27/23  0059   WBC 9.74   RBC 3.02*   HGB 8.7*   HCT 27.8*         Recent Labs   Lab 05/27/23  0059      K 3.3*   CO2 27      BUN 8   CREATININE 0.9   ALKPHOS 57   ALT 47*   AST 84*   BILITOT 1.3*    No results for input(s): PROTIME, INR, APTT, HEPANTIXA in the last 168 hours.   Recent Labs   Lab 05/22/23  1840 05/23/23  0414 05/25/23  0336   CPK 92268*   < > 8245*   TROPONINI 0.504*  --   --     < > = values in this interval not displayed.       Electrolytes: Electrolytes replaced  Accuchecks: none    Gtts:   dexmedeTOMIDine (Precedex) infusion (titrating) 0.2 mcg/kg/hr  (05/27/23 1647)    lactated ringers 100 mL/hr at 05/27/23 1505       LDA/Wounds:  Lines/Drains/Airways       Peripherally Inserted Central Catheter Line  Duration             PICC Triple Lumen 05/23/23 1605 right brachial 4 days              Drain  Duration                  NG/OG Tube 05/23/23 1545 16 Fr. Center mouth 4 days         Fecal Incontinence  05/24/23 1600 3 days    Male External Urinary Catheter 05/26/23 Medium 1 day              Airway  Duration                  Airway - Non-Surgical 05/23/23 1540 Endotracheal Tube 4 days              Peripheral Intravenous Line  Duration                  Peripheral IV - Single Lumen 05/23/23 1530 20 G Left Forearm 4 days                  Wounds: No  Wound care consulted: No

## 2023-05-27 NOTE — ASSESSMENT & PLAN NOTE
- admitted to [facility] to r/o status  - continuous EEG  - Off anesthetics  - continue Vimpat 200 mg BID  - continue Onfi 20 mg daily

## 2023-05-27 NOTE — PLAN OF CARE
[facility] Care Plan    POC reviewed with Yeison Andrade and family at 0300. Pt verbalized understanding. Questions and concerns addressed. No acute events overnight. Pt progressing toward goals. Will continue to monitor. See below and flowsheets for full assessment and VS info.     -Pt continuously attempting to extubate/get out of bed O/N, titrated fentanyl and started Precedex per provider to ensure pt safety and ventilator synchrony without compromising neuro exam  Pt pulling small, infrequent tidal volumes early on in shift w/o changes in oxygen saturation; with fentanyl running at 200(Precedex yet to be started) pt still attempting to extubate, RT's changed ventilator from spontaneous to SIMV      Is this a stroke patient? no    Neuro:  [place] Coma Scale  Best Eye Response: 4-->(E4) spontaneous  Best Motor Response: 6-->(M6) obeys commands  Best Verbal Response: 1-->(V1) none  [place] Coma Scale Score: 11  Assessment Qualifiers: patient chemically sedated or paralyzed, patient intubated  Pupil PERRLA: yes     24hr Temp:  [96.4 °F (35.8 °C)-100.2 °F (37.9 °C)]     CV:   Rhythm: normal sinus rhythm  BP goals:   SBP < 180  MAP > 65    Resp:      Vent Mode: SIMV  Set Rate: 16 BPM  Oxygen Concentration (%): 24  Vt Set: 500 mL  PEEP/CPAP: 5 cmH20  Pressure Support: 10 cmH20    Plan: wean to extubate    GI/:     Diet/Nutrition Received: tube feeding, NPO  Last Bowel Movement: 05/26/23  Voiding Characteristics: urethral catheter (bladder)    Intake/Output Summary (Last 24 hours) at 5/27/2023 0538  Last data filed at 5/27/2023 0501  Gross per 24 hour   Intake 4327.46 ml   Output 1400 ml   Net 2927.46 ml     Unmeasured Output  Stool Occurrence: 1    Labs/Accuchecks:  Recent Labs   Lab 05/27/23  0059   WBC 9.74   RBC 3.02*   HGB 8.7*   HCT 27.8*         Recent Labs   Lab 05/27/23  0059      K 3.3*   CO2 27      BUN 8   CREATININE 0.9   ALKPHOS 57   ALT 47*   AST 84*   BILITOT 1.3*    No results for  input(s): PROTIME, INR, APTT, HEPANTIXA in the last 168 hours.   Recent Labs   Lab 05/22/23  1840 05/23/23  0414 05/25/23  0336   CPK 02465*   < > 8245*   TROPONINI 0.504*  --   --     < > = values in this interval not displayed.       Electrolytes: Electrolytes replaced  Accuchecks: none    Gtts:   dexmedeTOMIDine (Precedex) infusion (titrating) 0.5 mcg/kg/hr (05/27/23 0501)    fentanyl 150 mcg/hr (05/27/23 0501)    lactated ringers 100 mL/hr at 05/27/23 0501    niCARdipine Stopped (05/26/23 0049)    propofol Stopped (05/26/23 1557)       LDA/Wounds:  Lines/Drains/Airways       Peripherally Inserted Central Catheter Line  Duration             PICC Triple Lumen 05/23/23 1605 right brachial 3 days              Drain  Duration                  NG/OG Tube 05/23/23 1545 16 Fr. Center mouth 3 days         Fecal Incontinence  05/24/23 1600 2 days    Male External Urinary Catheter 05/26/23 Medium 1 day              Airway  Duration                  Airway - Non-Surgical 05/23/23 1540 Endotracheal Tube 3 days              Peripheral Intravenous Line  Duration                  Peripheral IV - Single Lumen 05/23/23 1530 20 G Left Forearm 3 days                  Wounds: Yes  Wound care consulted: No

## 2023-05-27 NOTE — HOSPITAL COURSE
05/27/2023: Failed SBT due to apnea. Fentanyl gtt discontinued, continue Precedex. EEG negative for seizures.   05/28/2023: Tolerated SBT, extubated to room air. Tolerating well. EEG discontinued. IVF decreased.  05/29/2023 NAEON. Keppra decreased to 1000mg BID. ALONDRA resolved. Stable for step down to .

## 2023-05-27 NOTE — SUBJECTIVE & OBJECTIVE
Review of Systems: Unable to obtain a complete ROS due to level of consciousness.     Vitals:   Temp: 98.1 °F (36.7 °C)  Pulse: 84  Rhythm: normal sinus rhythm  BP: (!) 157/90  MAP (mmHg): 118  Resp: 16  SpO2: 100 %  Oxygen Concentration (%): 24  Vent Mode: SIMV  Set Rate: 16 BPM  Vt Set: 500 mL  Pressure Support: 5 cmH20  PEEP/CPAP: 5 cmH20  Peak Airway Pressure: 29 cmH20  Mean Airway Pressure: 9.8 cmH20  Plateau Pressure: 17 cmH20    Temp  Min: 97.9 °F (36.6 °C)  Max: 100.2 °F (37.9 °C)  Pulse  Min: 63  Max: 136  BP  Min: 109/62  Max: 199/113  MAP (mmHg)  Min: 79  Max: 147  Resp  Min: 9  Max: 24  SpO2  Min: 93 %  Max: 100 %  Oxygen Concentration (%)  Min: 24  Max: 24    05/26 0701 - 05/27 0700  In: 4351.6 [I.V.:2930]  Out: 2950 [Urine:2950]   Unmeasured Output  Stool Occurrence: 1     Examination:   Constitutional: Well-nourished and -developed. No apparent distress.   Eyes: Conjunctiva clear, anicteric. Lids no lesions.  Head/Ears/Nose/Mouth/Throat/Neck: Moist mucous membranes. External ears, nose atraumatic.   Cardiovascular: Regular rhythm. No leg edema.  Respiratory: Intubated. Comfortable respirations. Clear to auscultation.  Gastrointestinal: Soft, nondistended, nontender. + bowel sounds.    Neurologic:  -GCS E 4 V 1t M 6  -Alert. Intermittently falls asleep. Follows commands.  -Cranial nerves: EOM intact, PERRL, no facial droop, +cough   -Motor: Moves all extremities spontaneously, antigravity, R > L  -Sensation: Intact to light touch  Unable to test orientation, language, memory, judgment, insight, fund of knowledge, shoulder shrug, tongue protrusion, coordination, gait due to level of consciousness.    Medications:   ContinuousdexmedeTOMIDine (Precedex) infusion (titrating), Last Rate: Stopped (05/27/23 0945)  lactated ringers, Last Rate: 100 mL/hr at 05/27/23 1205    ScheduledamLODIPine, 10 mg, Daily  cloBAZam, 20 mg, BID  famotidine, 20 mg, BID  heparin (porcine), 5,000 Units, Q8H  lacosamide (VIMPAT)  IVPB, 200 mg, Q12H  levetiracetam IV, 2,000 mg, Q12H  piperacillin-tazobactam (ZOSYN) IVPB, 4.5 g, Q8H  vancomycin (VANCOCIN) IVPB, 1,000 mg, Q12H    PRNacetaminophen, 650 mg, Q6H PRN  fentanyl, 50 mcg, Q2H PRN  labetalol, 10 mg, Q4H PRN  magnesium oxide, 800 mg, PRN  magnesium oxide, 800 mg, PRN  potassium bicarbonate, 35 mEq, PRN  potassium bicarbonate, 50 mEq, PRN  potassium bicarbonate, 60 mEq, PRN  potassium, sodium phosphates, 2 packet, PRN  potassium, sodium phosphates, 2 packet, PRN  potassium, sodium phosphates, 2 packet, PRN  sodium chloride 0.9%, 10 mL, PRN  vancomycin - pharmacy to dose, , pharmacy to manage frequency       Today I independently reviewed pertinent medications, lines/drains/airways, imaging, cardiology results, laboratory results, microbiology results, notably:     ISTAT:   Recent Labs   Lab 05/27/23  0323   PH 7.490*   PCO2 34.0*   PO2 107*   POCSATURATED 99   HCO3 25.9   BE 3   POCTCO2 27   SAMPLE ARTERIAL      Chem:   Recent Labs   Lab 05/27/23  0059      K 3.3*      CO2 27   *   BUN 8   CREATININE 0.9   CALCIUM 8.4*   MG 1.6   PHOS 2.1*   ANIONGAP 8   PROT 5.1*   ALBUMIN 2.4*   BILITOT 1.3*   ALKPHOS 57   AST 84*   ALT 47*     Heme:   Recent Labs   Lab 05/27/23  0059   WBC 9.74   HGB 8.7*   HCT 27.8*        Endo: No results for input(s): POCTGLUCOSE in the last 24 hours.

## 2023-05-27 NOTE — ASSESSMENT & PLAN NOTE
- CXR daily  - ABG daily  - SBT this morning, failed due to apnea, decreased LOC  - Fentanyl gtt stopped

## 2023-05-27 NOTE — PROCEDURES
EEG REPORT      Yeison Andrade  [id number]  1985    DATE OF SERVICE: 5/26/2023     -2    METHODOLOGY      Extended electroencephalographic recording is made while the patient is ambulatory and continuing normal daily activities.  Electrodes are placed according to the International 10-20 placement system and included T1 and T2 electrode placement.  Twenty four (24) channels of digital signal (sampling rate of 512/sec) was simultaneously recorded from the scalp including EKG and eye monitors.  Recording band pass was 0.1 to 100 hz and all data was stored digitally on the recorder.  The patient is instructed to press an event button when clinical symptoms occur and write the symptoms into a diary. Activation procedures which include photic stimulation, hyperventilation and instructing patients to perform simple task are done in selected patients.        The EEG is displayed on a monitor screen and can be reformatted into different montages for evaluation.  The entire recoding is submitted for computer assisted analysis to detect spike and electrographic seizure activity.  The entire recording is visually reviewed and the times identified by computer analysis as being spikes or seizures are reviewed again.  Compresses spectral analysis (CSA) is also performed on the activity recorded from each individual channel.  This is displayed as a power display of frequencies from 0 to 30 Hz over time.   The CSA analysis is done and displayed continuously.  This is reviewed for asymmetries in power between homologous areas of the scalp and for presence of changes in power which canbe seen when seizures occur.  Sections of suspected abnormalities on the CSA is then compared with the original EEG recording.  .     [employer] software was also utilized in the review of this study.  This software suite analyzes the EEG recording in multiple domains.  Coherence and rhythmicity is computed to identify EEG sections which may  contain organized seizures.  Each channel undergoes analysis to detect presence of spike and sharp waves which have special and morphological characteristic of epileptic activity.  The routine EEG recording is converted from spacial into frequency domain.  This is then displayed comparing homologous areas to identify areas of significant asymmetry.  Algorithm to identify non-cortically generated artifact is used to separate eye movement, EMG and other artifact from the EEG     Recording Times  Start on 5/26/2023  Stop on 5/27/2023    A total of 23:23:51 hours of EEG was recorded.      EEG FINDINGS:  Background activity:   Propofol is stopped at 15:00    The background rhythm was characterized by polymorphic delta with over riding alpha and theta and generalized spindle activity.  Slowing is more prominent and less organized over the right hemisphere      Sleep:   No sleep transients were seen.    Activation procedures:   NA    Abnormal activity:   There are abundant sharp waves at T6, O2    IMPRESSION:   Abnormal EEG due to moderate right greater than left hemisphere dysfunction with seizure focus in the right posterior temporal-occipital region.  No electrographic seizures.      Spenser Hooper MD  Neurology-Epilepsy.  [facility].

## 2023-05-27 NOTE — PROGRESS NOTES
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 Was naked on presentation and incontinent of urine. Pt could not be identifed on presentation, was brought in by friend who also did not provide any information. Was given 10 mg Versed IM via EMS. Was obtunded and minimally responsive to painful stimuli.Pt intubated and sedated 5/23/23 in preparation for transfer to Neuro ICU. Had breakthrough seizure overnight despite sedation on Propofol and Versed, requiring Ativan push. EEG cap restarted this morning, discussed with Epileptologist at [facility] [place]. Spot EEG unable to be obtained as technician has limited availability at [facility]. Admitted to [facility] for continuous EEG and neuro monitoring.     Hospital Course: 05/27/2023: Failed SBT due to apnea. Fentanyl gtt discontinued, continue Precedex. EEG negative for seizures.     Review of Systems: Unable to obtain a complete ROS due to level of consciousness.     Vitals:   Temp: 98.1 °F (36.7 °C)  Pulse: 84  Rhythm: normal sinus rhythm  BP: (!) 157/90  MAP (mmHg): 118  Resp: 16  SpO2: 100 %  Oxygen Concentration (%): 24  Vent Mode: SIMV  Set Rate: 16 BPM  Vt Set: 500 mL  Pressure Support: 5 cmH20  PEEP/CPAP: 5 cmH20  Peak Airway Pressure: 29 cmH20  Mean Airway Pressure: 9.8 cmH20  Plateau Pressure: 17 cmH20    Temp  Min: 97.9 °F (36.6 °C)  Max: 100.2 °F (37.9 °C)  Pulse  Min: 63  Max: 136  BP  Min: 109/62  Max: 199/113  MAP (mmHg)  Min: 79  Max: 147  Resp  Min: 9  Max: 24  SpO2  Min: 93 %  Max: 100 %  Oxygen Concentration (%)  Min: 24  Max: 24    05/26 0701 - 05/27 0700  In: 4351.6 [I.V.:2930]  Out: 2950  [Urine:2950]   Unmeasured Output  Stool Occurrence: 1     Examination:   Constitutional: Well-nourished and -developed. No apparent distress.   Eyes: Conjunctiva clear, anicteric. Lids no lesions.  Head/Ears/Nose/Mouth/Throat/Neck: Moist mucous membranes. External ears, nose atraumatic.   Cardiovascular: Regular rhythm. No leg edema.  Respiratory: Intubated. Comfortable respirations. Clear to auscultation.  Gastrointestinal: Soft, nondistended, nontender. + bowel sounds.    Neurologic:  -GCS E 4 V 1t M 6  -Alert. Intermittently falls asleep. Follows commands.  -Cranial nerves: EOM intact, PERRL, no facial droop, +cough   -Motor: Moves all extremities spontaneously, antigravity, R > L  -Sensation: Intact to light touch  Unable to test orientation, language, memory, judgment, insight, fund of knowledge, shoulder shrug, tongue protrusion, coordination, gait due to level of consciousness.    Medications:   ContinuousdexmedeTOMIDine (Precedex) infusion (titrating), Last Rate: Stopped (05/27/23 0945)  lactated ringers, Last Rate: 100 mL/hr at 05/27/23 1205    ScheduledamLODIPine, 10 mg, Daily  cloBAZam, 20 mg, BID  famotidine, 20 mg, BID  heparin (porcine), 5,000 Units, Q8H  lacosamide (VIMPAT) IVPB, 200 mg, Q12H  levetiracetam IV, 2,000 mg, Q12H  piperacillin-tazobactam (ZOSYN) IVPB, 4.5 g, Q8H  vancomycin (VANCOCIN) IVPB, 1,000 mg, Q12H    PRNacetaminophen, 650 mg, Q6H PRN  fentanyl, 50 mcg, Q2H PRN  labetalol, 10 mg, Q4H PRN  magnesium oxide, 800 mg, PRN  magnesium oxide, 800 mg, PRN  potassium bicarbonate, 35 mEq, PRN  potassium bicarbonate, 50 mEq, PRN  potassium bicarbonate, 60 mEq, PRN  potassium, sodium phosphates, 2 packet, PRN  potassium, sodium phosphates, 2 packet, PRN  potassium, sodium phosphates, 2 packet, PRN  sodium chloride 0.9%, 10 mL, PRN  vancomycin - pharmacy to dose, , pharmacy to manage frequency       Today I independently reviewed pertinent medications, lines/drains/airways, imaging, cardiology  results, laboratory results, microbiology results, notably:     ISTAT:   Recent Labs   Lab 05/27/23  0323   PH 7.490*   PCO2 34.0*   PO2 107*   POCSATURATED 99   HCO3 25.9   BE 3   POCTCO2 27   SAMPLE ARTERIAL      Chem:   Recent Labs   Lab 05/27/23  0059      K 3.3*      CO2 27   *   BUN 8   CREATININE 0.9   CALCIUM 8.4*   MG 1.6   PHOS 2.1*   ANIONGAP 8   PROT 5.1*   ALBUMIN 2.4*   BILITOT 1.3*   ALKPHOS 57   AST 84*   ALT 47*     Heme:   Recent Labs   Lab 05/27/23  0059   WBC 9.74   HGB 8.7*   HCT 27.8*        Endo: No results for input(s): POCTGLUCOSE in the last 24 hours.     Assessment/Plan:     Neuro  * Status epilepticus  - admitted to [facility] to r/o status  - continuous EEG  - Off anesthetics  - continue Vimpat 200 mg BID  - continue Onfi 20 mg daily    Seizure  History of seizures  See Status epilepticus    Pulmonary  On mechanically assisted ventilation  - CXR daily  - ABG daily  - SBT this morning, failed due to apnea, decreased LOC  - Fentanyl gtt stopped    Cardiac/Vascular  HTN (hypertension)  SBP goal < 180    Renal/  ALONDRA (acute kidney injury)  Improving    Orthopedic  Non-traumatic rhabdomyolysis  - CK trending down  - daily CK  -  ml/hr    The patient is being Prophylaxed for:  Venous Thromboembolism with: Mechanical or Chemical  Stress Ulcer with: H2B  Ventilator Pneumonia with: chlorhexidine oral care    Activity Orders          Turn patient starting at 05/24 1600    Elevate HOB starting at 05/24 1519    Diet NPO: NPO starting at 05/24 1519        Full Code     Critical condition in that Patient has a condition that poses threat to life and bodily function: seizures, encephalopathy requiring mechanical ventilation     45 minutes of Critical care time was spent personally by me on the following activities: development of treatment plan with patient or surrogate and bedside caregivers, discussions with consultants, evaluation of patient's response to treatment,  examination of patient, ordering and performing treatments and interventions, ordering and review of laboratory studies, ordering and review of radiographic studies, pulse oximetry, antibiotic titration if applicable, vasopressor titration if applicable, re-evaluation of patient's condition. This critical care time did not overlap with that of any other provider or involve time for any procedures. There is high probability for acute neurological change leading to clinical and possibly life-threatening deterioration requiring highest level of physician preparedness for urgent intervention.    Emi Bain NP  Neurocritical Care  [facility] - Neuro Critical Care

## 2023-05-28 LAB
ABO + RH BLD: NORMAL
ALBUMIN SERPL BCP-MCNC: 2.6 G/DL (ref 3.5–5.2)
ALLENS TEST: ABNORMAL
ALP SERPL-CCNC: 75 U/L (ref 55–135)
ALT SERPL W/O P-5'-P-CCNC: 58 U/L (ref 10–44)
ANION GAP SERPL CALC-SCNC: 9 MMOL/L (ref 8–16)
AST SERPL-CCNC: 85 U/L (ref 10–40)
BASOPHILS # BLD AUTO: 0.01 K/UL (ref 0–0.2)
BASOPHILS NFR BLD: 0.1 % (ref 0–1.9)
BILIRUB SERPL-MCNC: 1.2 MG/DL (ref 0.1–1)
BLD GP AB SCN CELLS X3 SERPL QL: NORMAL
BUN SERPL-MCNC: 9 MG/DL (ref 6–20)
CALCIUM SERPL-MCNC: 9.2 MG/DL (ref 8.7–10.5)
CHLORIDE SERPL-SCNC: 110 MMOL/L (ref 95–110)
CK SERPL-CCNC: 2078 U/L (ref 20–200)
CO2 SERPL-SCNC: 24 MMOL/L (ref 23–29)
CREAT SERPL-MCNC: 0.8 MG/DL (ref 0.5–1.4)
DELSYS: ABNORMAL
DIFFERENTIAL METHOD: ABNORMAL
EOSINOPHIL # BLD AUTO: 0.1 K/UL (ref 0–0.5)
EOSINOPHIL NFR BLD: 1.5 % (ref 0–8)
ERYTHROCYTE [DISTWIDTH] IN BLOOD BY AUTOMATED COUNT: 12.5 % (ref 11.5–14.5)
ERYTHROCYTE [SEDIMENTATION RATE] IN BLOOD BY WESTERGREN METHOD: 16 MM/H
EST. GFR  (NO RACE VARIABLE): >60 ML/MIN/1.73 M^2
FIO2: 24
GLUCOSE SERPL-MCNC: 106 MG/DL (ref 70–110)
HCO3 UR-SCNC: 24.3 MMOL/L (ref 24–28)
HCT VFR BLD AUTO: 29.4 % (ref 40–54)
HGB BLD-MCNC: 9.5 G/DL (ref 14–18)
IMM GRANULOCYTES # BLD AUTO: 0.04 K/UL (ref 0–0.04)
IMM GRANULOCYTES NFR BLD AUTO: 0.4 % (ref 0–0.5)
LYMPHOCYTES # BLD AUTO: 1.4 K/UL (ref 1–4.8)
LYMPHOCYTES NFR BLD: 14.8 % (ref 18–48)
MAGNESIUM SERPL-MCNC: 1.5 MG/DL (ref 1.6–2.6)
MCH RBC QN AUTO: 29.5 PG (ref 27–31)
MCHC RBC AUTO-ENTMCNC: 32.3 G/DL (ref 32–36)
MCV RBC AUTO: 91 FL (ref 82–98)
MODE: ABNORMAL
MONOCYTES # BLD AUTO: 0.5 K/UL (ref 0.3–1)
MONOCYTES NFR BLD: 5.7 % (ref 4–15)
NEUTROPHILS # BLD AUTO: 7.1 K/UL (ref 1.8–7.7)
NEUTROPHILS NFR BLD: 77.5 % (ref 38–73)
NRBC BLD-RTO: 0 /100 WBC
PCO2 BLDA: 37.1 MMHG (ref 35–45)
PEEP: 5
PH SMN: 7.42 [PH] (ref 7.35–7.45)
PHOSPHATE SERPL-MCNC: 2.7 MG/DL (ref 2.7–4.5)
PLATELET # BLD AUTO: 213 K/UL (ref 150–450)
PMV BLD AUTO: 10.5 FL (ref 9.2–12.9)
PO2 BLDA: 117 MMHG (ref 80–100)
POC BE: 0 MMOL/L
POC SATURATED O2: 99 % (ref 95–100)
POC TCO2: 25 MMOL/L (ref 23–27)
POTASSIUM SERPL-SCNC: 3.7 MMOL/L (ref 3.5–5.1)
PROT SERPL-MCNC: 5.8 G/DL (ref 6–8.4)
RBC # BLD AUTO: 3.22 M/UL (ref 4.6–6.2)
SAMPLE: ABNORMAL
SITE: ABNORMAL
SODIUM SERPL-SCNC: 143 MMOL/L (ref 136–145)
SPECIMEN OUTDATE: NORMAL
VANCOMYCIN TROUGH SERPL-MCNC: 9.9 UG/ML (ref 10–22)
VT: 500
WBC # BLD AUTO: 9.22 K/UL (ref 3.9–12.7)

## 2023-05-28 PROCEDURE — 80053 COMPREHEN METABOLIC PANEL: CPT | Performed by: NURSE PRACTITIONER

## 2023-05-28 PROCEDURE — 25000003 PHARM REV CODE 250: Performed by: PHYSICIAN ASSISTANT

## 2023-05-28 PROCEDURE — 25000003 PHARM REV CODE 250: Performed by: PSYCHIATRY & NEUROLOGY

## 2023-05-28 PROCEDURE — 86900 BLOOD TYPING SEROLOGIC ABO: CPT | Performed by: PHYSICIAN ASSISTANT

## 2023-05-28 PROCEDURE — 94761 N-INVAS EAR/PLS OXIMETRY MLT: CPT

## 2023-05-28 PROCEDURE — 83735 ASSAY OF MAGNESIUM: CPT | Performed by: NURSE PRACTITIONER

## 2023-05-28 PROCEDURE — 99291 PR CRITICAL CARE, E/M 30-74 MINUTES: ICD-10-PCS | Mod: ,,, | Performed by: PSYCHIATRY & NEUROLOGY

## 2023-05-28 PROCEDURE — 20000000 HC ICU ROOM

## 2023-05-28 PROCEDURE — 95718 EEG PHYS/QHP 2-12 HR W/VEEG: CPT | Mod: ,,, | Performed by: PSYCHIATRY & NEUROLOGY

## 2023-05-28 PROCEDURE — 36415 COLL VENOUS BLD VENIPUNCTURE: CPT | Performed by: PSYCHIATRY & NEUROLOGY

## 2023-05-28 PROCEDURE — 36600 WITHDRAWAL OF ARTERIAL BLOOD: CPT

## 2023-05-28 PROCEDURE — 99900017 HC EXTUBATION W/PARAMETERS (STAT)

## 2023-05-28 PROCEDURE — 80202 ASSAY OF VANCOMYCIN: CPT | Performed by: PSYCHIATRY & NEUROLOGY

## 2023-05-28 PROCEDURE — 25000003 PHARM REV CODE 250: Performed by: NURSE PRACTITIONER

## 2023-05-28 PROCEDURE — 63600175 PHARM REV CODE 636 W HCPCS: Performed by: PSYCHIATRY & NEUROLOGY

## 2023-05-28 PROCEDURE — 85025 COMPLETE CBC W/AUTO DIFF WBC: CPT | Performed by: NURSE PRACTITIONER

## 2023-05-28 PROCEDURE — 27201109 HC SYSTEM FECAL MANAGEMENT

## 2023-05-28 PROCEDURE — 82803 BLOOD GASES ANY COMBINATION: CPT

## 2023-05-28 PROCEDURE — 99291 CRITICAL CARE FIRST HOUR: CPT | Mod: ,,, | Performed by: PSYCHIATRY & NEUROLOGY

## 2023-05-28 PROCEDURE — 84100 ASSAY OF PHOSPHORUS: CPT | Performed by: NURSE PRACTITIONER

## 2023-05-28 PROCEDURE — 94150 VITAL CAPACITY TEST: CPT

## 2023-05-28 PROCEDURE — 99900035 HC TECH TIME PER 15 MIN (STAT)

## 2023-05-28 PROCEDURE — 63600175 PHARM REV CODE 636 W HCPCS: Performed by: NURSE PRACTITIONER

## 2023-05-28 PROCEDURE — 99900026 HC AIRWAY MAINTENANCE (STAT)

## 2023-05-28 PROCEDURE — C9254 INJECTION, LACOSAMIDE: HCPCS | Performed by: NURSE PRACTITIONER

## 2023-05-28 PROCEDURE — 95718 PR EEG, W/VIDEO, CONT RECORD, I&R, 2-12 HRS: ICD-10-PCS | Mod: ,,, | Performed by: PSYCHIATRY & NEUROLOGY

## 2023-05-28 PROCEDURE — 82550 ASSAY OF CK (CPK): CPT | Performed by: PHYSICIAN ASSISTANT

## 2023-05-28 PROCEDURE — 94010 BREATHING CAPACITY TEST: CPT

## 2023-05-28 PROCEDURE — 27000221 HC OXYGEN, UP TO 24 HOURS

## 2023-05-28 RX ORDER — ACETAMINOPHEN 325 MG/1
650 TABLET ORAL EVERY 6 HOURS PRN
Status: DISCONTINUED | OUTPATIENT
Start: 2023-05-28 | End: 2023-05-31 | Stop reason: HOSPADM

## 2023-05-28 RX ORDER — CLOBAZAM 2.5 MG/ML
20 SUSPENSION ORAL 2 TIMES DAILY
Status: DISCONTINUED | OUTPATIENT
Start: 2023-05-28 | End: 2023-05-31 | Stop reason: HOSPADM

## 2023-05-28 RX ORDER — FAMOTIDINE 20 MG/1
20 TABLET, FILM COATED ORAL 2 TIMES DAILY
Status: DISCONTINUED | OUTPATIENT
Start: 2023-05-28 | End: 2023-05-28

## 2023-05-28 RX ORDER — AMLODIPINE BESYLATE 10 MG/1
10 TABLET ORAL DAILY
Status: DISCONTINUED | OUTPATIENT
Start: 2023-05-29 | End: 2023-05-31 | Stop reason: HOSPADM

## 2023-05-28 RX ORDER — LANOLIN ALCOHOL/MO/W.PET/CERES
800 CREAM (GRAM) TOPICAL
Status: DISCONTINUED | OUTPATIENT
Start: 2023-05-28 | End: 2023-05-29

## 2023-05-28 RX ORDER — SODIUM,POTASSIUM PHOSPHATES 280-250MG
2 POWDER IN PACKET (EA) ORAL
Status: DISCONTINUED | OUTPATIENT
Start: 2023-05-28 | End: 2023-05-29

## 2023-05-28 RX ORDER — SULFAMETHOXAZOLE AND TRIMETHOPRIM 800; 160 MG/1; MG/1
2 TABLET ORAL 2 TIMES DAILY
Status: DISCONTINUED | OUTPATIENT
Start: 2023-05-28 | End: 2023-05-30

## 2023-05-28 RX ORDER — DEXMEDETOMIDINE HYDROCHLORIDE 4 UG/ML
0-1.4 INJECTION, SOLUTION INTRAVENOUS CONTINUOUS
Status: DISCONTINUED | OUTPATIENT
Start: 2023-05-28 | End: 2023-05-28

## 2023-05-28 RX ADMIN — SULFAMETHOXAZOLE AND TRIMETHOPRIM 2 TABLET: 800; 160 TABLET ORAL at 01:05

## 2023-05-28 RX ADMIN — CLOBAZAM 20 MG: 2.5 SUSPENSION ORAL at 08:05

## 2023-05-28 RX ADMIN — FENTANYL CITRATE 50 MCG: 50 INJECTION, SOLUTION INTRAMUSCULAR; INTRAVENOUS at 03:05

## 2023-05-28 RX ADMIN — LACOSAMIDE 200 MG: 10 INJECTION INTRAVENOUS at 04:05

## 2023-05-28 RX ADMIN — VANCOMYCIN HYDROCHLORIDE 1000 MG: 1 INJECTION, POWDER, LYOPHILIZED, FOR SOLUTION INTRAVENOUS at 11:05

## 2023-05-28 RX ADMIN — LACOSAMIDE 200 MG: 10 INJECTION INTRAVENOUS at 05:05

## 2023-05-28 RX ADMIN — LEVETIRACETAM 2000 MG: 100 INJECTION, SOLUTION INTRAVENOUS at 08:05

## 2023-05-28 RX ADMIN — HEPARIN SODIUM 5000 UNITS: 5000 INJECTION INTRAVENOUS; SUBCUTANEOUS at 10:05

## 2023-05-28 RX ADMIN — DEXMEDETOMIDINE HYDROCHLORIDE 0.2 MCG/KG/HR: 4 INJECTION, SOLUTION INTRAVENOUS at 11:05

## 2023-05-28 RX ADMIN — SULFAMETHOXAZOLE AND TRIMETHOPRIM 2 TABLET: 800; 160 TABLET ORAL at 08:05

## 2023-05-28 RX ADMIN — Medication 800 MG: at 03:05

## 2023-05-28 RX ADMIN — Medication 800 MG: at 08:05

## 2023-05-28 RX ADMIN — HEPARIN SODIUM 5000 UNITS: 5000 INJECTION INTRAVENOUS; SUBCUTANEOUS at 06:05

## 2023-05-28 RX ADMIN — AMLODIPINE BESYLATE 10 MG: 10 TABLET ORAL at 08:05

## 2023-05-28 RX ADMIN — HEPARIN SODIUM 5000 UNITS: 5000 INJECTION INTRAVENOUS; SUBCUTANEOUS at 01:05

## 2023-05-28 RX ADMIN — PIPERACILLIN SODIUM AND TAZOBACTAM SODIUM 4.5 G: 4; .5 INJECTION, POWDER, FOR SOLUTION INTRAVENOUS at 03:05

## 2023-05-28 RX ADMIN — FAMOTIDINE 20 MG: 20 TABLET, FILM COATED ORAL at 08:05

## 2023-05-28 RX ADMIN — DEXMEDETOMIDINE HYDROCHLORIDE 0.9 MCG/KG/HR: 4 INJECTION, SOLUTION INTRAVENOUS at 02:05

## 2023-05-28 RX ADMIN — POTASSIUM BICARBONATE 50 MEQ: 978 TABLET, EFFERVESCENT ORAL at 03:05

## 2023-05-28 RX ADMIN — VANCOMYCIN HYDROCHLORIDE 1000 MG: 1 INJECTION, POWDER, LYOPHILIZED, FOR SOLUTION INTRAVENOUS at 12:05

## 2023-05-28 NOTE — ASSESSMENT & PLAN NOTE
- admitted to [facility] to r/o status  - Off anesthetics  - continue Vimpat 200 mg BID  - continue Onfi 20 mg daily  - EEG continues to be negative, discontinued

## 2023-05-28 NOTE — SUBJECTIVE & OBJECTIVE
Review of Systems: Review of Systems   Eyes:  Negative for blurred vision, double vision and photophobia.   Gastrointestinal:  Negative for heartburn, nausea and vomiting.   Neurological:  Negative for focal weakness, seizures, loss of consciousness and weakness.        Hoarse post extubation     Vitals:   Temp: 98.3 °F (36.8 °C)  Pulse: 66  Rhythm: normal sinus rhythm  BP: (!) 154/97  MAP (mmHg): 119  Resp: (!) 7  SpO2: 100 %  Oxygen Concentration (%): 24  Vent Mode: Spont  Set Rate: 12 BPM  Vt Set: 500 mL  Pressure Support: 5 cmH20  PEEP/CPAP: 5 cmH20  Peak Airway Pressure: 10 cmH20  Mean Airway Pressure: 7.3 cmH20  Plateau Pressure: 17 cmH20    Temp  Min: 98.1 °F (36.7 °C)  Max: 98.3 °F (36.8 °C)  Pulse  Min: 53  Max: 124  BP  Min: 118/67  Max: 182/92  MAP (mmHg)  Min: 87  Max: 131  Resp  Min: 7  Max: 22  SpO2  Min: 99 %  Max: 100 %  Oxygen Concentration (%)  Min: 24  Max: 24    05/27 0701 - 05/28 0700  In: 2758.8 [I.V.:2063]  Out: 3595 [Urine:3195]   Unmeasured Output  Stool Occurrence: 1  Pad Count: 1     Examination:   Constitutional: Well-nourished and -developed. No apparent distress.   Eyes: Conjunctiva clear, anicteric. Lids no lesions.  Head/Ears/Nose/Mouth/Throat/Neck: Moist mucous membranes. External ears, nose atraumatic.   Cardiovascular: Regular rhythm. No leg edema.  Respiratory: Comfortable respirations. Clear to auscultation.  Gastrointestinal: Soft, nondistended, nontender. + bowel sounds.    Neurologic:  -GCS E 4 V 4 M 6  -Alert. Oriented to self, place. Follows commands.  -Cranial nerves: EOM intact, PERRL, no facial droop, +cough   -Motor: Moves all extremities spontaneously, antigravity, R > L  -Sensation: Intact to light touch    Medications:   ContinuousdexmedeTOMIDine (Precedex) infusion (titrating)  lactated ringers, Last Rate: 75 mL/hr at 05/28/23 1005    ScheduledamLODIPine, 10 mg, Daily  cloBAZam, 20 mg, BID  famotidine, 20 mg, BID  heparin (porcine), 5,000 Units, Q8H  lacosamide  (VIMPAT) IVPB, 200 mg, Q12H  levetiracetam IV, 2,000 mg, Q12H  piperacillin-tazobactam (ZOSYN) IVPB, 4.5 g, Q8H  vancomycin (VANCOCIN) IVPB, 1,250 mg, Q12H    PRNacetaminophen, 650 mg, Q6H PRN  fentaNYL, 50 mcg, Q1H PRN  labetalol, 10 mg, Q4H PRN  magnesium oxide, 800 mg, PRN  magnesium oxide, 800 mg, PRN  potassium bicarbonate, 35 mEq, PRN  potassium bicarbonate, 50 mEq, PRN  potassium bicarbonate, 60 mEq, PRN  potassium, sodium phosphates, 2 packet, PRN  potassium, sodium phosphates, 2 packet, PRN  potassium, sodium phosphates, 2 packet, PRN  sodium chloride 0.9%, 10 mL, PRN  vancomycin - pharmacy to dose, , pharmacy to manage frequency       Today I independently reviewed pertinent medications, lines/drains/airways, imaging, cardiology results, laboratory results, microbiology results, notably:     ISTAT:   Recent Labs   Lab 05/28/23  0342   PH 7.423   PCO2 37.1   PO2 117*   POCSATURATED 99   HCO3 24.3   BE 0   POCTCO2 25   SAMPLE ARTERIAL      Chem:   Recent Labs   Lab 05/28/23  0045      K 3.7      CO2 24      BUN 9   CREATININE 0.8   CALCIUM 9.2   MG 1.5*   PHOS 2.7   ANIONGAP 9   PROT 5.8*   ALBUMIN 2.6*   BILITOT 1.2*   ALKPHOS 75   AST 85*   ALT 58*     Heme:   Recent Labs   Lab 05/28/23  0045   WBC 9.22   HGB 9.5*   HCT 29.4*        Endo: No results for input(s): POCTGLUCOSE in the last 24 hours.

## 2023-05-28 NOTE — PROGRESS NOTES
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 Was naked on presentation and incontinent of urine. Pt could not be identifed on presentation, was brought in by friend who also did not provide any information. Was given 10 mg Versed IM via EMS. Was obtunded and minimally responsive to painful stimuli.Pt intubated and sedated 5/23/23 in preparation for transfer to Neuro ICU. Had breakthrough seizure overnight despite sedation on Propofol and Versed, requiring Ativan push. EEG cap restarted this morning, discussed with Epileptologist at [facility]. Spot EEG unable to be obtained as technician has limited availability at [facility]. Admitted to [facility] for continuous EEG and neuro monitoring.     Hospital Course: 05/27/2023: Failed SBT due to apnea. Fentanyl gtt discontinued, continue Precedex. EEG negative for seizures.   05/28/2023: Tolerated SBT, extubated to room air. Tolerating well. EEG discontinued. IVF decreased.    Review of Systems: Review of Systems   Eyes:  Negative for blurred vision, double vision and photophobia.   Gastrointestinal:  Negative for heartburn, nausea and vomiting.   Neurological:  Negative for focal weakness, seizures, loss of consciousness and weakness.        Hoarse post extubation     Vitals:   Temp: 98.3 °F (36.8 °C)  Pulse: 66  Rhythm: normal sinus rhythm  BP: (!) 154/97  MAP (mmHg): 119  Resp: (!) 7  SpO2: 100 %  Oxygen Concentration (%): 24  Vent Mode: Spont  Set Rate: 12 BPM  Vt Set: 500 mL  Pressure Support: 5 cmH20  PEEP/CPAP: 5 cmH20  Peak Airway Pressure: 10 cmH20  Mean Airway Pressure: 7.3 cmH20  Plateau  Pressure: 17 cmH20    Temp  Min: 98.1 °F (36.7 °C)  Max: 98.3 °F (36.8 °C)  Pulse  Min: 53  Max: 124  BP  Min: 118/67  Max: 182/92  MAP (mmHg)  Min: 87  Max: 131  Resp  Min: 7  Max: 22  SpO2  Min: 99 %  Max: 100 %  Oxygen Concentration (%)  Min: 24  Max: 24    05/27 0701 - 05/28 0700  In: 2758.8 [I.V.:2063]  Out: 3595 [Urine:3195]   Unmeasured Output  Stool Occurrence: 1  Pad Count: 1     Examination:   Constitutional: Well-nourished and -developed. No apparent distress.   Eyes: Conjunctiva clear, anicteric. Lids no lesions.  Head/Ears/Nose/Mouth/Throat/Neck: Moist mucous membranes. External ears, nose atraumatic.   Cardiovascular: Regular rhythm. No leg edema.  Respiratory: Comfortable respirations. Clear to auscultation.  Gastrointestinal: Soft, nondistended, nontender. + bowel sounds.    Neurologic:  -GCS E 4 V 4 M 6  -Alert. Oriented to self, place. Follows commands.  -Cranial nerves: EOM intact, PERRL, no facial droop, +cough   -Motor: Moves all extremities spontaneously, antigravity, R > L  -Sensation: Intact to light touch    Medications:   ContinuousdexmedeTOMIDine (Precedex) infusion (titrating)  lactated ringers, Last Rate: 75 mL/hr at 05/28/23 1005    ScheduledamLODIPine, 10 mg, Daily  cloBAZam, 20 mg, BID  famotidine, 20 mg, BID  heparin (porcine), 5,000 Units, Q8H  lacosamide (VIMPAT) IVPB, 200 mg, Q12H  levetiracetam IV, 2,000 mg, Q12H  piperacillin-tazobactam (ZOSYN) IVPB, 4.5 g, Q8H  vancomycin (VANCOCIN) IVPB, 1,250 mg, Q12H    PRNacetaminophen, 650 mg, Q6H PRN  fentaNYL, 50 mcg, Q1H PRN  labetalol, 10 mg, Q4H PRN  magnesium oxide, 800 mg, PRN  magnesium oxide, 800 mg, PRN  potassium bicarbonate, 35 mEq, PRN  potassium bicarbonate, 50 mEq, PRN  potassium bicarbonate, 60 mEq, PRN  potassium, sodium phosphates, 2 packet, PRN  potassium, sodium phosphates, 2 packet, PRN  potassium, sodium phosphates, 2 packet, PRN  sodium chloride 0.9%, 10 mL, PRN  vancomycin - pharmacy to dose, , pharmacy to manage  frequency       Today I independently reviewed pertinent medications, lines/drains/airways, imaging, cardiology results, laboratory results, microbiology results, notably:     ISTAT:   Recent Labs   Lab 05/28/23  0342   PH 7.423   PCO2 37.1   PO2 117*   POCSATURATED 99   HCO3 24.3   BE 0   POCTCO2 25   SAMPLE ARTERIAL      Chem:   Recent Labs   Lab 05/28/23  0045      K 3.7      CO2 24      BUN 9   CREATININE 0.8   CALCIUM 9.2   MG 1.5*   PHOS 2.7   ANIONGAP 9   PROT 5.8*   ALBUMIN 2.6*   BILITOT 1.2*   ALKPHOS 75   AST 85*   ALT 58*     Heme:   Recent Labs   Lab 05/28/23  0045   WBC 9.22   HGB 9.5*   HCT 29.4*        Endo: No results for input(s): POCTGLUCOSE in the last 24 hours.       Assessment/Plan:     Neuro  * Status epilepticus  - admitted to [facility] to r/o status  - Off anesthetics  - continue Vimpat 200 mg BID  - continue Onfi 20 mg daily  - EEG continues to be negative, discontinued    Seizure  History of seizures  See Status epilepticus    Pulmonary  On mechanically assisted ventilation  - CXR daily  - ABG daily  - Tolerated SBT, extubated to room air 5/28    Cardiac/Vascular  HTN (hypertension)  SBP goal < 180    Renal/  ALONDRA (acute kidney injury)  Resolved    Orthopedic  Non-traumatic rhabdomyolysis  - CK trending down  - daily CK  - LR 75 ml/hr    The patient is being Prophylaxed for:  Venous Thromboembolism with: Mechanical or Chemical  Stress Ulcer with: Not Applicable   Ventilator Pneumonia with: not applicable    Activity Orders          Turn patient starting at 05/24 1600    Elevate HOB starting at 05/24 1519    Diet NPO: NPO starting at 05/24 1519        Full Code     Critical condition in that Patient has a condition that poses threat to life and bodily function: seizures, encephalopathy requiring mechanical ventilation, extubation, hemodynamic monitoring     40 minutes of Critical care time was spent personally by me on the following activities: development of treatment  plan with patient or surrogate and bedside caregivers, discussions with consultants, evaluation of patient's response to treatment, examination of patient, ordering and performing treatments and interventions, ordering and review of laboratory studies, ordering and review of radiographic studies, pulse oximetry, antibiotic titration if applicable, vasopressor titration if applicable, re-evaluation of patient's condition. This critical care time did not overlap with that of any other provider or involve time for any procedures. There is high probability for acute neurological change leading to clinical and possibly life-threatening deterioration requiring highest level of physician preparedness for urgent intervention.    Emi Bain NP  Neurocritical Care  [facility] - Neuro Critical Care

## 2023-05-28 NOTE — PLAN OF CARE
[facility] Care Plan    POC reviewed with Yeison Andrade and family at 0400. Pt unable to verbalize understanding due to ett, but nods appropiately. Questions and concerns addressed. No acute events overnight. Pt progressing toward goals. Will continue to monitor. See below and flowsheets for full assessment and VS info.       -tube feed held  -Possible extubation AM  -Chest xray/ ABG completed  -No clinical seizures noted  -Vanc trough for 1100        Is this a stroke patient? no    Neuro:  Daquan Coma Scale  Best Eye Response: 4-->(E4) spontaneous  Best Motor Response: 6-->(M6) obeys commands  Best Verbal Response: 1-->(V1) none  Daquan Coma Scale Score: 11  Assessment Qualifiers: patient intubated, patient chemically sedated or paralyzed  Pupil PERRLA: yes     24hr Temp:  [98.1 °F (36.7 °C)-99.3 °F (37.4 °C)]     CV:   Rhythm: normal sinus rhythm  BP goals:   SBP < 180  MAP > 65    Resp:      Vent Mode: SIMV  Set Rate: 16 BPM  Oxygen Concentration (%): 24  Vt Set: 500 mL  PEEP/CPAP: 5 cmH20  Pressure Support: 5 cmH20    Plan: wean to extubate    GI/:     Diet/Nutrition Received: NPO, tube feeding  Last Bowel Movement: 05/27/23  Voiding Characteristics: voids spontaneously without difficulty, external catheter    Intake/Output Summary (Last 24 hours) at 5/28/2023 0442  Last data filed at 5/28/2023 0207  Gross per 24 hour   Intake 3081.54 ml   Output 4945 ml   Net -1863.46 ml     Unmeasured Output  Stool Occurrence: 1  Pad Count: 1    Labs/Accuchecks:  Recent Labs   Lab 05/28/23  0045   WBC 9.22   RBC 3.22*   HGB 9.5*   HCT 29.4*         Recent Labs   Lab 05/28/23  0045      K 3.7   CO2 24      BUN 9   CREATININE 0.8   ALKPHOS 75   ALT 58*   AST 85*   BILITOT 1.2*    No results for input(s): PROTIME, INR, APTT, HEPANTIXA in the last 168 hours.   Recent Labs   Lab 05/22/23  1840 05/23/23  0414 05/28/23  0045   CPK 02697*   < > 2078*   TROPONINI 0.504*  --   --     < > = values in this interval not  displayed.       Electrolytes: Electrolytes replaced  Accuchecks: ACHS    Gtts:   dexmedeTOMIDine (Precedex) infusion (titrating) 0.9 mcg/kg/hr (05/28/23 0220)    lactated ringers 100 mL/hr at 05/28/23 0108       LDA/Wounds:  Lines/Drains/Airways       Peripherally Inserted Central Catheter Line  Duration             PICC Triple Lumen 05/23/23 1605 right brachial 4 days              Drain  Duration                  NG/OG Tube 05/23/23 1545 16 Fr. Center mouth 4 days    Male External Urinary Catheter 05/26/23 Medium 2 days         Fecal Incontinence  05/27/23 1 day              Airway  Duration                  Airway - Non-Surgical 05/23/23 1540 Endotracheal Tube 4 days              Peripheral Intravenous Line  Duration                  Peripheral IV - Single Lumen 05/23/23 1530 20 G Left Forearm 4 days                  Wounds: No  Wound care consulted: No

## 2023-05-28 NOTE — PROCEDURES
EEG REPORT      Yeison Andrade  [id number]  1985    DATE OF SERVICE: 5/27/2023     -3    METHODOLOGY      Extended electroencephalographic recording is made while the patient is ambulatory and continuing normal daily activities.  Electrodes are placed according to the International 10-20 placement system and included T1 and T2 electrode placement.  Twenty four (24) channels of digital signal (sampling rate of 512/sec) was simultaneously recorded from the scalp including EKG and eye monitors.  Recording band pass was 0.1 to 100 hz and all data was stored digitally on the recorder.  The patient is instructed to press an event button when clinical symptoms occur and write the symptoms into a diary. Activation procedures which include photic stimulation, hyperventilation and instructing patients to perform simple task are done in selected patients.        The EEG is displayed on a monitor screen and can be reformatted into different montages for evaluation.  The entire recoding is submitted for computer assisted analysis to detect spike and electrographic seizure activity.  The entire recording is visually reviewed and the times identified by computer analysis as being spikes or seizures are reviewed again.  Compresses spectral analysis (CSA) is also performed on the activity recorded from each individual channel.  This is displayed as a power display of frequencies from 0 to 30 Hz over time.   The CSA analysis is done and displayed continuously.  This is reviewed for asymmetries in power between homologous areas of the scalp and for presence of changes in power which canbe seen when seizures occur.  Sections of suspected abnormalities on the CSA is then compared with the original EEG recording.  .     Clear[employer]erve software was also utilized in the review of this study.  This software suite analyzes the EEG recording in multiple domains.  Coherence and rhythmicity is computed to identify EEG sections which may  contain organized seizures.  Each channel undergoes analysis to detect presence of spike and sharp waves which have special and morphological characteristic of epileptic activity.  The routine EEG recording is converted from spacial into frequency domain.  This is then displayed comparing homologous areas to identify areas of significant asymmetry.  Algorithm to identify non-cortically generated artifact is used to separate eye movement, EMG and other artifact from the EEG     Recording Times  Start on 5/28/2023  Stop on 5/29/2023    A total of 23:47:46 hours of EEG was recorded.      EEG FINDINGS:  Background activity:     The background rhythm was characterized by polymorphic delta with over riding alpha and theta and generalized spindle activity.  Slowing is more prominent and less organized over the right hemisphere.  Slower frequencies over the left hemisphere resolve over the course of the recording with no posterior dominant rhythm.     Sleep:   No sleep transients although there is cycling of the background.    Activation procedures:   NA    Abnormal activity:   There are abundant sharp waves at T6, O2    IMPRESSION:   Abnormal EEG due to mild left and moderate right hemisphere dysfunction with seizure focus in the right posterior temporal-occipital region.  No electrographic seizures.  There is some degree of improvement over the course of the study.      Spenser Hooper MD  Neurology-Epilepsy.  [facility].

## 2023-05-28 NOTE — PROGRESS NOTES
Pharmacokinetic Sign-off: IV Vancomycin    Therapy with Vancomycin complete and/or consult discontinued by provider.    Pharmacy will sign off, please re-consult as needed.    Danette Hills, PharmD  Ext: [phone]

## 2023-05-28 NOTE — PLAN OF CARE
[facility] Care Plan    POC reviewed with Yeison Andrade and family at 1400. Pt verbalized understanding. Questions and concerns addressed. No acute events today. Pt progressing toward goals. Will continue to monitor. See below and flowsheets for full assessment and VS info.   - cEEG removed  - Extubated on room air   - Fecal incontinence  changed   - Regular diet started    - LR at 75mL/hr          Is this a stroke patient? no    Neuro:  Daquan Coma Scale  Best Eye Response: 4-->(E4) spontaneous  Best Motor Response: 6-->(M6) obeys commands  Best Verbal Response: 5-->(V5) oriented  [place] Coma Scale Score: 15  Assessment Qualifiers: patient not sedated/intubated  Pupil PERRLA: yes     24 hr Temp:  [98.2 °F (36.8 °C)-98.5 °F (36.9 °C)]     CV:   Rhythm: normal sinus rhythm  BP goals:   SBP < 180  MAP > 65    Resp:      Vent Mode: Spont  Set Rate: 12 BPM  Oxygen Concentration (%): 24  Vt Set: 500 mL  PEEP/CPAP: 5 cmH20  Pressure Support: 5 cmH20    Plan: N/A    GI/:     Diet/Nutrition Received: NPO, tube feeding  Last Bowel Movement: 05/28/23  Voiding Characteristics: urethral catheter (bladder)    Intake/Output Summary (Last 24 hours) at 5/28/2023 1803  Last data filed at 5/28/2023 1705  Gross per 24 hour   Intake 3231.62 ml   Output 3620 ml   Net -388.38 ml     Unmeasured Output  Urine Occurrence: 2  Stool Occurrence: 2  Pad Count: 2    Labs/Accuchecks:  Recent Labs   Lab 05/28/23  0045   WBC 9.22   RBC 3.22*   HGB 9.5*   HCT 29.4*         Recent Labs   Lab 05/28/23  0045      K 3.7   CO2 24      BUN 9   CREATININE 0.8   ALKPHOS 75   ALT 58*   AST 85*   BILITOT 1.2*    No results for input(s): PROTIME, INR, APTT, HEPANTIXA in the last 168 hours.   Recent Labs   Lab 05/22/23  1840 05/23/23  0414 05/28/23  0045   CPK 84267*   < > 2078*   TROPONINI 0.504*  --   --     < > = values in this interval not displayed.       Electrolytes: Electrolytes replaced  Accuchecks: none    Gtts:    dexmedeTOMIDine (Precedex) infusion (titrating) Stopped (05/28/23 1426)    lactated ringers 75 mL/hr at 05/28/23 1705       LDA/Wounds:  Lines/Drains/Airways       Peripherally Inserted Central Catheter Line  Duration             PICC Triple Lumen 05/23/23 1605 right brachial 5 days              Drain  Duration             Male External Urinary Catheter 05/26/23 Medium 2 days         Fecal Incontinence  05/28/23 1429 <1 day              Peripheral Intravenous Line  Duration                  Peripheral IV - Single Lumen 05/23/23 1530 20 G Left Forearm 5 days                  Wounds: No  Wound care consulted: No

## 2023-05-28 NOTE — PROGRESS NOTES
Pharmacokinetic Assessment Follow Up: IV Vancomycin    Vancomycin serum concentration assessment/plan:  The trough level was drawn correctly and is below the desired definitive target range of 10 to 20 mcg/mL.  Renal function stable; UOP ~3.1L (~1.9 mL/kg/hr) in the past 24 hours  Change regimen to Vancomycin 1250 mg IV every 12 hours with next serum trough concentration measured at 1000 prior to 4th dose on 5/30.    Drug levels (last 3 results):  Recent Labs   Lab Result Units 05/27/23 0059 05/28/23  1102   Vancomycin-Trough ug/mL 6.5* 9.9*       Pharmacy will continue to follow and monitor vancomycin.    Please contact pharmacy at extension [phone] for questions regarding this assessment.    Thank you for the consult,   Danette Hills, PharmD       Patient brief summary:  Yeison Andrade is a 38 y.o. male initiated on antimicrobial therapy with IV Vancomycin for treatment of lower respiratory infection.    Actual Body Weight:   98.5 kg    Renal Function:   Estimated Creatinine Clearance: 121.3 mL/min (based on SCr of 0.8 mg/dL).    Dialysis Method (if applicable):  N/A    Drug Allergies:   Review of patient's allergies indicates:  No Known Allergies    CBC (last 72 hours):  Recent Labs   Lab Result Units 05/26/23 0323 05/27/23 0059 05/28/23  0045   WBC K/uL 14.19* 9.74 9.22   Hemoglobin g/dL 10.8* 8.7* 9.5*   Hematocrit % 33.6* 27.8* 29.4*   Platelets K/uL 197 182 213   Gran % % 80.0* 83.0* 77.5*   Lymph % % 13.3* 10.7* 14.8*   Mono % % 4.8 4.9 5.7   Eosinophil % % 1.3 0.9 1.5   Basophil % % 0.2 0.1 0.1   Differential Method  Automated Automated Automated       Metabolic Panel (last 72 hours):  Recent Labs   Lab Result Units 05/25/23 2115 05/26/23 0323 05/27/23 0059 05/28/23  0045   Sodium mmol/L  --  142 142 143   Potassium mmol/L  --  3.9 3.3* 3.7   Chloride mmol/L  --  106 107 110   CO2 mmol/L  --  24 27 24   Glucose mg/dL  --  87 132* 106   Glucose, UA  Negative  --   --   --    BUN mg/dL  --  9 8 9    Creatinine mg/dL  --  1.0 0.9 0.8   Albumin g/dL  --  2.7* 2.4* 2.6*   Total Bilirubin mg/dL  --  0.7 1.3* 1.2*   Alkaline Phosphatase U/L  --  61 57 75   AST U/L  --  100* 84* 85*   ALT U/L  --  57* 47* 58*   Magnesium mg/dL  --  1.4* 1.6 1.5*   Phosphorus mg/dL  --  4.7* 2.1* 2.7       Vancomycin Administrations:  vancomycin given in the last 96 hours                     vancomycin (VANCOCIN) 1,000 mg in dextrose 5 % (D5W) 250 mL IVPB (Vial-Mate) (mg) 1,000 mg New Bag 05/28/23 1105     1,000 mg New Bag  0049     1,000 mg New Bag 05/27/23 1102     1,000 mg New Bag  0132    vancomycin 1,500 mg in dextrose 5 % (D5W) 250 mL IVPB (Vial-Mate) (mg) 1,500 mg New Bag 05/26/23 1208                    Microbiologic Results:  Microbiology Results (last 7 days)       Procedure Component Value Units Date/Time    Blood culture [[id number]] Collected: 05/25/23 2203    Order Status: Completed Specimen: Blood from Peripheral, Antecubital, Right Updated: 05/27/23 2312     Blood Culture, Routine No Growth to date      No Growth to date      No Growth to date    Narrative:      Site #2 - Aerobic and Anaerobic    Culture, Respiratory with Gram Stain [[id number]]  (Abnormal) Collected: 05/26/23 0024    Order Status: Completed Specimen: Respiratory from Tracheal Aspirate Updated: 05/27/23 0749     Respiratory Culture GRAM NEGATIVE EMERY  Many  Identification and susceptibility pending  Normal respiratory patrick also present       Gram Stain (Respiratory) <10 epithelial cells per low power field.     Gram Stain (Respiratory) Many WBC's     Gram Stain (Respiratory) Many Gram positive cocci     Gram Stain (Respiratory) Moderate Gram negative rods    Urine culture [[id number]] Collected: 05/24/23 1807    Order Status: Completed Specimen: Urine Updated: 05/26/23 1527     Urine Culture, Routine No growth    Narrative:      Specimen Source->Urine    Blood culture [[id number]] Collected: 05/25/23 2203    Order Status: Sent Specimen: Blood from  Peripheral, Antecubital, Right Updated: 05/25/23 2207

## 2023-05-28 NOTE — RESPIRATORY THERAPY
Pt was extubated at 1129 to RA and has no stridor. Pt was sx'd before and after extubation will continue to monitor rest of shift for any complications

## 2023-05-29 PROBLEM — Z99.11 ON MECHANICALLY ASSISTED VENTILATION: Status: RESOLVED | Noted: 2023-05-24 | Resolved: 2023-05-29

## 2023-05-29 PROBLEM — R74.01 TRANSAMINITIS: Status: ACTIVE | Noted: 2023-05-23

## 2023-05-29 LAB
ALBUMIN SERPL BCP-MCNC: 3.2 G/DL (ref 3.5–5.2)
ALP SERPL-CCNC: 82 U/L (ref 55–135)
ALT SERPL W/O P-5'-P-CCNC: 85 U/L (ref 10–44)
ANION GAP SERPL CALC-SCNC: 13 MMOL/L (ref 8–16)
AST SERPL-CCNC: 102 U/L (ref 10–40)
BACTERIA SPEC AEROBE CULT: ABNORMAL
BACTERIA SPEC AEROBE CULT: ABNORMAL
BASOPHILS # BLD AUTO: 0.02 K/UL (ref 0–0.2)
BASOPHILS NFR BLD: 0.2 % (ref 0–1.9)
BILIRUB SERPL-MCNC: 0.5 MG/DL (ref 0.1–1)
BUN SERPL-MCNC: 10 MG/DL (ref 6–20)
CALCIUM SERPL-MCNC: 9.6 MG/DL (ref 8.7–10.5)
CHLORIDE SERPL-SCNC: 110 MMOL/L (ref 95–110)
CO2 SERPL-SCNC: 20 MMOL/L (ref 23–29)
CREAT SERPL-MCNC: 1 MG/DL (ref 0.5–1.4)
DIFFERENTIAL METHOD: ABNORMAL
EOSINOPHIL # BLD AUTO: 0.2 K/UL (ref 0–0.5)
EOSINOPHIL NFR BLD: 1.6 % (ref 0–8)
ERYTHROCYTE [DISTWIDTH] IN BLOOD BY AUTOMATED COUNT: 12.4 % (ref 11.5–14.5)
EST. GFR  (NO RACE VARIABLE): >60 ML/MIN/1.73 M^2
GLUCOSE SERPL-MCNC: 92 MG/DL (ref 70–110)
GRAM STN SPEC: ABNORMAL
HCT VFR BLD AUTO: 34.1 % (ref 40–54)
HGB BLD-MCNC: 11 G/DL (ref 14–18)
IMM GRANULOCYTES # BLD AUTO: 0.05 K/UL (ref 0–0.04)
IMM GRANULOCYTES NFR BLD AUTO: 0.5 % (ref 0–0.5)
LYMPHOCYTES # BLD AUTO: 2.1 K/UL (ref 1–4.8)
LYMPHOCYTES NFR BLD: 19.8 % (ref 18–48)
MAGNESIUM SERPL-MCNC: 1.6 MG/DL (ref 1.6–2.6)
MCH RBC QN AUTO: 28.6 PG (ref 27–31)
MCHC RBC AUTO-ENTMCNC: 32.3 G/DL (ref 32–36)
MCV RBC AUTO: 89 FL (ref 82–98)
MONOCYTES # BLD AUTO: 0.8 K/UL (ref 0.3–1)
MONOCYTES NFR BLD: 7.3 % (ref 4–15)
NEUTROPHILS # BLD AUTO: 7.4 K/UL (ref 1.8–7.7)
NEUTROPHILS NFR BLD: 70.6 % (ref 38–73)
NRBC BLD-RTO: 0 /100 WBC
PHOSPHATE SERPL-MCNC: 2.7 MG/DL (ref 2.7–4.5)
PLATELET # BLD AUTO: 264 K/UL (ref 150–450)
PMV BLD AUTO: 9.9 FL (ref 9.2–12.9)
POTASSIUM SERPL-SCNC: 4.2 MMOL/L (ref 3.5–5.1)
PROT SERPL-MCNC: 6.3 G/DL (ref 6–8.4)
RBC # BLD AUTO: 3.85 M/UL (ref 4.6–6.2)
SODIUM SERPL-SCNC: 143 MMOL/L (ref 136–145)
WBC # BLD AUTO: 10.5 K/UL (ref 3.9–12.7)

## 2023-05-29 PROCEDURE — 25000003 PHARM REV CODE 250: Performed by: NURSE PRACTITIONER

## 2023-05-29 PROCEDURE — 97166 OT EVAL MOD COMPLEX 45 MIN: CPT

## 2023-05-29 PROCEDURE — 97162 PT EVAL MOD COMPLEX 30 MIN: CPT

## 2023-05-29 PROCEDURE — 83735 ASSAY OF MAGNESIUM: CPT | Performed by: NURSE PRACTITIONER

## 2023-05-29 PROCEDURE — 80053 COMPREHEN METABOLIC PANEL: CPT | Performed by: NURSE PRACTITIONER

## 2023-05-29 PROCEDURE — 92610 EVALUATE SWALLOWING FUNCTION: CPT

## 2023-05-29 PROCEDURE — 99233 SBSQ HOSP IP/OBS HIGH 50: CPT | Mod: ,,, | Performed by: PSYCHIATRY & NEUROLOGY

## 2023-05-29 PROCEDURE — 97530 THERAPEUTIC ACTIVITIES: CPT

## 2023-05-29 PROCEDURE — 85025 COMPLETE CBC W/AUTO DIFF WBC: CPT | Performed by: NURSE PRACTITIONER

## 2023-05-29 PROCEDURE — 25000003 PHARM REV CODE 250: Performed by: STUDENT IN AN ORGANIZED HEALTH CARE EDUCATION/TRAINING PROGRAM

## 2023-05-29 PROCEDURE — C9254 INJECTION, LACOSAMIDE: HCPCS | Performed by: NURSE PRACTITIONER

## 2023-05-29 PROCEDURE — 63600175 PHARM REV CODE 636 W HCPCS: Performed by: NURSE PRACTITIONER

## 2023-05-29 PROCEDURE — 99233 PR SUBSEQUENT HOSPITAL CARE,LEVL III: ICD-10-PCS | Mod: ,,, | Performed by: PSYCHIATRY & NEUROLOGY

## 2023-05-29 PROCEDURE — 84100 ASSAY OF PHOSPHORUS: CPT | Performed by: NURSE PRACTITIONER

## 2023-05-29 PROCEDURE — 11000001 HC ACUTE MED/SURG PRIVATE ROOM

## 2023-05-29 RX ORDER — POLYETHYLENE GLYCOL 3350 17 G/17G
17 POWDER, FOR SOLUTION ORAL DAILY
Status: DISCONTINUED | OUTPATIENT
Start: 2023-05-29 | End: 2023-05-31 | Stop reason: HOSPADM

## 2023-05-29 RX ORDER — LEVETIRACETAM 500 MG/1
1000 TABLET ORAL 2 TIMES DAILY
Status: DISCONTINUED | OUTPATIENT
Start: 2023-05-29 | End: 2023-05-31 | Stop reason: HOSPADM

## 2023-05-29 RX ORDER — ONDANSETRON 4 MG/1
4 TABLET, ORALLY DISINTEGRATING ORAL EVERY 8 HOURS PRN
Status: DISCONTINUED | OUTPATIENT
Start: 2023-05-29 | End: 2023-05-31 | Stop reason: HOSPADM

## 2023-05-29 RX ORDER — LACOSAMIDE 100 MG/1
200 TABLET ORAL EVERY 12 HOURS
Status: DISCONTINUED | OUTPATIENT
Start: 2023-05-29 | End: 2023-05-31 | Stop reason: HOSPADM

## 2023-05-29 RX ORDER — TALC
6 POWDER (GRAM) TOPICAL NIGHTLY PRN
Status: DISCONTINUED | OUTPATIENT
Start: 2023-05-29 | End: 2023-05-31 | Stop reason: HOSPADM

## 2023-05-29 RX ORDER — SENNOSIDES 8.6 MG/1
8.6 TABLET ORAL DAILY PRN
Status: DISCONTINUED | OUTPATIENT
Start: 2023-05-29 | End: 2023-05-31 | Stop reason: HOSPADM

## 2023-05-29 RX ADMIN — CLOBAZAM 20 MG: 2.5 SUSPENSION ORAL at 09:05

## 2023-05-29 RX ADMIN — LEVETIRACETAM 1000 MG: 500 TABLET, FILM COATED ORAL at 11:05

## 2023-05-29 RX ADMIN — HEPARIN SODIUM 5000 UNITS: 5000 INJECTION INTRAVENOUS; SUBCUTANEOUS at 11:05

## 2023-05-29 RX ADMIN — SULFAMETHOXAZOLE AND TRIMETHOPRIM 2 TABLET: 800; 160 TABLET ORAL at 10:05

## 2023-05-29 RX ADMIN — SULFAMETHOXAZOLE AND TRIMETHOPRIM 2 TABLET: 800; 160 TABLET ORAL at 11:05

## 2023-05-29 RX ADMIN — LACOSAMIDE 200 MG: 10 INJECTION INTRAVENOUS at 04:05

## 2023-05-29 RX ADMIN — LEVETIRACETAM 2000 MG: 100 INJECTION, SOLUTION INTRAVENOUS at 08:05

## 2023-05-29 RX ADMIN — CLOBAZAM 20 MG: 2.5 SUSPENSION ORAL at 11:05

## 2023-05-29 RX ADMIN — AMLODIPINE BESYLATE 10 MG: 10 TABLET ORAL at 08:05

## 2023-05-29 RX ADMIN — LACOSAMIDE 200 MG: 100 TABLET, FILM COATED ORAL at 05:05

## 2023-05-29 RX ADMIN — HEPARIN SODIUM 5000 UNITS: 5000 INJECTION INTRAVENOUS; SUBCUTANEOUS at 01:05

## 2023-05-29 NOTE — PROGRESS NOTES
Jarett Preciado - Neurosurgery ([facility])  Neurocritical Care  Progress Note    Admit Date: 5/24/2023  Service Date: 05/29/2023  Length of Stay: 5    Subjective:     Chief Complaint: Status epilepticus    History of Present Illness:  39 yo M w/ PMHx of SDH/TBI s/p craniectomy 12/2020, Bipolar disorder, HTN, and known history of seizure presented to [facility] ED on 5/22/23 without identifiable information after witnessed seizure. Was naked on presentation and incontinent of urine. Pt could not be identifed on presentation, was brought in by friend who also did not provide any information. Was given 10 mg Versed IM via EMS. Was obtunded and minimally responsive to painful stimuli.Pt intubated and sedated 5/23/23 in preparation for transfer to Neuro ICU. Had breakthrough seizure overnight despite sedation on Propofol and Versed, requiring Ativan push. EEG cap restarted this morning, discussed with Epileptologist at [facility] [place]. Spot EEG unable to be obtained as technician has limited availability at [facility]. Admitted to [facility] for continuous EEG and neuro monitoring.       Hospital Course: 05/27/2023: Failed SBT due to apnea. Fentanyl gtt discontinued, continue Precedex. EEG negative for seizures.   05/28/2023: Tolerated SBT, extubated to room air. Tolerating well. EEG discontinued. IVF decreased.  05/29/2023 NAEON. Keppra decreased to 1000mg BID. ALONDRA resolved. Stable for step down to HM.      Review of Systems:   Review of Systems   Eyes:  Negative for blurred vision, double vision and photophobia.   Gastrointestinal:  Negative for heartburn, nausea and vomiting.   Musculoskeletal:  Negative for back pain and neck pain.   Neurological:  Positive for seizures. Negative for dizziness, tingling, focal weakness, loss of consciousness, weakness and headaches.     Vitals:   Temp: 98.1 °F (36.7 °C)  Pulse: 77  Rhythm: normal sinus rhythm  BP: (!) 162/82  MAP (mmHg): 115  Resp: 17  SpO2: 98 %    Temp  Min: 98 °F (36.7 °C)  Max:  98.5 °F (36.9 °C)  Pulse  Min: 71  Max: 96  BP  Min: 142/70  Max: 179/86  MAP (mmHg)  Min: 98  Max: 132  Resp  Min: 16  Max: 63  SpO2  Min: 94 %  Max: 100 %    05/28 0701 - 05/29 0700  In: 2467.2 [I.V.:1729.5]  Out: 2325 [Urine:1975]   Unmeasured Output  Urine Occurrence: 2  Stool Occurrence: 1  Pad Count: 2     Examination:   Constitutional: Well-nourished and -developed. No apparent distress.   Eyes: Conjunctiva clear, anicteric. Lids no lesions.  Head/Ears/Nose/Mouth/Throat/Neck: Moist mucous membranes. External ears, nose atraumatic.   Cardiovascular: Regular rhythm. No leg edema.  Respiratory: Comfortable respirations. Clear to auscultation.  Gastrointestinal: Soft, nondistended, nontender. + bowel sounds.    Neurologic:  -GCS E 4 V 5 M 6  -Alert. Oriented to self, place and time. Follows commands.  -Cranial nerves: EOM intact, PERRL, no facial droop.  -Motor: Moves all extremities spontaneously, antigravity, R > L  -Sensation: Intact to light touch    Medications:   Continuous   ScheduledamLODIPine, 10 mg, Daily  cloBAZam, 20 mg, BID  heparin (porcine), 5,000 Units, Q8H  lacosamide, 200 mg, Q12H  levETIRAcetam, 1,000 mg, BID  polyethylene glycol, 17 g, Daily  sulfamethoxazole-trimethoprim 800-160mg, 2 tablet, BID    PRNacetaminophen, 650 mg, Q6H PRN  melatonin, 6 mg, Nightly PRN  ondansetron, 4 mg, Q8H PRN  senna, 8.6 mg, Daily PRN  sodium chloride 0.9%, 10 mL, PRN       Today I independently reviewed pertinent medications, lines/drains/airways, imaging, cardiology results, laboratory results, microbiology results, notably:     ISTAT:   No results for input(s): PH, PCO2, PO2, POCSATURATED, HCO3, BE, POCNA, POCK, POCTCO2, POCGLU, POCICA, POCLAC, SAMPLE in the last 24 hours.     Chem:   Recent Labs   Lab 05/29/23  0145      K 4.2      CO2 20*   GLU 92   BUN 10   CREATININE 1.0   CALCIUM 9.6   MG 1.6   PHOS 2.7   ANIONGAP 13   PROT 6.3   ALBUMIN 3.2*   BILITOT 0.5   ALKPHOS 82   *   ALT 85*        Heme:   Recent Labs   Lab 05/29/23  0145   WBC 10.50   HGB 11.0*   HCT 34.1*          Endo: No results for input(s): POCTGLUCOSE in the last 24 hours.         Assessment/Plan:     Neuro  * Status epilepticus  - admitted to [facility] to r/o status  - Off anesthetics and extubated 5/28  - continue Vimpat 200 mg BID  - continue Onfi 20 mg daily  - EEG continues to be negative, discontinued 5/28    Stable for step down 5/29    Seizure  History of seizures  See Status epilepticus    Pulmonary  On mechanically assisted ventilation-resolved as of 5/29/2023  CXR daily  - ABG daily  - Tolerated SBT, extubated to room air 5/28    Cardiac/Vascular  HTN (hypertension)  SBP goal < 180    Renal/  ALONDRA (acute kidney injury)  Resolved  Estimated Creatinine Clearance: 97 mL/min (based on SCr of 1 mg/dL).      Orthopedic  Non-traumatic rhabdomyolysis  CK trending down  Daily CK  Encourage PO intake          The patient is being Prophylaxed for:  Venous Thromboembolism with: Chemical  Stress Ulcer with: Not Applicable   Ventilator Pneumonia with: not applicable    Activity Orders          Diet Adult Regular (IDDSI Level 7) Thin: Regular starting at 05/28 1432    Turn patient starting at 05/24 1600    Elevate HOB starting at 05/24 1519        Full Code    Rodger Friedman MD  Neurocritical Care  [facility] - Neurosurgery ([facility])

## 2023-05-29 NOTE — PLAN OF CARE
Jarett Preciado - Neurosurgery (Hospital)  Discharge Reassessment    Primary Care Provider: Primary Doctor No    Expected Discharge Date: 5/31/2023    Patient stepped down to NPU today.  SW sent rehab referrals    Reassessment (most recent)       Discharge Reassessment - 05/29/23 1520          Discharge Reassessment    Assessment Type Discharge Planning Reassessment     Did the patient's condition or plan change since previous assessment? No     Communicated USHA with patient/caregiver Yes     Discharge Plan A Rehab     Discharge Plan B Home     DME Needed Upon Discharge  none     Transition of Care Barriers Underinsured     Why the patient remains in the hospital Requires continued medical care                   Shima Kruse RN, CCRN-K, [facility]  Neuro-Critical Care   X [phone]

## 2023-05-29 NOTE — PT/OT/SLP EVAL
"Speech Language Pathology Evaluation  Bedside Swallow  Discharge    Patient Name:  Yeison Andrade   MRN:  [id number]  Admitting Diagnosis: Status epilepticus    Recommendations:                 General Recommendations:  Follow-up not indicated  Diet recommendations:  Regular, Thin   Aspiration Precautions: Standard aspiration precautions   General Precautions: Standard, aspiration, fall, NPO  Communication strategies:  none    Assessment:     Yeison Andrade is a 38 y.o. male with oropharyngeal swallow deemed WFL.    History:     Past Medical History:   Diagnosis Date    Hypertension     Seizures        No past surgical history on file.      Prior Intubation HX:  5/23 5704-7442    Chest X-Rays: 5/28 Stable positioning of endotracheal tube, right-sided PICC, and enteric tube which courses inferiorly out of the field of view.     Stable cardiomediastinal silhouette.  No significant detrimental change in cardiopulmonary status.  Continued patchy opacities at the right lung base.  No new focal consolidation.  No pneumothorax.  No significant pleural fluid.       Prior diet: regular/thin.    Subjective     Awake/alert  "I can swallow fine."     Pain/Comfort:  Pain Rating 1: 0/10  Pain Rating Post-Intervention 1: 0/10    Respiratory Status: Room air    Objective:     Oral Musculature Evaluation  Oral Musculature: WFL  Dentition: present and adequate  Oral Labial Strength and Mobility: WFL  Lingual Strength and Mobility: WFL  Voice Prior to PO Intake: clear    Bedside Swallow Eval:   Consistencies Assessed:  Thin liquids x7  Solids x4      Oral Phase:   WFL    Pharyngeal Phase:   no overt clinical signs/symptoms of aspiration      Goals:   Multidisciplinary Problems       SLP Goals       Not on file                    Plan:     Plan of Care reviewed with:  patient   SLP Follow-Up:  No       Discharge recommendations:    no St f/u     Time Tracking:     SLP Treatment Date:   05/29/23  Speech Start Time:  0826  Speech Stop Time:  " 0834     Speech Total Time (min):  8 min    Billable Minutes: Eval Swallow and Oral Function 8    05/29/2023

## 2023-05-29 NOTE — ASSESSMENT & PLAN NOTE
- admitted to [facility] to r/o status  - Off anesthetics and extubated 5/28  - continue Vimpat 200 mg BID  - continue Onfi 20 mg daily  - EEG continues to be negative, discontinued 5/28    Stable for step down 5/29

## 2023-05-29 NOTE — PLAN OF CARE
Goals remain appropriate.  Problem: Occupational Therapy  Goal: Occupational Therapy Goal  Description: Goals set 5/29 to be addressed for 14 days with expiration date, 6/12:  Patient will increase functional independence with ADLs by performing:    Patient will demonstrate rolling to the right with modified independence.  Not met   Patient will demonstrate rolling to the left with modified independence.   Not met  Patient will demonstrate supine -sit with modified independence.   Not met  Patient will demonstrate stand pivot transfers with modified independence.   Not met  Patient will demonstrate grooming while standing with modified independence.   Not met  Patient will demonstrate upper body dressing with modified independence while seated EOB.   Not met  Patient will demonstrate lower body dressing with modified independence while seated EOB.   Not met  Patient will demonstrate toileting with modified independence.   Not met  Patient will demonstrate bathing while seated EOB with modified independence.   Not met  Patient's family / caregiver will demonstrate independence and safety with assisting patient with self-care skills and functional mobility.     Not met        Outcome: Ongoing, Progressing

## 2023-05-29 NOTE — SUBJECTIVE & OBJECTIVE
Review of Systems:   Review of Systems   Eyes:  Negative for blurred vision, double vision and photophobia.   Gastrointestinal:  Negative for heartburn, nausea and vomiting.   Musculoskeletal:  Negative for back pain and neck pain.   Neurological:  Positive for seizures. Negative for dizziness, tingling, focal weakness, loss of consciousness, weakness and headaches.     Vitals:   Temp: 98.1 °F (36.7 °C)  Pulse: 77  Rhythm: normal sinus rhythm  BP: (!) 162/82  MAP (mmHg): 115  Resp: 17  SpO2: 98 %    Temp  Min: 98 °F (36.7 °C)  Max: 98.5 °F (36.9 °C)  Pulse  Min: 71  Max: 96  BP  Min: 142/70  Max: 179/86  MAP (mmHg)  Min: 98  Max: 132  Resp  Min: 16  Max: 63  SpO2  Min: 94 %  Max: 100 %    05/28 0701 - 05/29 0700  In: 2467.2 [I.V.:1729.5]  Out: 2325 [Urine:1975]   Unmeasured Output  Urine Occurrence: 2  Stool Occurrence: 1  Pad Count: 2     Examination:   Constitutional: Well-nourished and -developed. No apparent distress.   Eyes: Conjunctiva clear, anicteric. Lids no lesions.  Head/Ears/Nose/Mouth/Throat/Neck: Moist mucous membranes. External ears, nose atraumatic.   Cardiovascular: Regular rhythm. No leg edema.  Respiratory: Comfortable respirations. Clear to auscultation.  Gastrointestinal: Soft, nondistended, nontender. + bowel sounds.    Neurologic:  -GCS E 4 V 5 M 6  -Alert. Oriented to self, place and time. Follows commands.  -Cranial nerves: EOM intact, PERRL, no facial droop.  -Motor: Moves all extremities spontaneously, antigravity, R > L  -Sensation: Intact to light touch    Medications:   Continuous   ScheduledamLODIPine, 10 mg, Daily  cloBAZam, 20 mg, BID  heparin (porcine), 5,000 Units, Q8H  lacosamide, 200 mg, Q12H  levETIRAcetam, 1,000 mg, BID  polyethylene glycol, 17 g, Daily  sulfamethoxazole-trimethoprim 800-160mg, 2 tablet, BID    PRNacetaminophen, 650 mg, Q6H PRN  melatonin, 6 mg, Nightly PRN  ondansetron, 4 mg, Q8H PRN  senna, 8.6 mg, Daily PRN  sodium chloride 0.9%, 10 mL, PRN       Today I  independently reviewed pertinent medications, lines/drains/airways, imaging, cardiology results, laboratory results, microbiology results, notably:     ISTAT:   No results for input(s): PH, PCO2, PO2, POCSATURATED, HCO3, BE, POCNA, POCK, POCTCO2, POCGLU, POCICA, POCLAC, SAMPLE in the last 24 hours.     Chem:   Recent Labs   Lab 05/29/23  0145      K 4.2      CO2 20*   GLU 92   BUN 10   CREATININE 1.0   CALCIUM 9.6   MG 1.6   PHOS 2.7   ANIONGAP 13   PROT 6.3   ALBUMIN 3.2*   BILITOT 0.5   ALKPHOS 82   *   ALT 85*       Heme:   Recent Labs   Lab 05/29/23 0145   WBC 10.50   HGB 11.0*   HCT 34.1*          Endo: No results for input(s): POCTGLUCOSE in the last 24 hours.

## 2023-05-29 NOTE — NURSING
Nursing Transfer Note       Transfer From 9075 to 940    Transfer via wheelchair    Transfer with cardiac monitoring    Transported by CORWIN Moulton     Medicines sent:  N/A    Chart sent with patient: Yes    Belongings sent with patient: No belongings to send     Notified: MotherSachi    Bedside Neuro assessment performed: Yes    Bedside Handoff given to: CORWIN Goins     Upon arrival to floor: cardiac monitor applied, patient oriented to room, call bell in reach, and bed in lowest position

## 2023-05-29 NOTE — PLAN OF CARE
05/29/23 1411   Post-Acute Status   Post-Acute Authorization Placement   Post-Acute Placement Status Referrals Sent  (rehab)     Met with Pt to review discharge recommendation of rehab and is agreeable to possible plan. Discussed rehab criteria as being able to tolerate three hours of therapy and need for a MD to see Pt three times a week. If this changes, plan B would be a SNF facility or OP therapy    Provided list of Rehab facilities in-network with patient's payor plan. Notified that blast referral sent to below listed facilities from list based on proximity to home/family support:   [facility]  [facility]      Pt was instructed to identify preference.    If an additional preferred facility not listed above is identified, additional referral to be sent. If above facilities unable to accept, will send additional referrals to in-network providers.     Terri Gomez LCSW  Neurocritical Care   [facility]  [id number]

## 2023-05-29 NOTE — HPI
Yeison Andrade is a 37 yo M w/ PMHx of SDH/TBI s/p craniectomy 12/2020, Bipolar disorder, HTN, and known history of seizure presented to [facility] ED on 5/22/23 without identifiable information after witnessed seizure. Was naked on presentation and incontinent of urine. Pt could not be identifed on presentation, was brought in by friend who also did not provide any information. Was given 10 mg Versed IM via EMS. Was obtunded and minimally responsive to painful stimuli.Pt intubated and sedated 5/23/23 in preparation for transfer to Neuro ICU. Had breakthrough seizure overnight despite sedation on Propofol and Versed, requiring Ativan push. EEG cap restarted this morning, discussed with Epileptologist at [facility] [place]. Spot EEG unable to be obtained as technician has limited availability at [facility]. Admitted to [facility] for continuous EEG and neuro monitoring.

## 2023-05-29 NOTE — PLAN OF CARE
PT Eval complete, appropriate goals created    Problem: Physical Therapy  Goal: Physical Therapy Goal  Description: Goals to be completed by: 6/29/23    Pt will perform sup<>sit transfers w/ independence  Pt will have sufficient dynamic balance to sit EOB while performing ADLs/therex w/ independence  PT will be able to stand up from EOB w/ independence using no AD  Pt will ambulate 200 feet w/ independence using LRAD  Pt will be independent w/ HEP therex on BLE w/ good form and ROM   Outcome: Ongoing, Progressing      No

## 2023-05-29 NOTE — PLAN OF CARE
[facility] Medicine ICU Acceptance Note    Date of Admit: 5/24/2023  Date of Transfer / Stepdown: 5/29/2023  Bosergei, C/J, H, L, Onc (IV chemo w/in 1 month), Gyn/Onc, or other special case?: No   ICU team stepping patient down: [facility]  ICU team member giving verbal handoff: Rodger Abbott    Accepting  team: N    Brief History of Present Illness:      Yeison Andrade is a 37 yo M w/ PMHx of SDH/TBI s/p craniectomy 12/2020, Bipolar disorder, HTN, and known history of seizure presented to [facility] ED on 5/22/23 without identifiable information after witnessed seizure. Was naked on presentation and incontinent of urine. Pt could not be identifed on presentation, was brought in by friend who also did not provide any information. Was given 10 mg Versed IM via EMS. Was obtunded and minimally responsive to painful stimuli.Pt intubated and sedated 5/23/23 in preparation for transfer to Neuro ICU. Had breakthrough seizure overnight despite sedation on Propofol and Versed, requiring Ativan push. EEG cap restarted this morning, discussed with Epileptologist at [facility] [place]. Spot EEG unable to be obtained as technician has limited availability at [facility]. Admitted to [facility] for continuous EEG and neuro monitoring.     Hospital/ICU Course:     05/27/2023: Failed SBT due to apnea. Fentanyl gtt discontinued, continue Precedex. EEG negative for seizures.   05/28/2023: Tolerated SBT, extubated to room air. Tolerating well. EEG     Consultants and Procedures:     Consultants:  Pulmonology  Neurology  Epilepsy    Procedures:    EEG    Transfer Information:     Diet:  Regular    Physical Activity:  PT/OT: Rehab    To Do / Pending Studies / Follow ups:  - Placement  - Continue AEDs: Vimpat, Keppra and Onfi  - Continue abx      Patient has been accepted by [facility] Medicine Team N, who will assume care of the patient upon arrival to the floor from the ICU. Please contact ICU team with any concerns prior to arrival. Please contact  Hospital Medicine at [phone] or [phone] (please do NOT leave a voicemail) when patient arrives to the floor.    Curtis Joseph MD  Hospital Medicine Staff

## 2023-05-29 NOTE — PT/OT/SLP EVAL
Physical Therapy Evaluation and Treatment    Patient Name: Yeison Andrade   MRN: [id number]  Recent Surgery: * No surgery found *      Recommendations:     Discharge Recommendations: rehabilitation facility   Discharge Equipment Recommendations: to be determined by next level of care   Barriers to discharge: Increased level of assist    Assessment:     Yeison Andrade is a 38 y.o. male admitted with a medical diagnosis of Status epilepticus. He presents with the following impairments/functional limitations: impaired self care skills, impaired functional mobility, impaired balance, gait instability, decreased safety awareness, impaired coordination. Pt w/ good strength and mobility but poor balance w/ multiple LOB requiring modA to recover during gait. Rec d/c to IPR for continued treatment in order to maximize functional return as pt w/ high motivation, good activity tolerance, and w/ potential for significant improvements in functional mobility.      Rehab Prognosis: Good; patient would benefit from acute skilled PT services to address these deficits and reach maximum level of function.  Recent Surgery: * No surgery found *      Plan:     During this hospitalization, patient to be seen 4 x/week to address the identified rehab impairments via gait training, therapeutic activities, therapeutic exercises, neuromuscular re-education and progress toward the following goals:    Plan of Care Expires: 23    Subjective     Chief Complaint: tired  Patient/Family Comments/Goals: pt wants to get out of here so he can go  his car  Pain/Comfort:  Pain Rating 1: 0/10  Pain Rating Post-Intervention 1: 0/10    Patients cultural, spiritual, Alevism conflicts given the current situation: no    Social History:   Living Environment: Patient was living w/ mother in [place] but moved back here a month ago and now lives w/ 2 cousins in their  grandfather's home.   Prior Level of Function: Prior to admission, patient was  independent with ADLs. He was driving and is unemployed.  Equipment Used at Home: none    DME owned (not currently used): none  Assistance Upon Discharge: unknown if family able to assist    Objective:     Communicated with RN prior to session. Patient found HOB elevated with bed alarm, peripheral IV, blood pressure cuff, telemetry upon PT entry to room.    General Precautions: Standard, aspiration, fall, NPO   Orthopedic Precautions:    Braces: N/A  Respiratory Status: Room air    Exams:  Cognitive Exam: Patient is oriented to Person, Place, Time, Situation, follows commands 100% of the time  RLE ROM: WFL  RLE Strength: WFL  LLE ROM: WFL  LLE Strength: WFL  Sensation: Intact light touch to BLEs    Functional Mobility:  Bed Mobility:  Supine to Sit: stand by assistance  Transfers:  Sit to Stand: contact guard assistance with no AD  Bed to Chair: minimum assistance with hand-held assist using Step Transfer  Gait:   Patient ambulated 2x 20 with hand-held assist and moderate assistance. Patient demonstrates occasional unsteady gait, decreased step length, and decreased ashley. All lines remained intact throughout ambulation trial.  Balance:   Static Sitting: stand by assistance at EOB  Dynamic Sitting: contact guard assistance at EOB  Static Standing: contact guard assistance with no AD  Dynamic Standing: minimum assistance with no AD  Tinetti Total Score: 16  < 18 = High Risk of Falls, 19-23 = Moderate Risk of Falls, > 24 = Low Risk of Falls    Therapeutic Activities and Exercises:  Patient educated on role of acute care PT and PT POC, safety while in hospital including calling nurse for mobility, and call light usage  Patient educated about importance of OOB mobility  Doffed soiled gown and donned new one w/ Karel for getting arms through sleeves  Bed to w/c transfer w/ cueing for safety awareness and balance  Gait training w/ cueing for step length, step width, postural control, safety awareness, and obstacle  avoidance     Patient clear to stand pivot transfer with RN/PCT, assist x1 .    AM-PAC 6 CLICK MOBILITY  Turning over in bed (including adjusting bedclothes, sheets and blankets)?: 4  Sitting down on and standing up from a chair with arms (e.g., wheelchair, bedside commode, etc.): 3  Moving from lying on back to sitting on the side of the bed?: 3  Moving to and from a bed to a chair (including a wheelchair)?: 2  Need to walk in hospital room?: 2  Climbing 3-5 steps with a railing?: 2  Basic Mobility Total Score: 16     Patient left  in w/c w/ RN to transfer to NPU    GOALS:   Multidisciplinary Problems       Physical Therapy Goals          Problem: Physical Therapy    Goal Priority Disciplines Outcome Goal Variances Interventions   Physical Therapy Goal     PT, PT/OT Ongoing, Progressing     Description: Goals to be completed by: 6/29/23    Pt will perform sup<>sit transfers w/ independence  Pt will have sufficient dynamic balance to sit EOB while performing ADLs/therex w/ independence  PT will be able to stand up from EOB w/ independence using no AD  Pt will ambulate 200 feet w/ independence using LRAD  Pt will be independent w/ HEP therex on BLE w/ good form and ROM                        History:     Past Medical History:   Diagnosis Date    Hypertension     Seizures        No past surgical history on file.    Time Tracking:     PT Received On: 05/29/23  PT Start Time: 1250  PT Stop Time: 1308  PT Total Time (min): 18 min     Billable Minutes: Evaluation 8 and Therapeutic Activity 10    05/29/2023

## 2023-05-29 NOTE — PT/OT/SLP EVAL
"Occupational Therapy   Evaluation    Name: Yeison Andrade  MRN: [id number]  Admitting Diagnosis: Status epilepticus  Recent Surgery: * No surgery found *      Recommendations:     Discharge Recommendations: rehabilitation facility  Discharge Equipment Recommendations:  shower chair  Barriers to discharge:  None    Assessment:     Yeison Andrade is a 38 y.o. male with a medical diagnosis of Status epilepticus.  He presents with performance deficits affecting function: weakness, impaired endurance, impaired self care skills, impaired functional mobility, gait instability, impaired balance, decreased lower extremity function, decreased upper extremity function, decreased coordination, impaired cognition.      Rehab Prognosis: Good; patient would benefit from acute skilled OT services to address these deficits and reach maximum level of function.       Plan:     Patient to be seen 3 x/week to address the above listed problems via cognitive retraining, neuromuscular re-education, therapeutic exercises, therapeutic activities, self-care/home management  Plan of Care Expires: 06/22/23  Plan of Care Reviewed with: patient    Subjective   Patient:  "I need a new gown."  "I fell and caught a seizure.  I was here visiting my kids and their mother.  I live in [place]."  "I had a TBI a year ago."  "I use to be able to use my left arm."  Nurse reported patient is wobbly when getting up.    Pain/Comfort:  Pain Rating 1: 0/10  Pain Rating Post-Intervention 1: 0/10    Patients cultural, spiritual, Jew conflicts given the current situation: no    Objective:     Communicated with: Nurse prior to session.  Patient found supine with bed alarm, PICC line, peripheral IV, SCD, pulse ox (continuous), blood pressure cuff, telemetry upon OT entry to room.    General Precautions: Standard, aspiration, fall, NPO  Orthopedic Precautions: N/A  Braces: N/A  Respiratory Status: Room air    Occupational Profile:  Per patient (needs to be " confirmed with family):  Patient resides in [place] with his grandmother, brother and sometimes his mom in one story home with no steps to enter.  Patient is right handed.  PTA patient reports being independent with ADLs including driving.  Unemployed.  Hobbies:  baseball, working out.      Occupational Performance:    Bed Mobility:    Patient completed Rolling/Turning to Left with  supervision  Patient completed Rolling/Turning to Right with supervision  Patient completed Supine to Sit with supervision  Patient completed Sit to Supine with supervision    Functional Mobility/Transfers:  Patient completed Sit <> Stand Transfer with contact guard assistance  with  no assistive device   Patient completed Bed <> Chair Transfer using Stand Pivot technique with contact guard assistance with no assistive device    Activities of Daily Living:  Grooming: minimum assistance while standing  Upper Body Dressing: minimum assistance while standing  Lower Body Dressing: moderate assistance      Cognitive/Visual Perceptual:  Cognitive/Psychosocial Skills:     -       Oriented to: Person, Place, and Time   -       Follows Commands/attention:Follows one-step commands  -       Communication: clear/fluent    Physical Exam:  Postural examination/scapula alignment:    -       Rounded shoulders  Skin integrity: Visible skin intact  Upper Extremity Range of Motion:     -       Right Upper Extremity: WNL  -       Left Upper Extremity: WNL  Upper Extremity Strength:    -       Right Upper Extremity: WNL  -       Left Upper Extremity: 3/5    AMPAC 6 Click ADL:  AMPAC Total Score: 17    Treatment & Education:  Patient alert and oriented x 3; able to follow 4/4 one step commands.  Patient attentive and interactive throughout the session.  Patient education provided on fall prevention during ADLs.    Patient left supine with all lines intact, call button in reach, and bed alarm on    GOALS:   Multidisciplinary Problems       Occupational Therapy  Goals          Problem: Occupational Therapy    Goal Priority Disciplines Outcome Interventions   Occupational Therapy Goal     OT, PT/OT Ongoing, Progressing    Description: Goals set 5/29 to be addressed for 14 days with expiration date, 6/12:  Patient will increase functional independence with ADLs by performing:    Patient will demonstrate rolling to the right with modified independence.  Not met   Patient will demonstrate rolling to the left with modified independence.   Not met  Patient will demonstrate supine -sit with modified independence.   Not met  Patient will demonstrate stand pivot transfers with modified independence.   Not met  Patient will demonstrate grooming while standing with modified independence.   Not met  Patient will demonstrate upper body dressing with modified independence while seated EOB.   Not met  Patient will demonstrate lower body dressing with modified independence while seated EOB.   Not met  Patient will demonstrate toileting with modified independence.   Not met  Patient will demonstrate bathing while seated EOB with modified independence.   Not met  Patient's family / caregiver will demonstrate independence and safety with assisting patient with self-care skills and functional mobility.     Not met                             History:     Past Medical History:   Diagnosis Date    Hypertension     Seizures        No past surgical history on file.    Time Tracking:     OT Date of Treatment: 05/29/23  OT Start Time: 0539  OT Stop Time: 0557  OT Total Time (min): 18 min    Billable Minutes:Evaluation 10  Therapeutic Activity 8    5/29/2023

## 2023-05-29 NOTE — HOSPITAL COURSE
05/27/2023: Failed SBT due to apnea. Fentanyl gtt discontinued, continue Precedex. EEG negative for seizures.   05/28/2023: Tolerated SBT, extubated to room air. Tolerating well. EEG discontinued. IVF decreased.  05/29/2023: NAEON. Keppra decreased to 1000mg BID. ALONDRA resolved.  IV fluids discontinued.  Stable for step down to .  05/30/2023: No complaints.  Pending placement to rehab  05/31/2023: Patient discharged to rehab with follow-up.

## 2023-05-30 PROBLEM — J22 LOWER RESP. TRACT INFECTION: Status: ACTIVE | Noted: 2023-05-30

## 2023-05-30 PROBLEM — N39.0 UTI (URINARY TRACT INFECTION): Status: ACTIVE | Noted: 2023-05-30

## 2023-05-30 LAB
ALBUMIN SERPL BCP-MCNC: 3.3 G/DL (ref 3.5–5.2)
ALP SERPL-CCNC: 82 U/L (ref 55–135)
ALT SERPL W/O P-5'-P-CCNC: 132 U/L (ref 10–44)
ANION GAP SERPL CALC-SCNC: 10 MMOL/L (ref 8–16)
AST SERPL-CCNC: 97 U/L (ref 10–40)
BACTERIA BLD CULT: NORMAL
BASOPHILS # BLD AUTO: 0.03 K/UL (ref 0–0.2)
BASOPHILS NFR BLD: 0.3 % (ref 0–1.9)
BILIRUB SERPL-MCNC: 0.4 MG/DL (ref 0.1–1)
BUN SERPL-MCNC: 15 MG/DL (ref 6–20)
CALCIUM SERPL-MCNC: 10 MG/DL (ref 8.7–10.5)
CHLORIDE SERPL-SCNC: 109 MMOL/L (ref 95–110)
CK SERPL-CCNC: 1365 U/L (ref 20–200)
CO2 SERPL-SCNC: 23 MMOL/L (ref 23–29)
CREAT SERPL-MCNC: 1.2 MG/DL (ref 0.5–1.4)
DIFFERENTIAL METHOD: ABNORMAL
EOSINOPHIL # BLD AUTO: 0.3 K/UL (ref 0–0.5)
EOSINOPHIL NFR BLD: 3 % (ref 0–8)
ERYTHROCYTE [DISTWIDTH] IN BLOOD BY AUTOMATED COUNT: 12.4 % (ref 11.5–14.5)
EST. GFR  (NO RACE VARIABLE): >60 ML/MIN/1.73 M^2
GLUCOSE SERPL-MCNC: 105 MG/DL (ref 70–110)
HCT VFR BLD AUTO: 33.8 % (ref 40–54)
HGB BLD-MCNC: 10.9 G/DL (ref 14–18)
IMM GRANULOCYTES # BLD AUTO: 0.09 K/UL (ref 0–0.04)
IMM GRANULOCYTES NFR BLD AUTO: 1 % (ref 0–0.5)
LYMPHOCYTES # BLD AUTO: 2.3 K/UL (ref 1–4.8)
LYMPHOCYTES NFR BLD: 25.8 % (ref 18–48)
MAGNESIUM SERPL-MCNC: 1.6 MG/DL (ref 1.6–2.6)
MCH RBC QN AUTO: 28.5 PG (ref 27–31)
MCHC RBC AUTO-ENTMCNC: 32.2 G/DL (ref 32–36)
MCV RBC AUTO: 88 FL (ref 82–98)
MONOCYTES # BLD AUTO: 0.8 K/UL (ref 0.3–1)
MONOCYTES NFR BLD: 9.3 % (ref 4–15)
NEUTROPHILS # BLD AUTO: 5.5 K/UL (ref 1.8–7.7)
NEUTROPHILS NFR BLD: 60.6 % (ref 38–73)
NRBC BLD-RTO: 0 /100 WBC
PHOSPHATE SERPL-MCNC: 3.3 MG/DL (ref 2.7–4.5)
PLATELET # BLD AUTO: 296 K/UL (ref 150–450)
PMV BLD AUTO: 10 FL (ref 9.2–12.9)
POTASSIUM SERPL-SCNC: 4 MMOL/L (ref 3.5–5.1)
PROT SERPL-MCNC: 6.8 G/DL (ref 6–8.4)
RBC # BLD AUTO: 3.83 M/UL (ref 4.6–6.2)
SODIUM SERPL-SCNC: 142 MMOL/L (ref 136–145)
WBC # BLD AUTO: 9.03 K/UL (ref 3.9–12.7)

## 2023-05-30 PROCEDURE — 36415 COLL VENOUS BLD VENIPUNCTURE: CPT | Performed by: STUDENT IN AN ORGANIZED HEALTH CARE EDUCATION/TRAINING PROGRAM

## 2023-05-30 PROCEDURE — 25000003 PHARM REV CODE 250: Performed by: NURSE PRACTITIONER

## 2023-05-30 PROCEDURE — 97112 NEUROMUSCULAR REEDUCATION: CPT

## 2023-05-30 PROCEDURE — 80053 COMPREHEN METABOLIC PANEL: CPT | Performed by: STUDENT IN AN ORGANIZED HEALTH CARE EDUCATION/TRAINING PROGRAM

## 2023-05-30 PROCEDURE — 99222 1ST HOSP IP/OBS MODERATE 55: CPT | Mod: ,,, | Performed by: NURSE PRACTITIONER

## 2023-05-30 PROCEDURE — 99222 PR INITIAL HOSPITAL CARE,LEVL II: ICD-10-PCS | Mod: ,,, | Performed by: NURSE PRACTITIONER

## 2023-05-30 PROCEDURE — 99232 SBSQ HOSP IP/OBS MODERATE 35: CPT | Mod: ,,, | Performed by: STUDENT IN AN ORGANIZED HEALTH CARE EDUCATION/TRAINING PROGRAM

## 2023-05-30 PROCEDURE — 11000001 HC ACUTE MED/SURG PRIVATE ROOM

## 2023-05-30 PROCEDURE — 82550 ASSAY OF CK (CPK): CPT | Performed by: STUDENT IN AN ORGANIZED HEALTH CARE EDUCATION/TRAINING PROGRAM

## 2023-05-30 PROCEDURE — 25000003 PHARM REV CODE 250: Performed by: STUDENT IN AN ORGANIZED HEALTH CARE EDUCATION/TRAINING PROGRAM

## 2023-05-30 PROCEDURE — 85025 COMPLETE CBC W/AUTO DIFF WBC: CPT | Performed by: STUDENT IN AN ORGANIZED HEALTH CARE EDUCATION/TRAINING PROGRAM

## 2023-05-30 PROCEDURE — 84100 ASSAY OF PHOSPHORUS: CPT | Performed by: STUDENT IN AN ORGANIZED HEALTH CARE EDUCATION/TRAINING PROGRAM

## 2023-05-30 PROCEDURE — 63600175 PHARM REV CODE 636 W HCPCS: Performed by: NURSE PRACTITIONER

## 2023-05-30 PROCEDURE — 97535 SELF CARE MNGMENT TRAINING: CPT

## 2023-05-30 PROCEDURE — 99232 PR SUBSEQUENT HOSPITAL CARE,LEVL II: ICD-10-PCS | Mod: ,,, | Performed by: STUDENT IN AN ORGANIZED HEALTH CARE EDUCATION/TRAINING PROGRAM

## 2023-05-30 PROCEDURE — 83735 ASSAY OF MAGNESIUM: CPT | Performed by: STUDENT IN AN ORGANIZED HEALTH CARE EDUCATION/TRAINING PROGRAM

## 2023-05-30 RX ORDER — CEFPODOXIME PROXETIL 200 MG/1
200 TABLET, FILM COATED ORAL EVERY 12 HOURS
Status: DISCONTINUED | OUTPATIENT
Start: 2023-05-30 | End: 2023-05-30

## 2023-05-30 RX ORDER — POLYETHYLENE GLYCOL 3350 17 G/17G
17 POWDER, FOR SOLUTION ORAL 2 TIMES DAILY PRN
Refills: 0
Start: 2023-05-30

## 2023-05-30 RX ORDER — CLOBAZAM 2.5 MG/ML
20 SUSPENSION ORAL 2 TIMES DAILY
Start: 2023-05-30 | End: 2023-05-31 | Stop reason: HOSPADM

## 2023-05-30 RX ORDER — CEFPODOXIME PROXETIL 200 MG/1
200 TABLET, FILM COATED ORAL EVERY 12 HOURS
Status: DISCONTINUED | OUTPATIENT
Start: 2023-05-30 | End: 2023-05-31 | Stop reason: HOSPADM

## 2023-05-30 RX ORDER — LEVETIRACETAM 1000 MG/1
1000 TABLET ORAL 2 TIMES DAILY
Qty: 60 TABLET | Refills: 11
Start: 2023-05-30 | End: 2024-05-29

## 2023-05-30 RX ADMIN — LEVETIRACETAM 1000 MG: 500 TABLET, FILM COATED ORAL at 09:05

## 2023-05-30 RX ADMIN — CEFPODOXIME PROXETIL 200 MG: 200 TABLET, FILM COATED ORAL at 09:05

## 2023-05-30 RX ADMIN — CLOBAZAM 20 MG: 2.5 SUSPENSION ORAL at 09:05

## 2023-05-30 RX ADMIN — SULFAMETHOXAZOLE AND TRIMETHOPRIM 2 TABLET: 800; 160 TABLET ORAL at 09:05

## 2023-05-30 RX ADMIN — HEPARIN SODIUM 5000 UNITS: 5000 INJECTION INTRAVENOUS; SUBCUTANEOUS at 02:05

## 2023-05-30 RX ADMIN — LACOSAMIDE 200 MG: 100 TABLET, FILM COATED ORAL at 06:05

## 2023-05-30 RX ADMIN — HEPARIN SODIUM 5000 UNITS: 5000 INJECTION INTRAVENOUS; SUBCUTANEOUS at 06:05

## 2023-05-30 RX ADMIN — AMLODIPINE BESYLATE 10 MG: 10 TABLET ORAL at 09:05

## 2023-05-30 RX ADMIN — HEPARIN SODIUM 5000 UNITS: 5000 INJECTION INTRAVENOUS; SUBCUTANEOUS at 09:05

## 2023-05-30 NOTE — CONSULTS
[facility] - Neurosurgery ([facility])  Physical Medicine & Rehab  Consult Note    Patient Name: Yeison Andrade  MRN: [id number]  Admission Date: 5/24/2023  Hospital Length of Stay: 6 days  Attending Physician: Curtis Joseph MD  Consults  Subjective:     Principal Problem: Status epilepticus    HPI: Per chart review,  Mr. Andrade is a 37 y/o gentleman w/ PMHx TBI s/p crani 12/2020 c/b seizure d/o, bipolar d/o, HTN presenting w/ a breakthrough seizure, noted to be in focal status epilepticus at OSH. Hospital course c/b intubation for airway protection s/p extubation on 5/28, rhabdomyolysis w/ ALONDRA, transaminitis.Patient w/o acute events overnight, no further clinical seizures, off cEEG. On exam patient awakens to verbal stimuli, very pleasant, alert and oriented x4.  CK down trending. Pt with an uptrending transaminitis. concern that may be mild DILI 2/2 AEDs, notably given hx of TBI w/ bipolar disorder/behavioral disturbances and improvement in EEG s/p initiation of Clobazam.  PM &R was consulted to evaluate pt for IPR placement.       Functional History: Patient lives  with two cousins.  Prior to admission, Pt is I with ADLs and mobility.  DME: None.        Hospital Course: Per chart review,    OT- 05/30    Bed Mobility:    Patient completed Rolling/Turning to Left with  supervision  Patient completed Rolling/Turning to Right with supervision  Patient completed Supine to Sit with supervision  Patient completed Sit to Supine with supervision      Functional Mobility/Transfers:  Patient completed Sit <> Stand Transfer with supervision  with  no assistive device   Patient completed Bed <> Chair Transfer using Stand Pivot technique with supervision with no assistive device     Activities of Daily Living:  Grooming: minimum assistance while standing  Upper Body Dressing: minimum assistance with assistance for fasteners; inattention on the left when donning left sleeve.  Lower Body Dressing: minimum assistance with assistance  for fasteners.    PT- 05/29    Functional Mobility:  Bed Mobility:  Supine to Sit: stand by assistance  Transfers:  Sit to Stand: contact guard assistance with no AD  Bed to Chair: minimum assistance with hand-held assist using Step Transfer  Gait:   Patient ambulated 2x 20 with hand-held assist and moderate assistance        Past Medical History:   Diagnosis Date    Hypertension     Seizures      No past surgical history on file.  Review of patient's allergies indicates:  No Known Allergies    Scheduled Medications:    amLODIPine  10 mg Oral Daily    cefpodoxime  200 mg Oral Q12H    cloBAZam  20 mg Oral BID    heparin (porcine)  5,000 Units Subcutaneous Q8H    lacosamide  200 mg Oral Q12H    levETIRAcetam  1,000 mg Oral BID    polyethylene glycol  17 g Oral Daily       PRN Medications: acetaminophen, melatonin, ondansetron, senna, sodium chloride 0.9%    Family History    None       Tobacco Use    Smoking status: Not on file    Smokeless tobacco: Not on file   Substance and Sexual Activity    Alcohol use: Not on file    Drug use: Not on file    Sexual activity: Not on file     Review of Systems   Constitutional:  Positive for activity change.   Cardiovascular:  Negative for chest pain and leg swelling.   Musculoskeletal:  Positive for gait problem.   Neurological:  Positive for weakness.   Objective:     Vital Signs (Most Recent):  Temp: 97.2 °F (36.2 °C) (05/30/23 1107)  Pulse: 76 (Simultaneous filing. User may not have seen previous data.) (05/30/23 1107)  Resp: 18 (05/30/23 1107)  BP: (!) 124/90 (05/30/23 1107)  SpO2: 100 % (05/30/23 1107)    Vital Signs (24h Range):  Temp:  [97.2 °F (36.2 °C)-98.8 °F (37.1 °C)] 97.2 °F (36.2 °C)  Pulse:  [67-96] 76  Resp:  [16-20] 18  SpO2:  [96 %-100 %] 100 %  BP: (124-173)/(80-90) 124/90     Body mass index is 20.48 kg/m².     Physical Exam  Vitals and nursing note reviewed.   Constitutional:       General: He is awake.      Appearance: Normal appearance.   HENT:      Head:  Atraumatic.   Eyes:      Extraocular Movements: Extraocular movements intact.   Pulmonary:      Effort: Pulmonary effort is normal.   Abdominal:      Palpations: Abdomen is soft.   Musculoskeletal:      Cervical back: Normal range of motion and neck supple.   Skin:     General: Skin is warm and dry.   Neurological:      General: No focal deficit present.      Mental Status: He is alert and oriented to person, place, and time.      GCS: GCS eye subscore is 4. GCS verbal subscore is 5. GCS motor subscore is 6.      Motor: Weakness present.   Psychiatric:         Attention and Perception: Attention and perception normal.         Mood and Affect: Mood is elated.         Behavior: Behavior normal. Behavior is cooperative.        NEUROLOGICAL EXAMINATION:     MENTAL STATUS   Oriented to person, place, and time.     Diagnostic Results: Labs: Reviewed    Assessment/Plan:     * Status epilepticus  -EEG neg for seizures.  -CEEG discontinued 05/28.  -Continue AED's.    Lower resp. tract infection  -Continue Bactrim DS till 06/04.    Transaminitis  -Monitor LFTs.    Non-traumatic rhabdomyolysis  -S/P IVF.  -CK improving.    ALONDRA (acute kidney injury)  -Monitor CMP daily.     PM&R Recommendation:     At this time, the PM&R team has reviewed this patient's ongoing medical case including inpatient diagnosis, medical history, clinical examination, labs, vitals, current social and functional history to provide the post-acute recommendation as follows:     RECOMMENDATIONS:  inpatient rehabilitation due to good motivation/participation with therapies, has been determined to tolerate 3 hours of therapy and good potential for recovery, once medically stable.      The patient will be admitted for comprehensive interdisciplinary inpatient rehabilitation to address the impairments due to his medical diagnosis of statu epilepticus. The patient will benefit from an inpatient rehabilitation program to promote functional recovery, implement  compensatory strategies and will undergo assessment for needs for durable medical equipment for safe discharge to the community. This patient will benefit from a coordinated interdisciplinary rehabilitation program services that require close monitoring and treatment with 24-hour rehabilitative nursing and  physical/occupational therapies for 3 hours/day for 5 days/week. This interdisciplinary program will be performed under the direction of a physiatrist.        We will  sign off with the transfer of the patient to [facility] liaison who will continue to follow going forward until admission to [facility].         Thank you for your consult.     Camila Keller NP  Department of Physical Medicine & Rehab  [facility])

## 2023-05-30 NOTE — PLAN OF CARE
[facility]    FACILITY TRANSFER ORDERS      Patient Name: Yeison Andrade  YOB: 1985    PCP: Primary Doctor No   PCP Address: None  PCP Phone Number: None  PCP Fax: None    Encounter Date: 05/30/2023    Admit to: Rehab    Vital Signs:  Routine    Diagnoses:   Active Hospital Problems    Diagnosis  POA    *Status epilepticus [G40.901]  Yes     Priority: 1 - High    Lower resp. tract infection [J22]  No     Priority: 2     Non-traumatic rhabdomyolysis [M62.82]  Yes     Priority: 3     Transaminitis [R74.01]  Yes     Priority: 4     ALONDRA (acute kidney injury) [N17.9]  Yes     Priority: 5     Seizure [R56.9]  Yes    HTN (hypertension) [I10]  Yes      Resolved Hospital Problems    Diagnosis Date Resolved POA    On mechanically assisted ventilation [Z99.11] 05/29/2023 Not Applicable       Allergies:Review of patient's allergies indicates:  No Known Allergies    Diet: Vegetarian Lacto Ovo    Activities: Activity as tolerated    Goals of Care Treatment Preferences:  Code Status: Full Code      Nursing: Per facility     Labs:  None   needed      CONSULTS:    Physical Therapy to evaluate and treat. , Occupational Therapy to evaluate and treat., and  to evaluate for community resources/long-range planning.    MISCELLANEOUS CARE:  None    WOUND CARE ORDERS  None    Medications: Review discharge medications with patient and family and provide education.      Current Discharge Medication List        START taking these medications    Details   cloBAZam (ONFI) 20 mg Tab Take 1 tablet (20 mg total) by mouth 2 (two) times daily.  Qty: 60 tablet, Refills: 5      levETIRAcetam (KEPPRA) 1000 MG tablet Take 1 tablet (1,000 mg total) by mouth 2 (two) times daily.  Qty: 60 tablet, Refills: 11      polyethylene glycol (GLYCOLAX) 17 gram PwPk Take 17 g by mouth 2 (two) times daily as needed (Constipation).  Refills: 0           CONTINUE these medications which have NOT CHANGED    Details   amLODIPine  (NORVASC) 10 MG tablet Take 10 mg by mouth once daily.      lacosamide (VIMPAT) 50 mg Tab Take 100 mg by mouth 2 (two) times a day.           STOP taking these medications       lisinopriL (PRINIVIL,ZESTRIL) 5 MG tablet Comments:   Reason for Stopping:                  Immunizations Administered as of 5/30/2023       No immunizations on file.            This patient has had both covid vaccinations    Some patients may experience side effects after vaccination.  These may include fever, headache, muscle or joint aches.  Most symptoms resolve with 24-48 hours and do not require urgent medical evaluation unless they persist for more than 72 hours or symptoms are concerning for an unrelated medical condition.          _________________________________  Curtis Joseph MD  05/31/2023

## 2023-05-30 NOTE — CONSULTS
Inpatient consult to Physical Medicine Rehab  Consult performed by: Camila Keller NP  Consult ordered by: Curtis Joseph MD    Consult received.  Reviewed patient history and current admission.  PM&R following.    Camila Keller NP  Physical Medicine & Rehabilitation   05/30/2023

## 2023-05-30 NOTE — ASSESSMENT & PLAN NOTE
- Admitted to [facility] to r/o status  - LP results unremarkable  - Blood and urine culture without growth   - Off anesthetics and extubated 5/28  - EEG negative, discontinued 5/28  - Stepped down to  on 5/29  - Continue Vimpat 200 mg BID  - Continue Onfi 20 mg daily  - PT/OT: Rehab

## 2023-05-30 NOTE — HPI
Per chart review,  Mr. Andrade is a 37 y/o gentleman w/ PMHx TBI s/p crani 12/2020 c/b seizure d/o, bipolar d/o, HTN presenting w/ a breakthrough seizure, noted to be in focal status epilepticus at OSH. Hospital course c/b intubation for airway protection s/p extubation on 5/28, rhabdomyolysis w/ ALONDRA, transaminitis.Patient w/o acute events overnight, no further clinical seizures, off cEEG. On exam patient awakens to verbal stimuli, very pleasant, alert and oriented x4, motor and sensory exam intact throughout. Slightly impulsive on exam, consistent w/ known hx of TBI. Labs notable for resolution of prior ALONDRA, creatinine 1.0 this AM, CK downtrending from max of 27,913 on 5/23 -> 2078 on 5/28, UOP wnl. Will d/c IVFs, encourage PO intake. Does have an uptrending transaminitis, AST//85 this AM -> concern that may be mild DILI 2/2 AEDs, notably given hx of TBI w/ bipolar disorder/behavioral disturbances and improvement in EEG s/p initiation of onfi, will decrease keppra to 1 g BID, may consider further downtitration if LFTs continuing to uptrend and pt remains seizure free. PM &R was consulted to evaluate pt for IPR placement.       Functional History: Patient lives  with two cousins.  Prior to admission, Pt is I with ADLs and mobility.  DME: None.

## 2023-05-30 NOTE — SUBJECTIVE & OBJECTIVE
Past Medical History:   Diagnosis Date    Hypertension     Seizures      No past surgical history on file.  Review of patient's allergies indicates:  No Known Allergies    Scheduled Medications:    amLODIPine  10 mg Oral Daily    cefpodoxime  200 mg Oral Q12H    cloBAZam  20 mg Oral BID    heparin (porcine)  5,000 Units Subcutaneous Q8H    lacosamide  200 mg Oral Q12H    levETIRAcetam  1,000 mg Oral BID    polyethylene glycol  17 g Oral Daily       PRN Medications: acetaminophen, melatonin, ondansetron, senna, sodium chloride 0.9%    Family History    None       Tobacco Use    Smoking status: Not on file    Smokeless tobacco: Not on file   Substance and Sexual Activity    Alcohol use: Not on file    Drug use: Not on file    Sexual activity: Not on file     Review of Systems   Constitutional:  Positive for activity change.   Cardiovascular:  Negative for chest pain and leg swelling.   Musculoskeletal:  Positive for gait problem.   Neurological:  Positive for weakness.   Objective:     Vital Signs (Most Recent):  Temp: 97.2 °F (36.2 °C) (05/30/23 1107)  Pulse: 76 (Simultaneous filing. User may not have seen previous data.) (05/30/23 1107)  Resp: 18 (05/30/23 1107)  BP: (!) 124/90 (05/30/23 1107)  SpO2: 100 % (05/30/23 1107)    Vital Signs (24h Range):  Temp:  [97.2 °F (36.2 °C)-98.8 °F (37.1 °C)] 97.2 °F (36.2 °C)  Pulse:  [67-96] 76  Resp:  [16-20] 18  SpO2:  [96 %-100 %] 100 %  BP: (124-173)/(80-90) 124/90     Body mass index is 20.48 kg/m².     Physical Exam  Vitals and nursing note reviewed.   Constitutional:       General: He is awake.      Appearance: Normal appearance.   HENT:      Head: Atraumatic.   Eyes:      Extraocular Movements: Extraocular movements intact.   Pulmonary:      Effort: Pulmonary effort is normal.   Abdominal:      Palpations: Abdomen is soft.   Musculoskeletal:      Cervical back: Normal range of motion and neck supple.   Skin:     General: Skin is warm and dry.   Neurological:       General: No focal deficit present.      Mental Status: He is alert and oriented to person, place, and time.      GCS: GCS eye subscore is 4. GCS verbal subscore is 5. GCS motor subscore is 6.      Motor: Weakness present.   Psychiatric:         Attention and Perception: Attention and perception normal.         Mood and Affect: Mood is elated.         Behavior: Behavior normal. Behavior is cooperative.        NEUROLOGICAL EXAMINATION:     MENTAL STATUS   Oriented to person, place, and time.     Diagnostic Results: Labs: Reviewed

## 2023-05-30 NOTE — HOSPITAL COURSE
Per chart review,    OT- 05/30    Bed Mobility:    Patient completed Rolling/Turning to Left with  supervision  Patient completed Rolling/Turning to Right with supervision  Patient completed Supine to Sit with supervision  Patient completed Sit to Supine with supervision      Functional Mobility/Transfers:  Patient completed Sit <> Stand Transfer with supervision  with  no assistive device   Patient completed Bed <> Chair Transfer using Stand Pivot technique with supervision with no assistive device     Activities of Daily Living:  Grooming: minimum assistance while standing  Upper Body Dressing: minimum assistance with assistance for fasteners; inattention on the left when donning left sleeve.  Lower Body Dressing: minimum assistance with assistance for fasteners.    PT- 05/29    Functional Mobility:  Bed Mobility:  Supine to Sit: stand by assistance  Transfers:  Sit to Stand: contact guard assistance with no AD  Bed to Chair: minimum assistance with hand-held assist using Step Transfer  Gait:   Patient ambulated 2x 20 with hand-held assist and moderate assistance

## 2023-05-30 NOTE — ASSESSMENT & PLAN NOTE
Patient with acute kidney injury likely due to IVVD/dehydration ALONDRA is currently improving. Labs reviewed- Renal function/electrolytes with Estimated Creatinine Clearance: 80.9 mL/min (based on SCr of 1.2 mg/dL). according to latest data. Monitor urine output and serial BMP and adjust therapy as needed. Avoid nephrotoxins and renally dose meds for GFR listed above.

## 2023-05-30 NOTE — SUBJECTIVE & OBJECTIVE
Interval History:   Stepped down from ICU. No complaints this morning. No seizure activity. Pending placement to rehab.       Review of Systems   Constitutional:  Negative for activity change, appetite change, chills, diaphoresis, fatigue and fever.   HENT:  Negative for rhinorrhea and sore throat.    Respiratory:  Negative for cough, chest tightness and shortness of breath.    Cardiovascular:  Negative for chest pain and palpitations.   Gastrointestinal:  Negative for abdominal pain, constipation, diarrhea and nausea.   Endocrine: Negative for cold intolerance.   Genitourinary:  Negative for decreased urine volume and dysuria.   Musculoskeletal:  Negative for arthralgias and myalgias.   Skin:  Negative for rash and wound.   Neurological:  Negative for dizziness, weakness, numbness and headaches.   Psychiatric/Behavioral:  Negative for agitation, behavioral problems and confusion.    Objective:     Vital Signs (Most Recent):  Temp: 97.2 °F (36.2 °C) (05/30/23 1107)  Pulse: 76 (Simultaneous filing. User may not have seen previous data.) (05/30/23 1107)  Resp: 18 (05/30/23 1107)  BP: (!) 124/90 (05/30/23 1107)  SpO2: 100 % (05/30/23 1107) Vital Signs (24h Range):  Temp:  [97.2 °F (36.2 °C)-98.8 °F (37.1 °C)] 97.2 °F (36.2 °C)  Pulse:  [67-96] 76  Resp:  [16-20] 18  SpO2:  [96 %-100 %] 100 %  BP: (124-173)/(80-90) 124/90     Weight: 68.5 kg (151 lb)  Body mass index is 20.48 kg/m².    Intake/Output Summary (Last 24 hours) at 5/30/2023 1122  Last data filed at 5/30/2023 0600  Gross per 24 hour   Intake --   Output 900 ml   Net -900 ml         Physical Exam  Constitutional:       Appearance: Normal appearance. He is normal weight.   HENT:      Head: Normocephalic and atraumatic.      Mouth/Throat:      Mouth: Mucous membranes are moist.   Eyes:      Extraocular Movements: Extraocular movements intact.      Conjunctiva/sclera: Conjunctivae normal.   Cardiovascular:      Rate and Rhythm: Normal rate and regular rhythm.       Heart sounds: No murmur heard.  Pulmonary:      Effort: Pulmonary effort is normal. No respiratory distress.      Breath sounds: Normal breath sounds. No wheezing or rales.   Abdominal:      General: Abdomen is flat. There is no distension.      Palpations: Abdomen is soft.      Tenderness: There is no abdominal tenderness. There is no guarding.   Musculoskeletal:         General: No swelling or tenderness.   Skin:     Findings: No rash.   Neurological:      General: No focal deficit present.      Mental Status: He is alert and oriented to person, place, and time. Mental status is at baseline.   Psychiatric:         Mood and Affect: Mood normal.         Behavior: Behavior normal.           Significant Labs: All pertinent labs within the past 24 hours have been reviewed.  CBC:   Recent Labs   Lab 05/29/23  0145 05/30/23  0513   WBC 10.50 9.03   HGB 11.0* 10.9*   HCT 34.1* 33.8*    296     CMP:   Recent Labs   Lab 05/29/23  0145 05/30/23  0513    142   K 4.2 4.0    109   CO2 20* 23   GLU 92 105   BUN 10 15   CREATININE 1.0 1.2   CALCIUM 9.6 10.0   PROT 6.3 6.8   ALBUMIN 3.2* 3.3*   BILITOT 0.5 0.4   ALKPHOS 82 82   * 97*   ALT 85* 132*   ANIONGAP 13 10       Significant Imaging: I have reviewed all pertinent imaging results/findings within the past 24 hours.

## 2023-05-30 NOTE — PT/OT/SLP PROGRESS
"Occupational Therapy   Treatment    Name: Yeison Andrade  MRN: [id number]  Admitting Diagnosis:  Status epilepticus       Recommendations:     Discharge Recommendations: rehabilitation facility  Discharge Equipment Recommendations:  none  Barriers to discharge:  None    Assessment:     Yeison Andrade is a 38 y.o. male with a medical diagnosis of Status epilepticus.  He presents with performance deficits affecting function are impaired self care skills, impaired functional mobility, impaired fine motor, decreased upper extremity function.     Rehab Prognosis:  Good; patient would benefit from acute skilled OT services to address these deficits and reach maximum level of function.       Plan:     Patient to be seen 3 x/week to address the above listed problems via neuromuscular re-education, self-care/home management, therapeutic activities, therapeutic exercises  Plan of Care Expires: 06/22/23  Plan of Care Reviewed with: patient    Subjective     Patient:  "I've never had trouble with that left hand before."  "I feel better."  Pain/Comfort:  Pain Rating 1: 0/10  Pain Rating Post-Intervention 1: 0/10    Objective:     Communicated with: Nurse prior to session.  Patient found supine with bed alarm, peripheral IV, blood pressure cuff, telemetry upon OT entry to room.    General Precautions: Standard, aspiration, fall, NPO    Orthopedic Precautions:N/A  Braces: N/A  Respiratory Status: Room air     Occupational Performance:     Bed Mobility:    Patient completed Rolling/Turning to Left with  supervision  Patient completed Rolling/Turning to Right with supervision  Patient completed Supine to Sit with supervision  Patient completed Sit to Supine with supervision     Functional Mobility/Transfers:  Patient completed Sit <> Stand Transfer with supervision  with  no assistive device   Patient completed Bed <> Chair Transfer using Stand Pivot technique with supervision with no assistive device    Activities of Daily " Living:  Grooming: minimum assistance while standing  Upper Body Dressing: minimum assistance with assistance for fasteners; inattention on the left when donning left sleeve.  Lower Body Dressing: minimum assistance with assistance for fasteners    [facility] 6 Click ADL: 18    Treatment & Education:  Daily orientation provided.  Addressed FMC, left hand throughout ADLs.  Patient alert and oriented x 3; able to follow 4/4 one step commands.  Patient attentive and interactive throughout the session.       Patient left supine with all lines intact, call button in reach, and bed alarm on    GOALS:   Multidisciplinary Problems       Occupational Therapy Goals          Problem: Occupational Therapy    Goal Priority Disciplines Outcome Interventions   Occupational Therapy Goal     OT, PT/OT Ongoing, Progressing    Description: Goals set 5/29 to be addressed for 14 days with expiration date, 6/12:  Patient will increase functional independence with ADLs by performing:    Patient will demonstrate rolling to the right with modified independence.  Not met   Patient will demonstrate rolling to the left with modified independence.   Not met  Patient will demonstrate supine -sit with modified independence.   Not met  Patient will demonstrate stand pivot transfers with modified independence.   Not met  Patient will demonstrate grooming while standing with modified independence.   Not met  Patient will demonstrate upper body dressing with modified independence while seated EOB.   Not met  Patient will demonstrate lower body dressing with modified independence while seated EOB.   Not met  Patient will demonstrate toileting with modified independence.   Not met  Patient will demonstrate bathing while seated EOB with modified independence.   Not met  Patient's family / caregiver will demonstrate independence and safety with assisting patient with self-care skills and functional mobility.     Not met                             Time  Tracking:     OT Date of Treatment: 05/30/23  OT Start Time: 0633  OT Stop Time: 0656  OT Total Time (min): 23 min    Billable Minutes:Self Care/Home Management 13  Neuromuscular Re-education 10    OT/JERZY: OT          5/30/2023

## 2023-05-30 NOTE — PROGRESS NOTES
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 Was naked on presentation and incontinent of urine. Pt could not be identifed on presentation, was brought in by friend who also did not provide any information. Was given 10 mg Versed IM via EMS. Was obtunded and minimally responsive to painful stimuli.Pt intubated and sedated 5/23/23 in preparation for transfer to Neuro ICU. Had breakthrough seizure overnight despite sedation on Propofol and Versed, requiring Ativan push. EEG cap restarted this morning, discussed with Epileptologist at [facility]. Spot EEG unable to be obtained as technician has limited availability at [facility]. Admitted to [facility] for continuous EEG and neuro monitoring.       Overview/Hospital Course:  05/27/2023: Failed SBT due to apnea. Fentanyl gtt discontinued, continue Precedex. EEG negative for seizures.   05/28/2023: Tolerated SBT, extubated to room air. Tolerating well. EEG discontinued. IVF decreased.  05/29/2023:  NAEON. Keppra decreased to 1000mg BID. ALONDRA resolved. Stable for step down to .      Interval History:   Stepped down from ICU. No complaints this morning. No seizure activity. Pending placement to rehab.       Review of Systems   Constitutional:  Negative for activity change, appetite change, chills, diaphoresis, fatigue and fever.   HENT:  Negative for rhinorrhea and sore throat.    Respiratory:  Negative for cough, chest tightness and shortness of breath.     Cardiovascular:  Negative for chest pain and palpitations.   Gastrointestinal:  Negative for abdominal pain, constipation, diarrhea and nausea.   Endocrine: Negative for cold intolerance.   Genitourinary:  Negative for decreased urine volume and dysuria.   Musculoskeletal:  Negative for arthralgias and myalgias.   Skin:  Negative for rash and wound.   Neurological:  Negative for dizziness, weakness, numbness and headaches.   Psychiatric/Behavioral:  Negative for agitation, behavioral problems and confusion.    Objective:     Vital Signs (Most Recent):  Temp: 97.2 °F (36.2 °C) (05/30/23 1107)  Pulse: 76 (Simultaneous filing. User may not have seen previous data.) (05/30/23 1107)  Resp: 18 (05/30/23 1107)  BP: (!) 124/90 (05/30/23 1107)  SpO2: 100 % (05/30/23 1107) Vital Signs (24h Range):  Temp:  [97.2 °F (36.2 °C)-98.8 °F (37.1 °C)] 97.2 °F (36.2 °C)  Pulse:  [67-96] 76  Resp:  [16-20] 18  SpO2:  [96 %-100 %] 100 %  BP: (124-173)/(80-90) 124/90     Weight: 68.5 kg (151 lb)  Body mass index is 20.48 kg/m².    Intake/Output Summary (Last 24 hours) at 5/30/2023 1122  Last data filed at 5/30/2023 0600  Gross per 24 hour   Intake --   Output 900 ml   Net -900 ml         Physical Exam  Constitutional:       Appearance: Normal appearance. He is normal weight.   HENT:      Head: Normocephalic and atraumatic.      Mouth/Throat:      Mouth: Mucous membranes are moist.   Eyes:      Extraocular Movements: Extraocular movements intact.      Conjunctiva/sclera: Conjunctivae normal.   Cardiovascular:      Rate and Rhythm: Normal rate and regular rhythm.      Heart sounds: No murmur heard.  Pulmonary:      Effort: Pulmonary effort is normal. No respiratory distress.      Breath sounds: Normal breath sounds. No wheezing or rales.   Abdominal:      General: Abdomen is flat. There is no distension.      Palpations: Abdomen is soft.      Tenderness: There is no abdominal tenderness. There is no guarding.   Musculoskeletal:          General: No swelling or tenderness.   Skin:     Findings: No rash.   Neurological:      General: No focal deficit present.      Mental Status: He is alert and oriented to person, place, and time. Mental status is at baseline.   Psychiatric:         Mood and Affect: Mood normal.         Behavior: Behavior normal.           Significant Labs: All pertinent labs within the past 24 hours have been reviewed.  CBC:   Recent Labs   Lab 05/29/23  0145 05/30/23  0513   WBC 10.50 9.03   HGB 11.0* 10.9*   HCT 34.1* 33.8*    296     CMP:   Recent Labs   Lab 05/29/23  0145 05/30/23  0513    142   K 4.2 4.0    109   CO2 20* 23   GLU 92 105   BUN 10 15   CREATININE 1.0 1.2   CALCIUM 9.6 10.0   PROT 6.3 6.8   ALBUMIN 3.2* 3.3*   BILITOT 0.5 0.4   ALKPHOS 82 82   * 97*   ALT 85* 132*   ANIONGAP 13 10       Significant Imaging: I have reviewed all pertinent imaging results/findings within the past 24 hours.      Assessment/Plan:      * Status epilepticus  - Admitted to [facility] to r/o status  - LP results unremarkable  - Blood and urine culture without growth   - Off anesthetics and extubated 5/28  - EEG negative, discontinued 5/28  - Stepped down to HM on 5/29  - Continue Vimpat 200 mg BID  - Continue Onfi 20 mg daily  - PT/OT: Rehab    Lower resp. tract infection  - Resp with E cloacae on 5/26  - Continue Cefpodoxime to complete 7 days of treatment on 5/31    Non-traumatic rhabdomyolysis  - CK peaked at 29,865 on 5/23 but trending down  - CK down to 1385 on 5/30  - Encourage PO intake    Transaminitis  - Likely 2/2 rhabdomyolysis  - Trend LFTs  - Improving       ALONDRA (acute kidney injury)  Patient with acute kidney injury likely due to IVVD/dehydration ALONDRA is currently improving. Labs reviewed- Renal function/electrolytes with Estimated Creatinine Clearance: 80.9 mL/min (based on SCr of 1.2 mg/dL). according to latest data. Monitor urine output and serial BMP and adjust therapy as needed. Avoid nephrotoxins and  renally dose meds for GFR listed above.       HTN (hypertension)  Goal SBP <180      Seizure  Known h/o of seizure  See above        VTE Risk Mitigation (From admission, onward)         Ordered     heparin (porcine) injection 5,000 Units  Every 8 hours         05/24/23 1630     IP VTE LOW RISK PATIENT  Once         05/24/23 1523     Place sequential compression device  Until discontinued         05/24/23 1523                Discharge Planning   USHA: 5/31/2023     Code Status: Full Code   Is the patient medically ready for discharge?: Yes    Reason for patient still in hospital (select all that apply): Patient trending condition, Treatment, PT / OT recommendations and Pending disposition  Discharge Plan A: Rehab                  Curtis Joseph MD  Department of Hospital Medicine   [facility] - Neurosurgery ([facility])

## 2023-05-31 VITALS
DIASTOLIC BLOOD PRESSURE: 89 MMHG | BODY MASS INDEX: 20.45 KG/M2 | TEMPERATURE: 97 F | HEART RATE: 90 BPM | RESPIRATION RATE: 18 BRPM | HEIGHT: 72 IN | SYSTOLIC BLOOD PRESSURE: 138 MMHG | WEIGHT: 151 LBS | OXYGEN SATURATION: 100 %

## 2023-05-31 PROCEDURE — 63600175 PHARM REV CODE 636 W HCPCS: Performed by: NURSE PRACTITIONER

## 2023-05-31 PROCEDURE — 99239 HOSP IP/OBS DSCHRG MGMT >30: CPT | Mod: ,,, | Performed by: STUDENT IN AN ORGANIZED HEALTH CARE EDUCATION/TRAINING PROGRAM

## 2023-05-31 PROCEDURE — 25000003 PHARM REV CODE 250: Performed by: STUDENT IN AN ORGANIZED HEALTH CARE EDUCATION/TRAINING PROGRAM

## 2023-05-31 PROCEDURE — 97535 SELF CARE MNGMENT TRAINING: CPT

## 2023-05-31 PROCEDURE — 99239 PR HOSPITAL DISCHARGE DAY,>30 MIN: ICD-10-PCS | Mod: ,,, | Performed by: STUDENT IN AN ORGANIZED HEALTH CARE EDUCATION/TRAINING PROGRAM

## 2023-05-31 PROCEDURE — 25000003 PHARM REV CODE 250: Performed by: NURSE PRACTITIONER

## 2023-05-31 PROCEDURE — 97116 GAIT TRAINING THERAPY: CPT

## 2023-05-31 RX ORDER — CLOBAZAM 20 MG/1
20 TABLET ORAL 2 TIMES DAILY
Qty: 60 TABLET | Refills: 5
Start: 2023-05-31 | End: 2023-11-27

## 2023-05-31 RX ADMIN — CEFPODOXIME PROXETIL 200 MG: 200 TABLET, FILM COATED ORAL at 09:05

## 2023-05-31 RX ADMIN — LEVETIRACETAM 1000 MG: 500 TABLET, FILM COATED ORAL at 09:05

## 2023-05-31 RX ADMIN — LACOSAMIDE 200 MG: 100 TABLET, FILM COATED ORAL at 05:05

## 2023-05-31 RX ADMIN — AMLODIPINE BESYLATE 10 MG: 10 TABLET ORAL at 09:05

## 2023-05-31 RX ADMIN — CLOBAZAM 20 MG: 2.5 SUSPENSION ORAL at 09:05

## 2023-05-31 RX ADMIN — HEPARIN SODIUM 5000 UNITS: 5000 INJECTION INTRAVENOUS; SUBCUTANEOUS at 05:05

## 2023-05-31 NOTE — PLAN OF CARE
[facility] - Neurosurgery (Hospital)  Discharge Final Note    Primary Care Provider: Primary Doctor No  Expected Discharge Date: 5/31/2023    Patient medically ready for discharge to [facility].   Transportation by Ambulance for 2:30 pm  which is not a guaranteed arrival time.   Is family/patient aware of discharge: yes   Hospital follow up scheduled: yes       Final Discharge Note (most recent)       Final Note - 05/31/23 1429          Final Note    Assessment Type Final Discharge Note     Anticipated Discharge Disposition Rehab Facility     Hospital Resources/Appts/Education Provided Provided patient/caregiver with written discharge plan information                     Important Message from Medicare         Referral Info (most recent)       Referral Info    No documentation.                   No future appointments.  Cory Hernández RN  Case Management  Ext: [phone]  05/31/2023

## 2023-05-31 NOTE — PT/OT/SLP PROGRESS
"Occupational Therapy   Treatment    Name: Yeison Andrade  MRN: [id number]  Admitting Diagnosis:  Status epilepticus       Recommendations:     Discharge Recommendations: other (see comments)  Discharge Equipment Recommendations:  none  Barriers to discharge:  None    Assessment:     Yeison Andrade is a 38 y.o. male with a medical diagnosis of Status epilepticus.  He presents with performance deficits affecting function are impaired self care skills, impaired functional mobility.     Rehab Prognosis:  Good; patient would benefit from acute skilled OT services to address these deficits and reach maximum level of function.       Plan:     Patient to be seen 3 x/week to address the above listed problems via self-care/home management, therapeutic activities, neuromuscular re-education  Plan of Care Expires: 06/22/23  Plan of Care Reviewed with: patient    Subjective     Patient:  "I'm ready to go home.  I need to get my clothes from my grandfathers house because I don't want it to get stolen."   Pain/Comfort:  Pain Rating 1: 0/10  Pain Rating Post-Intervention 1: 0/10    Objective:     Communicated with: Nurse prior to session.  Patient found supine with peripheral IV upon OT entry to room.    General Precautions: Standard, aspiration, fall    Orthopedic Precautions:N/A  Braces: N/A  Respiratory Status: Room air     Occupational Performance:     Bed Mobility:    Patient completed Rolling/Turning to Left with  modified independence  Patient completed Rolling/Turning to Right with modified independence  Patient completed Supine to Sit with modified independence  Patient completed Sit to Supine with modified independence     Functional Mobility/Transfers:  Patient completed Sit <> Stand Transfer with modified independence  with  no assistive device   Patient completed Bed <> Chair Transfer using Stand Pivot technique with supervision with no assistive device  Patient completed Toilet Transfer Stand Pivot technique with " supervision with  no AD    Activities of Daily Living:  Grooming: supervision    Upper Body Dressing: supervision    Lower Body Dressing: supervision    Able to tie laces and button; significant improvement from eval     [facility] [address] [facility] 6 Click ADL: 18    Treatment & Education:  Patient alert and oriented x 3; able to follow 4/4 one step commands.  Patient attentive and interactive throughout the session.      Patient left supine with all lines intact, call button in reach, and bed alarm on    GOALS:   Multidisciplinary Problems       Occupational Therapy Goals          Problem: Occupational Therapy    Goal Priority Disciplines Outcome Interventions   Occupational Therapy Goal     OT, PT/OT Ongoing, Progressing    Description: Goals set 5/29 to be addressed for 14 days with expiration date, 6/12:  Patient will increase functional independence with ADLs by performing:    Patient will demonstrate rolling to the right with modified independence.  Not met   Patient will demonstrate rolling to the left with modified independence.   Not met  Patient will demonstrate supine -sit with modified independence.   Not met  Patient will demonstrate stand pivot transfers with modified independence.   Not met  Patient will demonstrate grooming while standing with modified independence.   Not met  Patient will demonstrate upper body dressing with modified independence while seated EOB.   Not met  Patient will demonstrate lower body dressing with modified independence while seated EOB.   Not met  Patient will demonstrate toileting with modified independence.   Not met  Patient will demonstrate bathing while seated EOB with modified independence.   Not met  Patient's family / caregiver will demonstrate independence and safety with assisting patient with self-care skills and functional mobility.     Not met                             Time Tracking:     OT Date of Treatment: 05/31/23  OT Start Time: 0903  OT Stop Time: 0926  OT Total Time (min):  23 min    Billable Minutes:Self Care/Home Management 23    OT/JERZY: OT          5/31/2023

## 2023-05-31 NOTE — PT/OT/SLP PROGRESS
"Physical Therapy Treatment    Patient Name:  Yeison Andrade   MRN:  [id number]    Recent Surgery: * No surgery found *      Recommendations:     Discharge Recommendations:  other   Discharge Equipment Recommendations: none   Barriers to discharge: None    Highest Level of Mobility: Gait 160'  Assistance Required: CGA w/ no AD    Assessment:     Yeison Andrade is a 38 y.o. male admitted with a medical diagnosis of Status epilepticus.    Pt met with HOB elevated and agreeable to PT treatment. Today's PT treatment focus was on gait training to improve activity tolerance and address gait deviations. Pt participates well in session but requires frequent cues for redirection to task. He currently requires CGA for gait training due to minor instability.     Pt is progressing towards acute PT goals appropriately and continues to benefit from acute PT sessions.     Rehab Prognosis: Good; patient would benefit from acute skilled PT services to address these deficits and reach maximum level of function.      Plan:     During this hospitalization, patient to be seen 4 x/week to address the identified rehab impairments via gait training, therapeutic activities, therapeutic exercises and progress toward the following goals:    Plan of Care Expires:  06/29/23    This plan of care has been discussed with the patient/caregiver, who was included in its development and is in agreement with the identified goals and treatment plan.     Subjective     Communicated with RN prior to session.  Patient agreeable to participate.     Pain/Comfort:  Pain Rating 1: 0/10  Pain Rating Post-Intervention 1: 0/10    Chief Complaint: Status epilepticus   Patient/Family Comments/goals: "I need to get my car and get my stuff. Can you help me?"      Objective:     Patient found HOB elevated with peripheral IV  upon PT entry to room.    General Precautions: Standard, aspiration, fall   Orthopedic Precautions:N/A   Braces: N/A "         Exams:    Cognition:  Patient is alert and cooperative   Follows one-step commands   Insight to deficits/safety awareness: impaired    Functional Mobility:    Bed Mobility:  Supine to Sit: Stand-by Assistance on L side of bed  Sit to Supine: Stand-by Assistance    Transfers:   Sit to Stand Transfer: Stand-by Assistance  from EOB with no AD              Gait:  Patient received gait training in hallway 160 feet with Contact Guard Assistance and  no AD  Gait Assessment: occasional unsteady gait, NBOS, decreased step length, flexed posture, and decreased ashley  Gait Pattern Observed: Step-through reciprocal gait  Comments: All lines remained intact throughout ambulation trial, gait belt utilized. PT provided verbal cues to widen DA to improve balance    Balance:  Static Sit:   Supervision at EOB   Normal: Patient able to maintain steady balance without handhold support.  Static Stand:   Contact-Guard Assist with no AD  Dynamic Stand:  Contact-Guard Assist with no AD      Therapeutic Activities/Exercises     Patient assisted with functional mobility as noted above  Patient educated on the importance of early mobility to prevent functional decline during hospital stay  Patient was instructed to utilize staff assistance for mobility/transfers.  Patient is appropriate to transfer with CGA and RN/PCT assist  Patient educated on PT POC and role of PT in acute care  White board updated regarding patient's safest level of mobility with staff assistance, RN also updated.     AM-PAC 6 CLICK MOBILITY  Turning over in bed (including adjusting bedclothes, sheets and blankets)?: 4  Sitting down on and standing up from a chair with arms (e.g., wheelchair, bedside commode, etc.): 4  Moving from lying on back to sitting on the side of the bed?: 4  Moving to and from a bed to a chair (including a wheelchair)?: 3  Need to walk in hospital room?: 3  Climbing 3-5 steps with a railing?: 3  Basic Mobility Total Score:  21     Patient left HOB elevated with all lines intact, call button in reach, bed alarm on, and rn notified.        History/Goals:     PAST MEDICAL HISTORY:  Past Medical History:   Diagnosis Date    Hypertension     Seizures        No past surgical history on file.    GOALS:   Multidisciplinary Problems       Physical Therapy Goals          Problem: Physical Therapy    Goal Priority Disciplines Outcome Goal Variances Interventions   Physical Therapy Goal     PT, PT/OT Ongoing, Progressing     Description: Goals to be completed by: 6/29/23    Pt will perform sup<>sit transfers w/ independence  Pt will have sufficient dynamic balance to sit EOB while performing ADLs/therex w/ independence  PT will be able to stand up from EOB w/ independence using no AD  Pt will ambulate 200 feet w/ independence using LRAD  Pt will be independent w/ HEP therex on BLE w/ good form and ROM                        Time Tracking:     PT Received On: 05/31/23  PT Start Time: 0824     PT Stop Time: 0848  PT Total Time (min): 24 min     Billable Minutes: Gait Training 24      Swati Chavira, PT  05/31/2023  Pager# [phone]

## 2023-05-31 NOTE — PLAN OF CARE
Problem: Adult Inpatient Plan of Care  Goal: Plan of Care Review  Outcome: Ongoing, Progressing     Problem: Adult Inpatient Plan of Care  Goal: Optimal Comfort and Wellbeing  Outcome: Ongoing, Progressing     Problem: Pain (Stroke, Hemorrhagic)  Goal: Acceptable Pain Control  Outcome: Ongoing, Progressing

## 2023-05-31 NOTE — NURSING
Patient stating per family patients phone was with patient via EMS. No patient belongings on floor with patient. Patient transferred from Neuro Crit with no belongings to bring. Called ED supervisor, searched safe contents for belongings and brought patient to view only unlabeled phone, with no success locating phone. Will attempt to contact [facility] ED to see if phone was left there. Patient off floor with transport, updated on above.

## 2023-06-01 NOTE — DISCHARGE SUMMARY
Jarett Preciado - Neurosurgery ([facility])  [facility] Medicine  Discharge Summary      Patient Name: Yeison Andrade  MRN: [id number]  BONITA: [id number]  Patient Class: IP- Inpatient  Admission Date: 5/24/2023  Hospital Length of Stay: 7 days  Discharge Date and Time: 5/31/2023  3:50 PM  Attending Physician: Maria C att. 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 Was naked on presentation and incontinent of urine. Pt could not be identifed on presentation, was brought in by friend who also did not provide any information. Was given 10 mg Versed IM via EMS. Was obtunded and minimally responsive to painful stimuli.Pt intubated and sedated 5/23/23 in preparation for transfer to Neuro ICU. Had breakthrough seizure overnight despite sedation on Propofol and Versed, requiring Ativan push. EEG cap restarted this morning, discussed with Epileptologist at [facility]. Spot EEG unable to be obtained as technician has limited availability at [facility]. Admitted to [facility] for continuous EEG and neuro monitoring.       * No surgery found *      Hospital Course:   05/27/2023: Failed SBT due to apnea. Fentanyl gtt discontinued, continue Precedex. EEG negative for seizures.   05/28/2023: Tolerated SBT, extubated to room air. Tolerating well. EEG discontinued. IVF decreased.  05/29/2023: NAEON. Keppra decreased to 1000mg BID. ALONDRA resolved.  IV fluids discontinued.  Stable for step down to .  05/30/2023: No complaints.  Pending placement to rehab  05/31/2023: Patient discharged to rehab with follow-up.       Goals of Care Treatment Preferences:  Code Status: Full  Code      Consults:   Consults (From admission, onward)        Status Ordering Provider     Inpatient consult to Physical Medicine Rehab  Once        Provider:  (Not yet assigned)    Completed ALEX YU     Inpatient consult to Physical Medicine Rehab  Once        Provider:  (Not yet assigned)    Completed JENNIFER NORMAN     Inpatient consult to Registered Dietitian/Nutritionist  Once        Provider:  (Not yet assigned)    Completed JENNIFER NORMAN          No new Assessment & Plan notes have been filed under this hospital service since the last note was generated.  Service: Hospital Medicine    Final Active Diagnoses:    Diagnosis Date Noted POA    PRINCIPAL PROBLEM:  Status epilepticus [G40.901] 05/24/2023 Yes    Lower resp. tract infection [J22] 05/30/2023 No    Non-traumatic rhabdomyolysis [M62.82] 05/24/2023 Yes    Transaminitis [R74.01] 05/23/2023 Yes    ALONDRA (acute kidney injury) [N17.9] 05/22/2023 Yes    Seizure [R56.9] 05/22/2023 Yes    HTN (hypertension) [I10] 05/22/2023 Yes      Problems Resolved During this Admission:    Diagnosis Date Noted Date Resolved POA    On mechanically assisted ventilation [Z99.11] 05/24/2023 05/29/2023 Not Applicable       Discharged Condition: good    Disposition: Rehab Facility    Follow Up:    Patient Instructions:      Diet Adult Regular     Notify your health care provider if you experience any of the following:  increased confusion or weakness     Notify your health care provider if you experience any of the following:  persistent dizziness, light-headedness, or visual disturbances     Notify your health care provider if you experience any of the following:  worsening rash     Notify your health care provider if you experience any of the following:  severe persistent headache     Notify your health care provider if you experience any of the following:  difficulty breathing or increased cough     Notify your health care provider if you experience any of the  following:  redness, tenderness, or signs of infection (pain, swelling, redness, odor or green/yellow discharge around incision site)     Notify your health care provider if you experience any of the following:  severe uncontrolled pain     Notify your health care provider if you experience any of the following:  persistent nausea and vomiting or diarrhea     Notify your health care provider if you experience any of the following:  temperature >100.4     Activity as tolerated       Significant Diagnostic Studies: Labs: All labs within the past 24 hours have been reviewed    Pending Diagnostic Studies:     None         Medications:  Reconciled Home Medications:      Medication List      START taking these medications    cloBAZam 20 mg Tab  Commonly known as: ONFI  Take 1 tablet (20 mg total) by mouth 2 (two) times daily.     levETIRAcetam 1000 MG tablet  Commonly known as: KEPPRA  Take 1 tablet (1,000 mg total) by mouth 2 (two) times daily.     polyethylene glycol 17 gram Pwpk  Commonly known as: GLYCOLAX  Take 17 g by mouth 2 (two) times daily as needed (Constipation).        CONTINUE taking these medications    amLODIPine 10 MG tablet  Commonly known as: NORVASC  Take 10 mg by mouth once daily.     lacosamide 50 mg Tab  Commonly known as: VIMPAT  Take 100 mg by mouth 2 (two) times a day.        STOP taking these medications    lisinopriL 5 MG tablet  Commonly known as: PRINIVIL,ZESTRIL            Indwelling Lines/Drains at time of discharge:   Lines/Drains/Airways     None                 Time spent on the discharge of patient: 35 minutes         Curtis Joseph MD  Department of Hospital Medicine  [facility] [address] Neurosurgery [facility])

## 2023-06-02 ENCOUNTER — NURSE TRIAGE (OUTPATIENT)
Dept: ADMINISTRATIVE | Facility: CLINIC | Age: 38
End: 2023-06-02
Payer: MEDICAID

## 2023-06-02 NOTE — TELEPHONE ENCOUNTER
Pt calling in stating he is on his way to [place] to  prescriptions- asking which new meds were prescribed. Pt was d/c from [facility] to rehab facility- states he was d/c from the rehab facility today. Advised NT is not able to see what pharmacy the rehab facility sent meds to but confirmed new medications with pt. States he is going to ask pharmacy about meds. Pt has no additional concerns or questions at this time. Advised to call back with concerns or worsening symptoms. Verbalized understanding.   Reason for Disposition   Caller has medicine question only, adult not sick, AND triager answers question    Additional Information   Negative: [1] Intentional drug overdose AND [2] suicidal thoughts or ideas   Negative: MORE THAN A DOUBLE DOSE of a prescription or over-the-counter (OTC) drug   Negative: [1] DOUBLE DOSE (an extra dose or lesser amount) of prescription drug AND [2] any symptoms (e.g., dizziness, nausea, pain, sleepiness)   Negative: [1] DOUBLE DOSE (an extra dose or lesser amount) of over-the-counter (OTC) drug AND [2] any symptoms (e.g., dizziness, nausea, pain, sleepiness)   Negative: Took another person's prescription drug   Negative: [1] DOUBLE DOSE (an extra dose or lesser amount) of prescription drug AND [2] NO symptoms  (Exception: A double dose of antibiotics.)   Negative: Diabetes drug error or overdose (e.g., took wrong type of insulin or took extra dose)   Negative: [1] Prescription not at pharmacy AND [2] was prescribed by PCP recently (Exception: Triager has access to EMR and prescription is recorded there. Go to Home Care and confirm for pharmacy.)   Negative: [1] Pharmacy calling with prescription question AND [2] triager unable to answer question   Negative: [1] Caller has URGENT medicine question about med that PCP or specialist prescribed AND [2] triager unable to answer question   Negative: Medicine patch causing local rash or itching   Negative: [1] Caller has medicine question  about med NOT prescribed by PCP AND [2] triager unable to answer question (e.g., compatibility with other med, storage)   Negative: Prescription request for new medicine (not a refill)   Negative: [1] Caller has NON-URGENT medicine question about med that PCP prescribed AND [2] triager unable to answer question   Negative: Caller wants to use a complementary or alternative medicine   Negative: [1] Prescription prescribed recently is not at pharmacy AND [2] triager has access to patient's EMR AND [3] prescription is recorded in the EMR   Negative: [1] DOUBLE DOSE (an extra dose or lesser amount) of over-the-counter (OTC) drug AND [2] NO symptoms   Negative: [1] DOUBLE DOSE (an extra dose or lesser amount) of antibiotic drug AND [2] NO symptoms    Protocols used: Medication Question Call-A-AH

## 2023-06-11 PROBLEM — Z97.8 ENDOTRACHEALLY INTUBATED: Status: ACTIVE | Noted: 2023-06-11

## 2023-09-04 PROBLEM — N17.9 AKI (ACUTE KIDNEY INJURY): Status: RESOLVED | Noted: 2023-05-22 | Resolved: 2023-09-04

## 2023-09-11 NOTE — PLAN OF CARE
Health Maintenance Due   Topic Date Due   • DTaP/Tdap/Td Vaccine (1 - Tdap) Never done   • Shingles Vaccine (1 of 2) Never done   • Cervical Cancer Screen 30-64 -  05/31/2023   • Influenza Vaccine (1) 09/01/2023   • Depression Screening  12/19/2023       Patient is due for topics as listed above but is not proceeding with Immunization(s) Dtap/Tdap/Td, Influenza and Shingles and Cervical cancer screening at this time.    [facility] Care Plan    POC reviewed with Yeison Raymond and family at 0300. Pt verbalized understanding. Questions and concerns addressed. No acute events overnight. Pt progressing toward goals. Will continue to monitor. See below and flowsheets for full assessment and VS info.     -plan for stepdown        Is this a stroke patient? no    Neuro:  [place] Coma Scale  Best Eye Response: 3-->(E3) to speech  Best Motor Response: 6-->(M6) obeys commands  Best Verbal Response: 5-->(V5) oriented  [place] Coma Scale Score: 14  Assessment Qualifiers: patient not sedated/intubated, no eye obstruction present  Pupil PERRLA: yes     24hr Temp:  [98 °F (36.7 °C)-98.5 °F (36.9 °C)]     CV:   Rhythm: normal sinus rhythm  BP goals:   SBP < 180  MAP > 65    Resp:      Vent Mode: Spont  Set Rate: 12 BPM  Oxygen Concentration (%): 24  Vt Set: 500 mL  PEEP/CPAP: 5 cmH20  Pressure Support: 5 cmH20    Plan: N/A    GI/:     Diet/Nutrition Received: regular  Last Bowel Movement: 05/27/23  Voiding Characteristics: voids spontaneously without difficulty, external catheter    Intake/Output Summary (Last 24 hours) at 5/29/2023 0313  Last data filed at 5/28/2023 2107  Gross per 24 hour   Intake 2467.17 ml   Output 2525 ml   Net -57.83 ml     Unmeasured Output  Urine Occurrence: 2  Stool Occurrence: 1  Pad Count: 2    Labs/Accuchecks:  Recent Labs   Lab 05/29/23  0145   WBC 10.50   RBC 3.85*   HGB 11.0*   HCT 34.1*         Recent Labs   Lab 05/29/23  0145      K 4.2   CO2 20*      BUN 10   CREATININE 1.0   ALKPHOS 82   ALT 85*   *   BILITOT 0.5    No results for input(s): PROTIME, INR, APTT, HEPANTIXA in the last 168 hours.   Recent Labs   Lab 05/22/23  1840 05/23/23  0414 05/28/23  0045   CPK 94736*   < > 2078*   TROPONINI 0.504*  --   --     < > = values in this interval not displayed.       Electrolytes: N/A - electrolytes WDL  Accuchecks: none    Gtts:   lactated ringers 75 mL/hr at 05/28/23 1742        LDA/Wounds:  Lines/Drains/Airways       Peripherally Inserted Central Catheter Line  Duration             PICC Triple Lumen 05/23/23 1605 right brachial 5 days              Peripheral Intravenous Line  Duration                  Peripheral IV - Single Lumen 05/23/23 1530 20 G Left Forearm 5 days                  Wounds: No  Wound care consulted: No

## 2024-07-19 NOTE — ASSESSMENT & PLAN NOTE
- Resp with E cloacae on 5/26  - Continue Cefpodoxime to complete 7 days of treatment on 5/31   show